# Patient Record
Sex: FEMALE | Race: WHITE | Employment: OTHER | URBAN - METROPOLITAN AREA
[De-identification: names, ages, dates, MRNs, and addresses within clinical notes are randomized per-mention and may not be internally consistent; named-entity substitution may affect disease eponyms.]

---

## 2019-12-15 ENCOUNTER — APPOINTMENT (OUTPATIENT)
Dept: GENERAL RADIOLOGY | Age: 79
End: 2019-12-15
Payer: MEDICARE

## 2019-12-15 ENCOUNTER — HOSPITAL ENCOUNTER (OUTPATIENT)
Age: 79
Setting detail: OBSERVATION
Discharge: HOME | End: 2019-12-16
Attending: EMERGENCY MEDICINE | Admitting: INTERNAL MEDICINE
Payer: MEDICARE

## 2019-12-15 DIAGNOSIS — R07.9 CHEST PAIN, UNSPECIFIED TYPE: Primary | ICD-10-CM

## 2019-12-15 LAB
ANION GAP SERPL CALCULATED.3IONS-SCNC: 11 MMOL/L (ref 9–17)
BNP INTERPRETATION: NORMAL
BUN BLDV-MCNC: 14 MG/DL (ref 8–23)
BUN/CREAT BLD: ABNORMAL (ref 9–20)
CALCIUM SERPL-MCNC: 9.9 MG/DL (ref 8.6–10.4)
CHLORIDE BLD-SCNC: 97 MMOL/L (ref 98–107)
CO2: 27 MMOL/L (ref 20–31)
CREAT SERPL-MCNC: 0.92 MG/DL (ref 0.5–0.9)
D-DIMER QUANTITATIVE: 0.58 MG/L FEU
GFR AFRICAN AMERICAN: >60 ML/MIN
GFR NON-AFRICAN AMERICAN: 59 ML/MIN
GFR SERPL CREATININE-BSD FRML MDRD: ABNORMAL ML/MIN/{1.73_M2}
GFR SERPL CREATININE-BSD FRML MDRD: ABNORMAL ML/MIN/{1.73_M2}
GLUCOSE BLD-MCNC: 98 MG/DL (ref 70–99)
HCT VFR BLD CALC: 43 % (ref 36–46)
HEMOGLOBIN: 14.4 G/DL (ref 12–16)
MCH RBC QN AUTO: 29.6 PG (ref 26–34)
MCHC RBC AUTO-ENTMCNC: 33.4 G/DL (ref 31–37)
MCV RBC AUTO: 88.6 FL (ref 80–100)
NRBC AUTOMATED: NORMAL PER 100 WBC
PDW BLD-RTO: 13.8 % (ref 12.5–15.4)
PLATELET # BLD: 198 K/UL (ref 140–450)
PMV BLD AUTO: 9.9 FL (ref 6–12)
POTASSIUM SERPL-SCNC: 3.8 MMOL/L (ref 3.7–5.3)
PRO-BNP: 299 PG/ML
RBC # BLD: 4.85 M/UL (ref 4–5.2)
SODIUM BLD-SCNC: 135 MMOL/L (ref 135–144)
TROPONIN INTERP: NORMAL
TROPONIN INTERP: NORMAL
TROPONIN T: NORMAL NG/ML
TROPONIN T: NORMAL NG/ML
TROPONIN, HIGH SENSITIVITY: <6 NG/L (ref 0–14)
TROPONIN, HIGH SENSITIVITY: <6 NG/L (ref 0–14)
WBC # BLD: 6.2 K/UL (ref 3.5–11)

## 2019-12-15 PROCEDURE — G0378 HOSPITAL OBSERVATION PER HR: HCPCS

## 2019-12-15 PROCEDURE — 83880 ASSAY OF NATRIURETIC PEPTIDE: CPT

## 2019-12-15 PROCEDURE — 85027 COMPLETE CBC AUTOMATED: CPT

## 2019-12-15 PROCEDURE — 36415 COLL VENOUS BLD VENIPUNCTURE: CPT

## 2019-12-15 PROCEDURE — 6370000000 HC RX 637 (ALT 250 FOR IP): Performed by: EMERGENCY MEDICINE

## 2019-12-15 PROCEDURE — 84484 ASSAY OF TROPONIN QUANT: CPT

## 2019-12-15 PROCEDURE — 6360000002 HC RX W HCPCS: Performed by: NURSE PRACTITIONER

## 2019-12-15 PROCEDURE — 85379 FIBRIN DEGRADATION QUANT: CPT

## 2019-12-15 PROCEDURE — 99285 EMERGENCY DEPT VISIT HI MDM: CPT

## 2019-12-15 PROCEDURE — 71046 X-RAY EXAM CHEST 2 VIEWS: CPT

## 2019-12-15 PROCEDURE — 93005 ELECTROCARDIOGRAM TRACING: CPT | Performed by: EMERGENCY MEDICINE

## 2019-12-15 PROCEDURE — 80048 BASIC METABOLIC PNL TOTAL CA: CPT

## 2019-12-15 PROCEDURE — 2580000003 HC RX 258: Performed by: NURSE PRACTITIONER

## 2019-12-15 RX ORDER — ASPIRIN 81 MG/1
324 TABLET, CHEWABLE ORAL ONCE
Status: COMPLETED | OUTPATIENT
Start: 2019-12-15 | End: 2019-12-15

## 2019-12-15 RX ORDER — LOVASTATIN 20 MG/1
30 TABLET ORAL DAILY
COMMUNITY
End: 2022-08-17

## 2019-12-15 RX ORDER — SODIUM CHLORIDE 0.9 % (FLUSH) 0.9 %
10 SYRINGE (ML) INJECTION PRN
Status: DISCONTINUED | OUTPATIENT
Start: 2019-12-15 | End: 2019-12-16 | Stop reason: HOSPADM

## 2019-12-15 RX ORDER — PANTOPRAZOLE SODIUM 40 MG/1
40 TABLET, DELAYED RELEASE ORAL DAILY
Status: ON HOLD | COMMUNITY
End: 2020-01-09

## 2019-12-15 RX ORDER — ACETAMINOPHEN 160 MG
TABLET,DISINTEGRATING ORAL
Status: ON HOLD | COMMUNITY
End: 2022-09-01 | Stop reason: HOSPADM

## 2019-12-15 RX ORDER — HYDRALAZINE HYDROCHLORIDE 20 MG/ML
10 INJECTION INTRAMUSCULAR; INTRAVENOUS EVERY 6 HOURS PRN
Status: DISCONTINUED | OUTPATIENT
Start: 2019-12-15 | End: 2019-12-16 | Stop reason: HOSPADM

## 2019-12-15 RX ORDER — NITROGLYCERIN 0.4 MG/1
0.4 TABLET SUBLINGUAL ONCE
Status: COMPLETED | OUTPATIENT
Start: 2019-12-15 | End: 2019-12-15

## 2019-12-15 RX ORDER — MAGNESIUM SULFATE 1 G/100ML
1 INJECTION INTRAVENOUS PRN
Status: DISCONTINUED | OUTPATIENT
Start: 2019-12-15 | End: 2019-12-16 | Stop reason: HOSPADM

## 2019-12-15 RX ORDER — ASPIRIN 81 MG/1
81 TABLET, CHEWABLE ORAL DAILY
COMMUNITY
End: 2022-08-17

## 2019-12-15 RX ORDER — ESTRADIOL 0.5 MG/1
0.25 TABLET ORAL DAILY
COMMUNITY
End: 2022-08-17

## 2019-12-15 RX ORDER — POTASSIUM CHLORIDE 7.45 MG/ML
10 INJECTION INTRAVENOUS PRN
Status: DISCONTINUED | OUTPATIENT
Start: 2019-12-15 | End: 2019-12-16 | Stop reason: HOSPADM

## 2019-12-15 RX ORDER — PANTOPRAZOLE SODIUM 40 MG/1
40 TABLET, DELAYED RELEASE ORAL DAILY
Status: DISCONTINUED | OUTPATIENT
Start: 2019-12-16 | End: 2019-12-16

## 2019-12-15 RX ORDER — ACEBUTOLOL HYDROCHLORIDE 400 MG/1
400 CAPSULE ORAL DAILY
COMMUNITY
End: 2022-08-17

## 2019-12-15 RX ORDER — NITROGLYCERIN 0.4 MG/1
0.4 TABLET SUBLINGUAL EVERY 5 MIN PRN
Status: DISCONTINUED | OUTPATIENT
Start: 2019-12-15 | End: 2019-12-16 | Stop reason: HOSPADM

## 2019-12-15 RX ORDER — ONDANSETRON 2 MG/ML
4 INJECTION INTRAMUSCULAR; INTRAVENOUS EVERY 6 HOURS PRN
Status: DISCONTINUED | OUTPATIENT
Start: 2019-12-15 | End: 2019-12-16 | Stop reason: HOSPADM

## 2019-12-15 RX ORDER — METOPROLOL TARTRATE 50 MG/1
100 TABLET, FILM COATED ORAL 2 TIMES DAILY
Status: DISCONTINUED | OUTPATIENT
Start: 2019-12-15 | End: 2019-12-16 | Stop reason: HOSPADM

## 2019-12-15 RX ORDER — ASPIRIN 81 MG/1
81 TABLET, CHEWABLE ORAL DAILY
Status: DISCONTINUED | OUTPATIENT
Start: 2019-12-16 | End: 2019-12-15

## 2019-12-15 RX ORDER — SIMVASTATIN 10 MG
10 TABLET ORAL NIGHTLY
Status: DISCONTINUED | OUTPATIENT
Start: 2019-12-15 | End: 2019-12-16 | Stop reason: HOSPADM

## 2019-12-15 RX ORDER — SODIUM CHLORIDE 0.9 % (FLUSH) 0.9 %
10 SYRINGE (ML) INJECTION EVERY 12 HOURS SCHEDULED
Status: DISCONTINUED | OUTPATIENT
Start: 2019-12-15 | End: 2019-12-16 | Stop reason: HOSPADM

## 2019-12-15 RX ORDER — ESTRADIOL 1 MG/1
0.25 TABLET ORAL DAILY
Status: DISCONTINUED | OUTPATIENT
Start: 2019-12-16 | End: 2019-12-16

## 2019-12-15 RX ORDER — POTASSIUM CHLORIDE 20 MEQ/1
40 TABLET, EXTENDED RELEASE ORAL PRN
Status: DISCONTINUED | OUTPATIENT
Start: 2019-12-15 | End: 2019-12-16 | Stop reason: HOSPADM

## 2019-12-15 RX ADMIN — HYDRALAZINE HYDROCHLORIDE 10 MG: 20 INJECTION INTRAMUSCULAR; INTRAVENOUS at 23:34

## 2019-12-15 RX ADMIN — SODIUM CHLORIDE, PRESERVATIVE FREE 10 ML: 5 INJECTION INTRAVENOUS at 23:37

## 2019-12-15 RX ADMIN — NITROGLYCERIN 0.4 MG: 0.4 TABLET, ORALLY DISINTEGRATING SUBLINGUAL at 21:48

## 2019-12-15 RX ADMIN — ASPIRIN 81 MG 324 MG: 81 TABLET ORAL at 21:17

## 2019-12-15 ASSESSMENT — PAIN SCALES - GENERAL
PAINLEVEL_OUTOF10: 2
PAINLEVEL_OUTOF10: 3
PAINLEVEL_OUTOF10: 2
PAINLEVEL_OUTOF10: 6

## 2019-12-15 ASSESSMENT — PAIN DESCRIPTION - ORIENTATION
ORIENTATION: MID
ORIENTATION: MID

## 2019-12-15 ASSESSMENT — HEART SCORE: ECG: 1

## 2019-12-15 ASSESSMENT — PAIN DESCRIPTION - PAIN TYPE
TYPE: ACUTE PAIN
TYPE: ACUTE PAIN

## 2019-12-15 ASSESSMENT — PAIN DESCRIPTION - DESCRIPTORS: DESCRIPTORS: SORE

## 2019-12-15 ASSESSMENT — PAIN DESCRIPTION - LOCATION
LOCATION: CHEST
LOCATION: CHEST

## 2019-12-16 ENCOUNTER — APPOINTMENT (OUTPATIENT)
Dept: NUCLEAR MEDICINE | Age: 79
End: 2019-12-16
Payer: MEDICARE

## 2019-12-16 VITALS
TEMPERATURE: 98.2 F | HEART RATE: 89 BPM | BODY MASS INDEX: 25.25 KG/M2 | SYSTOLIC BLOOD PRESSURE: 130 MMHG | DIASTOLIC BLOOD PRESSURE: 73 MMHG | OXYGEN SATURATION: 97 % | HEIGHT: 62 IN | WEIGHT: 137.2 LBS | RESPIRATION RATE: 16 BRPM

## 2019-12-16 PROBLEM — R41.3 MEMORY PROBLEM: Status: ACTIVE | Noted: 2019-12-16

## 2019-12-16 PROBLEM — I10 ESSENTIAL HYPERTENSION: Status: ACTIVE | Noted: 2019-12-16

## 2019-12-16 PROBLEM — Z86.79 HISTORY OF VENTRICULAR FIBRILLATION: Status: ACTIVE | Noted: 2019-12-16

## 2019-12-16 PROBLEM — F32.9 MAJOR DEPRESSION, SINGLE EPISODE: Status: ACTIVE | Noted: 2019-12-16

## 2019-12-16 PROBLEM — Z86.79 HISTORY OF VENTRICULAR FIBRILLATION: Status: RESOLVED | Noted: 2019-12-16 | Resolved: 2019-12-16

## 2019-12-16 PROBLEM — K44.9 HIATAL HERNIA: Status: ACTIVE | Noted: 2019-12-16

## 2019-12-16 PROBLEM — G47.00 INSOMNIA: Status: ACTIVE | Noted: 2019-12-16

## 2019-12-16 PROBLEM — M19.91 PRIMARY OSTEOARTHRITIS: Status: ACTIVE | Noted: 2019-12-16

## 2019-12-16 PROBLEM — M81.0 OSTEOPOROSIS: Status: ACTIVE | Noted: 2019-12-16

## 2019-12-16 PROBLEM — E78.5 HYPERLIPIDEMIA: Status: ACTIVE | Noted: 2019-12-16

## 2019-12-16 PROBLEM — L71.9 ROSACEA: Status: ACTIVE | Noted: 2019-12-16

## 2019-12-16 PROBLEM — M54.16 LUMBAR RADICULOPATHY: Status: ACTIVE | Noted: 2019-12-16

## 2019-12-16 PROBLEM — K57.32 DIVERTICULITIS OF COLON: Status: ACTIVE | Noted: 2019-12-16

## 2019-12-16 PROBLEM — I82.90 VENOUS THROMBOEMBOLISM (VTE): Status: ACTIVE | Noted: 2019-12-16

## 2019-12-16 PROBLEM — F41.9 ANXIETY: Status: ACTIVE | Noted: 2019-12-16

## 2019-12-16 LAB
ALBUMIN SERPL-MCNC: 4 G/DL (ref 3.5–5.2)
ALBUMIN/GLOBULIN RATIO: 1.4 (ref 1–2.5)
ALP BLD-CCNC: 70 U/L (ref 35–104)
ALT SERPL-CCNC: 15 U/L (ref 5–33)
ANION GAP SERPL CALCULATED.3IONS-SCNC: 12 MMOL/L (ref 9–17)
AST SERPL-CCNC: 20 U/L
BILIRUB SERPL-MCNC: 0.22 MG/DL (ref 0.3–1.2)
BUN BLDV-MCNC: 13 MG/DL (ref 8–23)
BUN/CREAT BLD: ABNORMAL (ref 9–20)
CALCIUM SERPL-MCNC: 9.4 MG/DL (ref 8.6–10.4)
CHLORIDE BLD-SCNC: 103 MMOL/L (ref 98–107)
CHOLESTEROL/HDL RATIO: 3.5
CHOLESTEROL: 234 MG/DL
CO2: 24 MMOL/L (ref 20–31)
CREAT SERPL-MCNC: 0.71 MG/DL (ref 0.5–0.9)
EKG ATRIAL RATE: 71 BPM
EKG P AXIS: 18 DEGREES
EKG P-R INTERVAL: 172 MS
EKG Q-T INTERVAL: 412 MS
EKG QRS DURATION: 98 MS
EKG QTC CALCULATION (BAZETT): 447 MS
EKG R AXIS: -33 DEGREES
EKG T AXIS: 59 DEGREES
EKG VENTRICULAR RATE: 71 BPM
GFR AFRICAN AMERICAN: >60 ML/MIN
GFR NON-AFRICAN AMERICAN: >60 ML/MIN
GFR SERPL CREATININE-BSD FRML MDRD: ABNORMAL ML/MIN/{1.73_M2}
GFR SERPL CREATININE-BSD FRML MDRD: ABNORMAL ML/MIN/{1.73_M2}
GLUCOSE BLD-MCNC: 99 MG/DL (ref 70–99)
HCT VFR BLD CALC: 42 % (ref 36–46)
HDLC SERPL-MCNC: 66 MG/DL
HEMOGLOBIN: 13.8 G/DL (ref 12–16)
LDL CHOLESTEROL: 141 MG/DL (ref 0–130)
LV EF: 67 %
LVEF MODALITY: NORMAL
MAGNESIUM: 1.9 MG/DL (ref 1.6–2.6)
MCH RBC QN AUTO: 29.2 PG (ref 26–34)
MCHC RBC AUTO-ENTMCNC: 32.8 G/DL (ref 31–37)
MCV RBC AUTO: 88.9 FL (ref 80–100)
NRBC AUTOMATED: NORMAL PER 100 WBC
PDW BLD-RTO: 13.5 % (ref 12.5–15.4)
PLATELET # BLD: 179 K/UL (ref 140–450)
PMV BLD AUTO: 9.9 FL (ref 6–12)
POTASSIUM SERPL-SCNC: 3.9 MMOL/L (ref 3.7–5.3)
RBC # BLD: 4.72 M/UL (ref 4–5.2)
SODIUM BLD-SCNC: 139 MMOL/L (ref 135–144)
TOTAL PROTEIN: 6.8 G/DL (ref 6.4–8.3)
TRIGL SERPL-MCNC: 137 MG/DL
TROPONIN INTERP: NORMAL
TROPONIN T: NORMAL NG/ML
TROPONIN, HIGH SENSITIVITY: 7 NG/L (ref 0–14)
VLDLC SERPL CALC-MCNC: ABNORMAL MG/DL (ref 1–30)
WBC # BLD: 5.4 K/UL (ref 3.5–11)

## 2019-12-16 PROCEDURE — 85027 COMPLETE CBC AUTOMATED: CPT

## 2019-12-16 PROCEDURE — 84484 ASSAY OF TROPONIN QUANT: CPT

## 2019-12-16 PROCEDURE — 3430000000 HC RX DIAGNOSTIC RADIOPHARMACEUTICAL: Performed by: CLINICAL NURSE SPECIALIST

## 2019-12-16 PROCEDURE — 80053 COMPREHEN METABOLIC PANEL: CPT

## 2019-12-16 PROCEDURE — 93017 CV STRESS TEST TRACING ONLY: CPT

## 2019-12-16 PROCEDURE — 6360000002 HC RX W HCPCS: Performed by: NURSE PRACTITIONER

## 2019-12-16 PROCEDURE — 2580000003 HC RX 258: Performed by: NURSE PRACTITIONER

## 2019-12-16 PROCEDURE — 2580000003 HC RX 258: Performed by: CLINICAL NURSE SPECIALIST

## 2019-12-16 PROCEDURE — 6370000000 HC RX 637 (ALT 250 FOR IP): Performed by: NURSE PRACTITIONER

## 2019-12-16 PROCEDURE — 96372 THER/PROPH/DIAG INJ SC/IM: CPT

## 2019-12-16 PROCEDURE — 80061 LIPID PANEL: CPT

## 2019-12-16 PROCEDURE — 36415 COLL VENOUS BLD VENIPUNCTURE: CPT

## 2019-12-16 PROCEDURE — G0378 HOSPITAL OBSERVATION PER HR: HCPCS

## 2019-12-16 PROCEDURE — 78452 HT MUSCLE IMAGE SPECT MULT: CPT

## 2019-12-16 PROCEDURE — A9500 TC99M SESTAMIBI: HCPCS | Performed by: CLINICAL NURSE SPECIALIST

## 2019-12-16 PROCEDURE — APPSS45 APP SPLIT SHARED TIME 31-45 MINUTES: Performed by: CLINICAL NURSE SPECIALIST

## 2019-12-16 PROCEDURE — 83735 ASSAY OF MAGNESIUM: CPT

## 2019-12-16 PROCEDURE — 99219 PR INITIAL OBSERVATION CARE/DAY 50 MINUTES: CPT | Performed by: INTERNAL MEDICINE

## 2019-12-16 PROCEDURE — 6360000002 HC RX W HCPCS: Performed by: CLINICAL NURSE SPECIALIST

## 2019-12-16 RX ORDER — PANTOPRAZOLE SODIUM 20 MG/1
20 TABLET, DELAYED RELEASE ORAL DAILY
Status: DISCONTINUED | OUTPATIENT
Start: 2019-12-16 | End: 2019-12-16

## 2019-12-16 RX ORDER — ESTRADIOL 1 MG/1
0.5 TABLET ORAL DAILY
Status: DISCONTINUED | OUTPATIENT
Start: 2019-12-16 | End: 2019-12-16 | Stop reason: HOSPADM

## 2019-12-16 RX ORDER — SODIUM CHLORIDE 0.9 % (FLUSH) 0.9 %
10 SYRINGE (ML) INJECTION PRN
Status: ACTIVE | OUTPATIENT
Start: 2019-12-16 | End: 2019-12-16

## 2019-12-16 RX ORDER — SODIUM CHLORIDE 0.9 % (FLUSH) 0.9 %
10 SYRINGE (ML) INJECTION PRN
Status: DISCONTINUED | OUTPATIENT
Start: 2019-12-16 | End: 2019-12-16 | Stop reason: HOSPADM

## 2019-12-16 RX ORDER — SODIUM CHLORIDE 9 MG/ML
500 INJECTION, SOLUTION INTRAVENOUS CONTINUOUS PRN
Status: ACTIVE | OUTPATIENT
Start: 2019-12-16 | End: 2019-12-16

## 2019-12-16 RX ORDER — NITROGLYCERIN 0.4 MG/1
0.4 TABLET SUBLINGUAL EVERY 5 MIN PRN
Status: ACTIVE | OUTPATIENT
Start: 2019-12-16 | End: 2019-12-16

## 2019-12-16 RX ORDER — ALBUTEROL SULFATE 90 UG/1
2 AEROSOL, METERED RESPIRATORY (INHALATION) PRN
Status: ACTIVE | OUTPATIENT
Start: 2019-12-16 | End: 2019-12-16

## 2019-12-16 RX ORDER — ATROPINE SULFATE 0.1 MG/ML
0.5 INJECTION INTRAVENOUS EVERY 5 MIN PRN
Status: ACTIVE | OUTPATIENT
Start: 2019-12-16 | End: 2019-12-16

## 2019-12-16 RX ORDER — OMEPRAZOLE 20 MG/1
20 CAPSULE, DELAYED RELEASE ORAL DAILY
Status: DISCONTINUED | OUTPATIENT
Start: 2019-12-16 | End: 2019-12-16 | Stop reason: HOSPADM

## 2019-12-16 RX ORDER — AMINOPHYLLINE DIHYDRATE 25 MG/ML
50 INJECTION, SOLUTION INTRAVENOUS PRN
Status: ACTIVE | OUTPATIENT
Start: 2019-12-16 | End: 2019-12-16

## 2019-12-16 RX ORDER — METOPROLOL TARTRATE 5 MG/5ML
5 INJECTION INTRAVENOUS EVERY 5 MIN PRN
Status: ACTIVE | OUTPATIENT
Start: 2019-12-16 | End: 2019-12-16

## 2019-12-16 RX ADMIN — METOPROLOL TARTRATE 100 MG: 50 TABLET, FILM COATED ORAL at 11:22

## 2019-12-16 RX ADMIN — ESTRADIOL 0.5 MG: 1 TABLET ORAL at 13:14

## 2019-12-16 RX ADMIN — SODIUM CHLORIDE, PRESERVATIVE FREE 10 ML: 5 INJECTION INTRAVENOUS at 11:22

## 2019-12-16 RX ADMIN — TETRAKIS(2-METHOXYISOBUTYLISOCYANIDE)COPPER(I) TETRAFLUOROBORATE 12.3 MILLICURIE: 1 INJECTION, POWDER, LYOPHILIZED, FOR SOLUTION INTRAVENOUS at 10:20

## 2019-12-16 RX ADMIN — Medication 10 ML: at 10:22

## 2019-12-16 RX ADMIN — OMEPRAZOLE 20 MG: 20 CAPSULE, DELAYED RELEASE ORAL at 13:14

## 2019-12-16 RX ADMIN — ENOXAPARIN SODIUM 40 MG: 40 INJECTION SUBCUTANEOUS at 11:24

## 2019-12-16 RX ADMIN — REGADENOSON 0.4 MG: 0.08 INJECTION, SOLUTION INTRAVENOUS at 10:25

## 2019-12-16 RX ADMIN — TETRAKIS(2-METHOXYISOBUTYLISOCYANIDE)COPPER(I) TETRAFLUOROBORATE 38.5 MILLICURIE: 1 INJECTION, POWDER, LYOPHILIZED, FOR SOLUTION INTRAVENOUS at 12:30

## 2019-12-16 RX ADMIN — Medication 10 ML: at 10:25

## 2019-12-16 RX ADMIN — ASPIRIN 325 MG: 325 TABLET, COATED ORAL at 11:24

## 2019-12-16 ASSESSMENT — ENCOUNTER SYMPTOMS
TROUBLE SWALLOWING: 0
COUGH: 0
CHEST TIGHTNESS: 0
SORE THROAT: 0
SHORTNESS OF BREATH: 0

## 2019-12-28 LAB
EKG ATRIAL RATE: 81 BPM
EKG P AXIS: 23 DEGREES
EKG P-R INTERVAL: 200 MS
EKG Q-T INTERVAL: 414 MS
EKG QRS DURATION: 102 MS
EKG QTC CALCULATION (BAZETT): 480 MS
EKG R AXIS: -34 DEGREES
EKG T AXIS: 80 DEGREES
EKG VENTRICULAR RATE: 81 BPM

## 2020-01-08 ENCOUNTER — APPOINTMENT (OUTPATIENT)
Dept: CT IMAGING | Age: 80
End: 2020-01-08
Payer: MEDICARE

## 2020-01-08 ENCOUNTER — HOSPITAL ENCOUNTER (OUTPATIENT)
Age: 80
Setting detail: OBSERVATION
Discharge: HOME | End: 2020-01-09
Attending: EMERGENCY MEDICINE | Admitting: INTERNAL MEDICINE
Payer: MEDICARE

## 2020-01-08 LAB
ABSOLUTE EOS #: 0.2 K/UL (ref 0–0.4)
ABSOLUTE IMMATURE GRANULOCYTE: NORMAL K/UL (ref 0–0.3)
ABSOLUTE LYMPH #: 1.6 K/UL (ref 1–4.8)
ABSOLUTE MONO #: 0.6 K/UL (ref 0.1–1.2)
ANION GAP SERPL CALCULATED.3IONS-SCNC: 15 MMOL/L (ref 9–17)
BASOPHILS # BLD: 1 % (ref 0–2)
BASOPHILS ABSOLUTE: 0.1 K/UL (ref 0–0.2)
BILIRUBIN URINE: NEGATIVE
BUN BLDV-MCNC: 11 MG/DL (ref 8–23)
BUN/CREAT BLD: ABNORMAL (ref 9–20)
CALCIUM SERPL-MCNC: 9.4 MG/DL (ref 8.6–10.4)
CHLORIDE BLD-SCNC: 94 MMOL/L (ref 98–107)
CO2: 24 MMOL/L (ref 20–31)
COLOR: YELLOW
COMMENT UA: NORMAL
CREAT SERPL-MCNC: 0.8 MG/DL (ref 0.5–0.9)
D-DIMER QUANTITATIVE: 0.62 MG/L FEU
DIFFERENTIAL TYPE: NORMAL
EOSINOPHILS RELATIVE PERCENT: 3 % (ref 1–4)
GFR AFRICAN AMERICAN: >60 ML/MIN
GFR NON-AFRICAN AMERICAN: >60 ML/MIN
GFR SERPL CREATININE-BSD FRML MDRD: ABNORMAL ML/MIN/{1.73_M2}
GFR SERPL CREATININE-BSD FRML MDRD: ABNORMAL ML/MIN/{1.73_M2}
GLUCOSE BLD-MCNC: 112 MG/DL (ref 70–99)
GLUCOSE URINE: NEGATIVE
HCT VFR BLD CALC: 42.8 % (ref 36–46)
HEMOGLOBIN: 14.3 G/DL (ref 12–16)
IMMATURE GRANULOCYTES: NORMAL %
KETONES, URINE: NEGATIVE
LEUKOCYTE ESTERASE, URINE: NEGATIVE
LYMPHOCYTES # BLD: 24 % (ref 24–44)
MCH RBC QN AUTO: 29.4 PG (ref 26–34)
MCHC RBC AUTO-ENTMCNC: 33.4 G/DL (ref 31–37)
MCV RBC AUTO: 88.1 FL (ref 80–100)
MONOCYTES # BLD: 9 % (ref 2–11)
NITRITE, URINE: NEGATIVE
NRBC AUTOMATED: NORMAL PER 100 WBC
PDW BLD-RTO: 13.8 % (ref 12.5–15.4)
PH UA: 7 (ref 5–8)
PLATELET # BLD: 202 K/UL (ref 140–450)
PLATELET ESTIMATE: NORMAL
PMV BLD AUTO: 9.4 FL (ref 6–12)
POTASSIUM SERPL-SCNC: 4.3 MMOL/L (ref 3.7–5.3)
PROTEIN UA: NEGATIVE
RBC # BLD: 4.86 M/UL (ref 4–5.2)
RBC # BLD: NORMAL 10*6/UL
SEG NEUTROPHILS: 63 % (ref 36–66)
SEGMENTED NEUTROPHILS ABSOLUTE COUNT: 4.4 K/UL (ref 1.8–7.7)
SODIUM BLD-SCNC: 133 MMOL/L (ref 135–144)
SPECIFIC GRAVITY UA: 1.01 (ref 1–1.03)
TROPONIN INTERP: NORMAL
TROPONIN INTERP: NORMAL
TROPONIN T: NORMAL NG/ML
TROPONIN T: NORMAL NG/ML
TROPONIN, HIGH SENSITIVITY: <6 NG/L (ref 0–14)
TROPONIN, HIGH SENSITIVITY: <6 NG/L (ref 0–14)
TURBIDITY: CLEAR
URINE HGB: NEGATIVE
UROBILINOGEN, URINE: NORMAL
WBC # BLD: 7 K/UL (ref 3.5–11)
WBC # BLD: NORMAL 10*3/UL

## 2020-01-08 PROCEDURE — G0378 HOSPITAL OBSERVATION PER HR: HCPCS

## 2020-01-08 PROCEDURE — 99285 EMERGENCY DEPT VISIT HI MDM: CPT

## 2020-01-08 PROCEDURE — 70450 CT HEAD/BRAIN W/O DYE: CPT

## 2020-01-08 PROCEDURE — 6360000004 HC RX CONTRAST MEDICATION: Performed by: EMERGENCY MEDICINE

## 2020-01-08 PROCEDURE — 93005 ELECTROCARDIOGRAM TRACING: CPT | Performed by: PHYSICIAN ASSISTANT

## 2020-01-08 PROCEDURE — 6370000000 HC RX 637 (ALT 250 FOR IP): Performed by: EMERGENCY MEDICINE

## 2020-01-08 PROCEDURE — 2580000003 HC RX 258: Performed by: PHYSICIAN ASSISTANT

## 2020-01-08 PROCEDURE — 85379 FIBRIN DEGRADATION QUANT: CPT

## 2020-01-08 PROCEDURE — 81003 URINALYSIS AUTO W/O SCOPE: CPT

## 2020-01-08 PROCEDURE — 84484 ASSAY OF TROPONIN QUANT: CPT

## 2020-01-08 PROCEDURE — 2580000003 HC RX 258: Performed by: EMERGENCY MEDICINE

## 2020-01-08 PROCEDURE — 85025 COMPLETE CBC W/AUTO DIFF WBC: CPT

## 2020-01-08 PROCEDURE — 80048 BASIC METABOLIC PNL TOTAL CA: CPT

## 2020-01-08 PROCEDURE — 6360000002 HC RX W HCPCS: Performed by: PHYSICIAN ASSISTANT

## 2020-01-08 PROCEDURE — 36415 COLL VENOUS BLD VENIPUNCTURE: CPT

## 2020-01-08 PROCEDURE — 96374 THER/PROPH/DIAG INJ IV PUSH: CPT

## 2020-01-08 PROCEDURE — 71260 CT THORAX DX C+: CPT

## 2020-01-08 RX ORDER — LORAZEPAM 2 MG/ML
0.5 INJECTION INTRAMUSCULAR ONCE
Status: COMPLETED | OUTPATIENT
Start: 2020-01-08 | End: 2020-01-08

## 2020-01-08 RX ORDER — SODIUM CHLORIDE 0.9 % (FLUSH) 0.9 %
10 SYRINGE (ML) INJECTION ONCE
Status: COMPLETED | OUTPATIENT
Start: 2020-01-08 | End: 2020-01-08

## 2020-01-08 RX ORDER — 0.9 % SODIUM CHLORIDE 0.9 %
500 INTRAVENOUS SOLUTION INTRAVENOUS ONCE
Status: COMPLETED | OUTPATIENT
Start: 2020-01-08 | End: 2020-01-08

## 2020-01-08 RX ORDER — MAGNESIUM HYDROXIDE/ALUMINUM HYDROXICE/SIMETHICONE 120; 1200; 1200 MG/30ML; MG/30ML; MG/30ML
30 SUSPENSION ORAL ONCE
Status: COMPLETED | OUTPATIENT
Start: 2020-01-08 | End: 2020-01-08

## 2020-01-08 RX ORDER — PANTOPRAZOLE SODIUM 40 MG/10ML
40 INJECTION, POWDER, LYOPHILIZED, FOR SOLUTION INTRAVENOUS ONCE
Status: COMPLETED | OUTPATIENT
Start: 2020-01-09 | End: 2020-01-09

## 2020-01-08 RX ORDER — 0.9 % SODIUM CHLORIDE 0.9 %
80 INTRAVENOUS SOLUTION INTRAVENOUS ONCE
Status: COMPLETED | OUTPATIENT
Start: 2020-01-08 | End: 2020-01-08

## 2020-01-08 RX ORDER — SODIUM CHLORIDE 9 MG/ML
INJECTION INTRAVENOUS
Status: COMPLETED
Start: 2020-01-08 | End: 2020-01-09

## 2020-01-08 RX ADMIN — LORAZEPAM 0.5 MG: 2 INJECTION INTRAMUSCULAR; INTRAVENOUS at 18:58

## 2020-01-08 RX ADMIN — IOVERSOL 75 ML: 741 INJECTION INTRA-ARTERIAL; INTRAVENOUS at 19:24

## 2020-01-08 RX ADMIN — Medication 10 ML: at 19:24

## 2020-01-08 RX ADMIN — SODIUM CHLORIDE 80 ML: 9 INJECTION, SOLUTION INTRAVENOUS at 19:25

## 2020-01-08 RX ADMIN — ALUMINUM HYDROXIDE, MAGNESIUM HYDROXIDE, AND SIMETHICONE 30 ML: 200; 200; 20 SUSPENSION ORAL at 22:35

## 2020-01-08 RX ADMIN — SODIUM CHLORIDE 1000 ML: 9 INJECTION, SOLUTION INTRAVENOUS at 18:48

## 2020-01-08 ASSESSMENT — PAIN DESCRIPTION - FREQUENCY: FREQUENCY: CONTINUOUS

## 2020-01-08 ASSESSMENT — PAIN DESCRIPTION - PAIN TYPE: TYPE: ACUTE PAIN

## 2020-01-08 ASSESSMENT — ENCOUNTER SYMPTOMS
EYE REDNESS: 0
TROUBLE SWALLOWING: 0
COUGH: 0
EYE DISCHARGE: 0
SORE THROAT: 0
ABDOMINAL PAIN: 0
VOMITING: 0
NAUSEA: 0
SHORTNESS OF BREATH: 0
BACK PAIN: 0

## 2020-01-08 ASSESSMENT — PAIN SCALES - GENERAL: PAINLEVEL_OUTOF10: 4

## 2020-01-08 ASSESSMENT — PAIN DESCRIPTION - LOCATION: LOCATION: CHEST

## 2020-01-08 ASSESSMENT — PAIN DESCRIPTION - DESCRIPTORS: DESCRIPTORS: SORE

## 2020-01-08 ASSESSMENT — PAIN DESCRIPTION - ORIENTATION: ORIENTATION: MID

## 2020-01-08 NOTE — ED PROVIDER NOTES
[address] [facility] Surg ICU  [address] [facility] [address]  Phone: [phone]      Pt Name: Melony Aguilar  MRN: [id number]  [place] 1940  Date of evaluation: 1/8/2020      CHIEF COMPLAINT       Chief Complaint   Patient presents with    Chest Pain     Sudden onset when climbing the stairs at 1800 today. HISTORY OF PRESENT ILLNESS   (Location, Quality, Severity, Duration, Timing, Context, ModifyingFactors, Associated Signs and Symptoms)     Melony Aguilar is a [de-identified] y.o. female who presents to the ER for evaluation of chest pain. Patient states that she developed pain in the chest after climbing the stairs from the basement around 6 PM this evening. Patient states that the pain radiates up into the right side of her neck. She is unable to describe the pain, but states it has been constant. Patient denies difficulty breathing. She has had no cough. She denies pain and swelling in the lower extremities. No recent surgeries or long travel. Patient states that when she noticed the pain, she took an additional half dose of her blood pressure medication. Patient has had similar chest pain in the past.  She was admitted to this facility in December for chest pain. She underwent a cardiac stress test which was found to be negative. She rates her acute chest pain at 4/10. Nursing Notes were reviewed. REVIEW OF SYSTEMS     (2-9 systems for level 4, 10 or more for level 5)    Review of Systems   Constitutional: Negative for chills and fever. HENT: Positive for congestion. Negative for sore throat and trouble swallowing. Eyes: Negative for discharge and redness. Respiratory: Negative for cough and shortness of breath. Cardiovascular: Positive for chest pain. Gastrointestinal: Negative for abdominal pain, nausea and vomiting. Genitourinary: Negative for decreased urine volume. Musculoskeletal: Negative for back pain, myalgias and neck pain.    Skin: Negative weight. She is not toxic-appearing. Comments: Anxious. HENT:      Head: Normocephalic and atraumatic. Mouth/Throat:      Mouth: Mucous membranes are moist.      Pharynx: Oropharynx is clear. Eyes:      General: No scleral icterus. Neck:      Musculoskeletal: Normal range of motion and neck supple. Cardiovascular:      Rate and Rhythm: Normal rate and regular rhythm. Heart sounds: Normal heart sounds. Pulmonary:      Effort: Pulmonary effort is normal. No respiratory distress. Breath sounds: Normal breath sounds. No wheezing. Chest:      Chest wall: No tenderness. Abdominal:      General: Bowel sounds are normal.      Palpations: Abdomen is soft. Tenderness: There is no tenderness. There is no guarding or rebound. Musculoskeletal:      Comments: No pain or swelling in the bilateral lower extremities. Skin:     General: Skin is warm and dry. Findings: No rash. Neurological:      General: No focal deficit present. Mental Status: She is alert. Psychiatric:         Mood and Affect: Mood normal.         Behavior: Behavior normal.       DIAGNOSTIC RESULTS   EKG  EKG obtained at 18:21 shows normal sinus rhythm at a rate of 62 bpm.  No ST elevations or depressions. No T wave inversions. Left axis deviation as interpreted by me. EKG was also interpreted by the attending physician, Dr. Samm Driver. RADIOLOGY:   Radiology images were visualized by myself. The Radiologist interpretations were reviewed and are as follows:     CT Chest Pulmonary Embolism W Contrast (Final result)   Result time 01/08/20 20:38:49   Final result by Joon Meyer MD (01/08/20 20:38:49)                Impression:    No evidence of pulmonary embolism or acute pulmonary abnormality.             Narrative:    EXAMINATION:  CTA OF THE CHEST 1/8/2020 7:19 pm    TECHNIQUE:  CTA of the chest was performed after the administration of intravenous  contrast.  Multiplanar reformatted images are provided for review.  MIP  images are provided for review. Dose modulation, iterative reconstruction,  and/or weight based adjustment of the mA/kV was utilized to reduce the  radiation dose to as low as reasonably achievable. COMPARISON:  Chest x-ray 12/15/2019    HISTORY:  ORDERING SYSTEM PROVIDED HISTORY: Shortness of breath/chest pain  TECHNOLOGIST PROVIDED HISTORY:  Shortness of breath/chest pain  Reason for Exam: chest pain  Acuity: Acute  Type of Exam: Initial  Relevant Medical/Surgical History: HTN    FINDINGS:  Pulmonary Arteries: Pulmonary arteries are adequately opacified for  evaluation.  No evidence of intraluminal filling defect to suggest pulmonary  embolism.  Main pulmonary artery is normal in caliber. Mediastinum: No evidence of mediastinal lymphadenopathy.  The heart is  borderline enlarged.  Calcific coronary artery disease.  The heart and  pericardium demonstrate no acute abnormality.  There is no acute abnormality  of the thoracic aorta. Lungs/pleura: The lungs are without acute process.  No focal consolidation or  pulmonary edema.  No evidence of pleural effusion or pneumothorax. Upper Abdomen: Limited images of the upper abdomen are unremarkable. Soft Tissues/Bones: No acute bone or soft tissue abnormality.  Mild concavity  of the superior endplate of K18 incompletely visualized.                    CT HEAD WO CONTRAST (Final result)   Result time 01/08/20 19:59:42   Final result by Shani Cagle MD (01/08/20 19:59:42)                Impression:    No acute intracranial abnormality. Narrative:    EXAMINATION:  CT OF THE HEAD WITHOUT CONTRAST  1/8/2020 7:19 pm    TECHNIQUE:  CT of the head was performed without the administration of intravenous  contrast. Dose modulation, iterative reconstruction, and/or weight based  adjustment of the mA/kV was utilized to reduce the radiation dose to as low  as reasonably achievable. COMPARISON:  None.     HISTORY:  ORDERING SYSTEM PROVIDED HISTORY: dizziness  TECHNOLOGIST PROVIDED HISTORY:  dizziness    Reason for Exam: dizziness  Acuity: Acute  Type of Exam: Initial  Relevant Medical/Surgical History: HTN    FINDINGS:  BRAIN/VENTRICLES: There is no acute intracranial hemorrhage, mass effect or  midline shift.  No abnormal extra-axial fluid collection.  The gray-white  differentiation is maintained without evidence of an acute infarct.  There is  no evidence of hydrocephalus. ORBITS: The visualized portion of the orbits demonstrate no acute abnormality. SINUSES: The visualized paranasal sinuses and mastoid air cells demonstrate  no acute abnormality. SOFT TISSUES/SKULL:  No acute abnormality of the visualized skull or soft  tissues. LABS:  Results for orders placed or performed during the hospital encounter of 01/08/20   RAPID INFLUENZA A/B ANTIGENS   Result Value Ref Range    Specimen Description . NASOPHARYNGEAL SWAB     Special Requests NOT REPORTED     Direct Exam       PRESUMPTIVE NEGATIVE for Influenza A + B antigens. PCR testing to confirm this result is available upon request.  Specimen will be saved in the laboratory for 7 days. Please call [phone] if PCR testing is indicated. Urinalysis Reflex to Culture   Result Value Ref Range    Color, UA YELLOW YELLOW    Turbidity UA CLEAR CLEAR    Glucose, Ur NEGATIVE NEGATIVE    Bilirubin Urine NEGATIVE NEGATIVE    Ketones, Urine NEGATIVE NEGATIVE    Specific Gravity, UA 1.007 1.005 - 1.030    Urine Hgb NEGATIVE NEGATIVE    pH, UA 7.0 5.0 - 8.0    Protein, UA NEGATIVE NEGATIVE    Urobilinogen, Urine Normal Normal    Nitrite, Urine NEGATIVE NEGATIVE    Leukocyte Esterase, Urine NEGATIVE NEGATIVE    Urinalysis Comments       Microscopic exam not performed based on chemical results unless requested in original order.     Urinalysis Comments          Urinalysis Comments       Utilizing a urinalysis as the only screening method to exclude a potential uropathogen can be unreliable in many patient populations. Rapid screening tests are less sensitive than culture and if UTI is a clinical possibility, culture should be considered despite a negative urinalysis.    Troponin   Result Value Ref Range    Troponin, High Sensitivity <6 0 - 14 ng/L    Troponin T NOT REPORTED <0.03 ng/mL    Troponin Interp NOT REPORTED    Troponin   Result Value Ref Range    Troponin, High Sensitivity <6 0 - 14 ng/L    Troponin T NOT REPORTED <0.03 ng/mL    Troponin Interp NOT REPORTED    CBC Auto Differential   Result Value Ref Range    WBC 7.0 3.5 - 11.0 k/uL    RBC 4.86 4.0 - 5.2 m/uL    Hemoglobin 14.3 12.0 - 16.0 g/dL    Hematocrit 42.8 36 - 46 %    MCV 88.1 80 - 100 fL    MCH 29.4 26 - 34 pg    MCHC 33.4 31 - 37 g/dL    RDW 13.8 12.5 - 15.4 %    Platelets 845 654 - 106 k/uL    MPV 9.4 6.0 - 12.0 fL    NRBC Automated NOT REPORTED per 100 WBC    Differential Type NOT REPORTED     Seg Neutrophils 63 36 - 66 %    Lymphocytes 24 24 - 44 %    Monocytes 9 2 - 11 %    Eosinophils % 3 1 - 4 %    Basophils 1 0 - 2 %    Immature Granulocytes NOT REPORTED 0 %    Segs Absolute 4.40 1.8 - 7.7 k/uL    Absolute Lymph # 1.60 1.0 - 4.8 k/uL    Absolute Mono # 0.60 0.1 - 1.2 k/uL    Absolute Eos # 0.20 0.0 - 0.4 k/uL    Basophils Absolute 0.10 0.0 - 0.2 k/uL    Absolute Immature Granulocyte NOT REPORTED 0.00 - 0.30 k/uL    WBC Morphology NOT REPORTED     RBC Morphology NOT REPORTED     Platelet Estimate NOT REPORTED    Basic Metabolic Panel   Result Value Ref Range    Glucose 112 (H) 70 - 99 mg/dL    BUN 11 8 - 23 mg/dL    CREATININE 0.80 0.50 - 0.90 mg/dL    Bun/Cre Ratio NOT REPORTED 9 - 20    Calcium 9.4 8.6 - 10.4 mg/dL    Sodium 133 (L) 135 - 144 mmol/L    Potassium 4.3 3.7 - 5.3 mmol/L    Chloride 94 (L) 98 - 107 mmol/L    CO2 24 20 - 31 mmol/L    Anion Gap 15 9 - 17 mmol/L    GFR Non-African American >60 >60 mL/min    GFR African American >60 >60 mL/min    GFR Comment          GFR Staging NOT REPORTED    D-Dimer, Quantitative   Result Value Ref Range    D-Dimer, Quant 0.62 mg/L FEU   Troponin   Result Value Ref Range    Troponin, High Sensitivity 8 0 - 14 ng/L    Troponin T NOT REPORTED <0.03 ng/mL    Troponin Interp NOT REPORTED    Troponin   Result Value Ref Range    Troponin, High Sensitivity 8 0 - 14 ng/L    Troponin T NOT REPORTED <0.03 ng/mL    Troponin Interp NOT REPORTED    CBC   Result Value Ref Range    WBC 5.6 3.5 - 11.0 k/uL    RBC 4.58 4.0 - 5.2 m/uL    Hemoglobin 13.5 12.0 - 16.0 g/dL    Hematocrit 40.9 36 - 46 %    MCV 89.3 80 - 100 fL    MCH 29.4 26 - 34 pg    MCHC 33.0 31 - 37 g/dL    RDW 13.9 12.5 - 15.4 %    Platelets 012 986 - 992 k/uL    MPV 9.7 6.0 - 12.0 fL    NRBC Automated NOT REPORTED per 100 WBC   Comprehensive Metabolic Panel w/ Reflex to MG   Result Value Ref Range    Glucose 107 (H) 70 - 99 mg/dL    BUN 8 8 - 23 mg/dL    CREATININE 0.70 0.50 - 0.90 mg/dL    Bun/Cre Ratio NOT REPORTED 9 - 20    Calcium 8.9 8.6 - 10.4 mg/dL    Sodium 138 135 - 144 mmol/L    Potassium 4.0 3.7 - 5.3 mmol/L    Chloride 101 98 - 107 mmol/L    CO2 25 20 - 31 mmol/L    Anion Gap 12 9 - 17 mmol/L    Alkaline Phosphatase 68 35 - 104 U/L    ALT 16 5 - 33 U/L    AST 19 <32 U/L    Total Bilirubin 0.21 (L) 0.3 - 1.2 mg/dL    Total Protein 6.5 6.4 - 8.3 g/dL    Alb 3.7 3.5 - 5.2 g/dL    Albumin/Globulin Ratio 1.3 1.0 - 2.5    GFR Non-African American >60 >60 mL/min    GFR African American >60 >60 mL/min    GFR Comment          GFR Staging NOT REPORTED    Lipid Panel   Result Value Ref Range    Cholesterol 205 (H) <200 mg/dL    HDL 59 >40 mg/dL    LDL Cholesterol 115 0 - 130 mg/dL    Chol/HDL Ratio 3.5 <5    Triglycerides 153 (H) <150 mg/dL    VLDL NOT REPORTED (H) 1 - 30 mg/dL   Magnesium   Result Value Ref Range    Magnesium 1.9 1.6 - 2.6 mg/dL   EKG 12 Lead   Result Value Ref Range    Ventricular Rate 62 BPM    Atrial Rate 62 BPM    P-R Interval 170 ms    QRS Duration 96 ms    Q-T Interval 454 ms    QTc Calculation (Bazett) 460 ms    P Axis 119 degrees    R Axis -35 degrees    T Axis 87 degrees   EKG 12 Lead   Result Value Ref Range    Ventricular Rate 82 BPM    Atrial Rate 82 BPM    P-R Interval 194 ms    QRS Duration 102 ms    Q-T Interval 426 ms    QTc Calculation (Bazett) 497 ms    P Axis 36 degrees    R Axis -36 degrees    T Axis 70 degrees     MDM:   The HEART Score for Chest Pain Patients in the ED    History   Highly Suspicious    2 points   Moderately Suspicious   1 point   Slightly or Non-Suspicious    0 points    ECG   Signficant ST-Depression   2 points   Nonspecific Repolarization   1 point   Normal      0 points    Age   ? 65 years     2 points   > 45 - < 65 years    1 point   ? 45 years     0 points    Risk Factors (DM, current or recent (<one month) smoker, HTN, HLP, family history of CAD, & obesity)   ? 3 Risk Factors or History of CAD  2 points   1 or 2 Risk Factors    1 point   No Risk Factors    0 points    Troponin   ? 3 x Normal Limit    2 points   > 1 - < 3 x Normal Limit   1 point   ? Normal Limit     0 points    Score 0 - 3: Low risk  Score 4 - 6: Intermediate risk  Score 7 - 10:  High risk    Patient Score: 3    PERC Score  Age > or = 50  YES  HR > or = 100  NO  Oxygen saturation on room air < 95%  NO  Prior history of venous thromboembolism  NO  Trauma or surgery within 4 weeks  NO  Hemoptysis  NO  Exogenous Estrogen  NO  Unilateral leg swelling  NO    EMERGENCY DEPARTMENT COURSE:   Vitals:    Vitals:    01/09/20 0033 01/09/20 0128 01/09/20 0448 01/09/20 0814   BP: (!) 168/78 (!) 101/56 104/67 (!) 106/59   Pulse: 60 88 76 71   Resp: 18  18 18   Temp: 97.5 °F (36.4 °C)  98.1 °F (36.7 °C) 98.4 °F (36.9 °C)   TempSrc: Oral  Oral Oral   SpO2: 98%  96% 97%   Weight:       Height: 5' 1.5\" (1.562 m)        -------------------------  BP: (!) 106/59, Temp: 98.4 °F (36.9 °C), Pulse: 71, Resp: 18    The patient was given the following medications:  Orders Placed This

## 2020-01-09 VITALS
DIASTOLIC BLOOD PRESSURE: 69 MMHG | WEIGHT: 130 LBS | RESPIRATION RATE: 18 BRPM | BODY MASS INDEX: 23.92 KG/M2 | OXYGEN SATURATION: 97 % | HEIGHT: 62 IN | HEART RATE: 78 BPM | TEMPERATURE: 98.4 F | SYSTOLIC BLOOD PRESSURE: 121 MMHG

## 2020-01-09 PROBLEM — J32.1 CHRONIC FRONTAL SINUSITIS: Status: ACTIVE | Noted: 2020-01-09

## 2020-01-09 PROBLEM — E87.1 HYPONATREMIA: Status: ACTIVE | Noted: 2020-01-09

## 2020-01-09 PROBLEM — I10 ACCELERATED HYPERTENSION: Status: ACTIVE | Noted: 2020-01-09

## 2020-01-09 LAB
ALBUMIN SERPL-MCNC: 3.7 G/DL (ref 3.5–5.2)
ALBUMIN/GLOBULIN RATIO: 1.3 (ref 1–2.5)
ALP BLD-CCNC: 68 U/L (ref 35–104)
ALT SERPL-CCNC: 16 U/L (ref 5–33)
ANION GAP SERPL CALCULATED.3IONS-SCNC: 12 MMOL/L (ref 9–17)
AST SERPL-CCNC: 19 U/L
BILIRUB SERPL-MCNC: 0.21 MG/DL (ref 0.3–1.2)
BUN BLDV-MCNC: 8 MG/DL (ref 8–23)
BUN/CREAT BLD: ABNORMAL (ref 9–20)
CALCIUM SERPL-MCNC: 8.9 MG/DL (ref 8.6–10.4)
CHLORIDE BLD-SCNC: 101 MMOL/L (ref 98–107)
CHOLESTEROL/HDL RATIO: 3.5
CHOLESTEROL: 205 MG/DL
CO2: 25 MMOL/L (ref 20–31)
CREAT SERPL-MCNC: 0.7 MG/DL (ref 0.5–0.9)
DIRECT EXAM: NORMAL
GFR AFRICAN AMERICAN: >60 ML/MIN
GFR NON-AFRICAN AMERICAN: >60 ML/MIN
GFR SERPL CREATININE-BSD FRML MDRD: ABNORMAL ML/MIN/{1.73_M2}
GFR SERPL CREATININE-BSD FRML MDRD: ABNORMAL ML/MIN/{1.73_M2}
GLUCOSE BLD-MCNC: 107 MG/DL (ref 70–99)
HCT VFR BLD CALC: 40.9 % (ref 36–46)
HDLC SERPL-MCNC: 59 MG/DL
HEMOGLOBIN: 13.5 G/DL (ref 12–16)
LDL CHOLESTEROL: 115 MG/DL (ref 0–130)
Lab: NORMAL
MAGNESIUM: 1.9 MG/DL (ref 1.6–2.6)
MCH RBC QN AUTO: 29.4 PG (ref 26–34)
MCHC RBC AUTO-ENTMCNC: 33 G/DL (ref 31–37)
MCV RBC AUTO: 89.3 FL (ref 80–100)
NRBC AUTOMATED: NORMAL PER 100 WBC
PDW BLD-RTO: 13.9 % (ref 12.5–15.4)
PLATELET # BLD: 189 K/UL (ref 140–450)
PMV BLD AUTO: 9.7 FL (ref 6–12)
POTASSIUM SERPL-SCNC: 4 MMOL/L (ref 3.7–5.3)
RBC # BLD: 4.58 M/UL (ref 4–5.2)
SODIUM BLD-SCNC: 138 MMOL/L (ref 135–144)
SPECIMEN DESCRIPTION: NORMAL
TOTAL PROTEIN: 6.5 G/DL (ref 6.4–8.3)
TRIGL SERPL-MCNC: 153 MG/DL
TROPONIN INTERP: NORMAL
TROPONIN INTERP: NORMAL
TROPONIN T: NORMAL NG/ML
TROPONIN T: NORMAL NG/ML
TROPONIN, HIGH SENSITIVITY: 8 NG/L (ref 0–14)
TROPONIN, HIGH SENSITIVITY: 8 NG/L (ref 0–14)
VLDLC SERPL CALC-MCNC: ABNORMAL MG/DL (ref 1–30)
WBC # BLD: 5.6 K/UL (ref 3.5–11)

## 2020-01-09 PROCEDURE — G0378 HOSPITAL OBSERVATION PER HR: HCPCS

## 2020-01-09 PROCEDURE — 99219 PR INITIAL OBSERVATION CARE/DAY 50 MINUTES: CPT | Performed by: INTERNAL MEDICINE

## 2020-01-09 PROCEDURE — 84484 ASSAY OF TROPONIN QUANT: CPT

## 2020-01-09 PROCEDURE — 2580000003 HC RX 258

## 2020-01-09 PROCEDURE — 85027 COMPLETE CBC AUTOMATED: CPT

## 2020-01-09 PROCEDURE — C9113 INJ PANTOPRAZOLE SODIUM, VIA: HCPCS | Performed by: EMERGENCY MEDICINE

## 2020-01-09 PROCEDURE — 80061 LIPID PANEL: CPT

## 2020-01-09 PROCEDURE — 6360000002 HC RX W HCPCS: Performed by: NURSE PRACTITIONER

## 2020-01-09 PROCEDURE — 87804 INFLUENZA ASSAY W/OPTIC: CPT

## 2020-01-09 PROCEDURE — 6360000002 HC RX W HCPCS: Performed by: EMERGENCY MEDICINE

## 2020-01-09 PROCEDURE — 6370000000 HC RX 637 (ALT 250 FOR IP): Performed by: NURSE PRACTITIONER

## 2020-01-09 PROCEDURE — 96375 TX/PRO/DX INJ NEW DRUG ADDON: CPT

## 2020-01-09 PROCEDURE — 80053 COMPREHEN METABOLIC PANEL: CPT

## 2020-01-09 PROCEDURE — 36415 COLL VENOUS BLD VENIPUNCTURE: CPT

## 2020-01-09 PROCEDURE — 83735 ASSAY OF MAGNESIUM: CPT

## 2020-01-09 PROCEDURE — 93005 ELECTROCARDIOGRAM TRACING: CPT | Performed by: PHYSICIAN ASSISTANT

## 2020-01-09 RX ORDER — SODIUM CHLORIDE 0.9 % (FLUSH) 0.9 %
10 SYRINGE (ML) INJECTION PRN
Status: DISCONTINUED | OUTPATIENT
Start: 2020-01-09 | End: 2020-01-09 | Stop reason: HOSPADM

## 2020-01-09 RX ORDER — SIMVASTATIN 10 MG
10 TABLET ORAL NIGHTLY
Status: DISCONTINUED | OUTPATIENT
Start: 2020-01-09 | End: 2020-01-09 | Stop reason: HOSPADM

## 2020-01-09 RX ORDER — NITROGLYCERIN 0.4 MG/1
0.4 TABLET SUBLINGUAL EVERY 5 MIN PRN
Status: DISCONTINUED | OUTPATIENT
Start: 2020-01-09 | End: 2020-01-09 | Stop reason: HOSPADM

## 2020-01-09 RX ORDER — ONDANSETRON 2 MG/ML
4 INJECTION INTRAMUSCULAR; INTRAVENOUS EVERY 6 HOURS PRN
Status: DISCONTINUED | OUTPATIENT
Start: 2020-01-09 | End: 2020-01-09 | Stop reason: HOSPADM

## 2020-01-09 RX ORDER — ESTRADIOL 1 MG/1
0.25 TABLET ORAL DAILY
Status: DISCONTINUED | OUTPATIENT
Start: 2020-01-09 | End: 2020-01-09 | Stop reason: HOSPADM

## 2020-01-09 RX ORDER — METOPROLOL TARTRATE 50 MG/1
100 TABLET, FILM COATED ORAL 2 TIMES DAILY
Status: DISCONTINUED | OUTPATIENT
Start: 2020-01-09 | End: 2020-01-09 | Stop reason: HOSPADM

## 2020-01-09 RX ORDER — DIPHENHYDRAMINE HYDROCHLORIDE 50 MG/ML
12.5 INJECTION INTRAMUSCULAR; INTRAVENOUS EVERY 6 HOURS PRN
Status: DISCONTINUED | OUTPATIENT
Start: 2020-01-09 | End: 2020-01-09 | Stop reason: HOSPADM

## 2020-01-09 RX ORDER — PANTOPRAZOLE SODIUM 40 MG/1
40 TABLET, DELAYED RELEASE ORAL DAILY
Status: DISCONTINUED | OUTPATIENT
Start: 2020-01-09 | End: 2020-01-09 | Stop reason: HOSPADM

## 2020-01-09 RX ORDER — HYDRALAZINE HYDROCHLORIDE 20 MG/ML
10 INJECTION INTRAMUSCULAR; INTRAVENOUS EVERY 6 HOURS PRN
Status: DISCONTINUED | OUTPATIENT
Start: 2020-01-09 | End: 2020-01-09 | Stop reason: HOSPADM

## 2020-01-09 RX ORDER — PANTOPRAZOLE SODIUM 40 MG/1
40 TABLET, DELAYED RELEASE ORAL DAILY
Qty: 30 TABLET | Refills: 3 | Status: ON HOLD | OUTPATIENT
Start: 2020-01-09 | End: 2022-09-01 | Stop reason: HOSPADM

## 2020-01-09 RX ORDER — POTASSIUM CHLORIDE 7.45 MG/ML
10 INJECTION INTRAVENOUS PRN
Status: DISCONTINUED | OUTPATIENT
Start: 2020-01-09 | End: 2020-01-09 | Stop reason: HOSPADM

## 2020-01-09 RX ORDER — ACETAMINOPHEN 325 MG/1
650 TABLET ORAL EVERY 4 HOURS PRN
Status: DISCONTINUED | OUTPATIENT
Start: 2020-01-09 | End: 2020-01-09 | Stop reason: HOSPADM

## 2020-01-09 RX ORDER — MAGNESIUM SULFATE 1 G/100ML
1 INJECTION INTRAVENOUS PRN
Status: DISCONTINUED | OUTPATIENT
Start: 2020-01-09 | End: 2020-01-09 | Stop reason: HOSPADM

## 2020-01-09 RX ORDER — ASPIRIN 81 MG/1
81 TABLET, CHEWABLE ORAL DAILY
Status: DISCONTINUED | OUTPATIENT
Start: 2020-01-09 | End: 2020-01-09

## 2020-01-09 RX ORDER — POTASSIUM CHLORIDE 20 MEQ/1
40 TABLET, EXTENDED RELEASE ORAL PRN
Status: DISCONTINUED | OUTPATIENT
Start: 2020-01-09 | End: 2020-01-09 | Stop reason: HOSPADM

## 2020-01-09 RX ORDER — SODIUM CHLORIDE 0.9 % (FLUSH) 0.9 %
10 SYRINGE (ML) INJECTION EVERY 12 HOURS SCHEDULED
Status: DISCONTINUED | OUTPATIENT
Start: 2020-01-09 | End: 2020-01-09 | Stop reason: HOSPADM

## 2020-01-09 RX ADMIN — SODIUM CHLORIDE 10 ML: 9 INJECTION, SOLUTION INTRAMUSCULAR; INTRAVENOUS; SUBCUTANEOUS at 00:00

## 2020-01-09 RX ADMIN — HYDRALAZINE HYDROCHLORIDE 10 MG: 20 INJECTION INTRAMUSCULAR; INTRAVENOUS at 00:54

## 2020-01-09 RX ADMIN — PANTOPRAZOLE SODIUM 40 MG: 40 INJECTION, POWDER, FOR SOLUTION INTRAVENOUS at 00:00

## 2020-01-09 RX ADMIN — NITROGLYCERIN 0.4 MG: 0.4 TABLET, ORALLY DISINTEGRATING SUBLINGUAL at 01:25

## 2020-01-09 RX ADMIN — ACETAMINOPHEN 650 MG: 325 TABLET ORAL at 11:18

## 2020-01-09 ASSESSMENT — PAIN DESCRIPTION - ORIENTATION
ORIENTATION: RIGHT;LEFT
ORIENTATION: MID

## 2020-01-09 ASSESSMENT — PAIN SCALES - GENERAL
PAINLEVEL_OUTOF10: 0
PAINLEVEL_OUTOF10: 1
PAINLEVEL_OUTOF10: 0
PAINLEVEL_OUTOF10: 4

## 2020-01-09 ASSESSMENT — PAIN DESCRIPTION - PAIN TYPE
TYPE: ACUTE PAIN
TYPE: ACUTE PAIN

## 2020-01-09 ASSESSMENT — PAIN DESCRIPTION - DESCRIPTORS: DESCRIPTORS: ACHING

## 2020-01-09 ASSESSMENT — PAIN DESCRIPTION - LOCATION
LOCATION: CHEST;THROAT
LOCATION: LEG

## 2020-01-09 ASSESSMENT — PAIN - FUNCTIONAL ASSESSMENT
PAIN_FUNCTIONAL_ASSESSMENT: ACTIVITIES ARE NOT PREVENTED
PAIN_FUNCTIONAL_ASSESSMENT: ACTIVITIES ARE NOT PREVENTED

## 2020-01-09 ASSESSMENT — PAIN DESCRIPTION - FREQUENCY
FREQUENCY: CONTINUOUS
FREQUENCY: CONTINUOUS

## 2020-01-09 ASSESSMENT — PAIN DESCRIPTION - PROGRESSION: CLINICAL_PROGRESSION: NOT CHANGED

## 2020-01-09 ASSESSMENT — PAIN DESCRIPTION - ONSET: ONSET: ON-GOING

## 2020-01-09 NOTE — ED PROVIDER NOTES
ADDENDUM:        The patient was seen for Chest Pain (Sudden onset when climbing the stairs at 1800 today.)  . The patient's initial evaluation and plan have been discussed with the prior provider who initially evaluated the patient. Nursing Notes, Past Medical Hx, Past Surgical Hx, Social Hx, Allergies, and Family Hx were all reviewed. Diagnostic Results     EKG         LABS:   Results for orders placed or performed during the hospital encounter of 01/08/20   Urinalysis Reflex to Culture   Result Value Ref Range    Color, UA YELLOW YELLOW    Turbidity UA CLEAR CLEAR    Glucose, Ur NEGATIVE NEGATIVE    Bilirubin Urine NEGATIVE NEGATIVE    Ketones, Urine NEGATIVE NEGATIVE    Specific Gravity, UA 1.007 1.005 - 1.030    Urine Hgb NEGATIVE NEGATIVE    pH, UA 7.0 5.0 - 8.0    Protein, UA NEGATIVE NEGATIVE    Urobilinogen, Urine Normal Normal    Nitrite, Urine NEGATIVE NEGATIVE    Leukocyte Esterase, Urine NEGATIVE NEGATIVE    Urinalysis Comments       Microscopic exam not performed based on chemical results unless requested in original order. Urinalysis Comments          Urinalysis Comments       Utilizing a urinalysis as the only screening method to exclude a potential uropathogen can be unreliable in many patient populations. Rapid screening tests are less sensitive than culture and if UTI is a clinical possibility, culture should be considered despite a negative urinalysis.    Troponin   Result Value Ref Range    Troponin, High Sensitivity <6 0 - 14 ng/L    Troponin T NOT REPORTED <0.03 ng/mL    Troponin Interp NOT REPORTED    Troponin   Result Value Ref Range    Troponin, High Sensitivity <6 0 - 14 ng/L    Troponin T NOT REPORTED <0.03 ng/mL    Troponin Interp NOT REPORTED    CBC Auto Differential   Result Value Ref Range    WBC 7.0 3.5 - 11.0 k/uL    RBC 4.86 4.0 - 5.2 m/uL    Hemoglobin 14.3 12.0 - 16.0 g/dL    Hematocrit 42.8 36 - 46 %    MCV 88.1 80 - 100 fL    MCH 29.4 26 - 34 pg    MCHC 33.4 31 - injection 40 mg         RECENT VITALS:  BP (!) 174/85   Pulse 61   Temp 98.1 °F (36.7 °C) (Oral)   Resp 16   Ht 5' 1.5\" (1.562 m)   Wt 59 kg (130 lb)   SpO2 99%   BMI 24.17 kg/m²     9:30 PM reevaluated patient resting comfortably no chest pain. Repeat troponin and EKG negative. Patient describes exertional chest pain with shortness of breath and diaphoresis with radiation from her chest up into her jaw. admitted a month ago and had a stress test done that was negative. Concerning symptoms she has a history of hypertension hyperlipidemia and a strong family history of cardiac disease. Patient agreeable to admission. Initially wanted to go to [place] [address] but there are no beds available. Discussed with Jorge Hay who agrees to admission to [facility]. Awaiting bed assignment and transport. 11 PM no beds available at [facility]. Discussed with Jorge Hay through [facility] who is agreeable to keep the patient here for cardiology to see and discuss further options in the morning. Is agreeable she feels that this is indigestion which may be true however we must rule out cardiac etiology before we can blame it on indigestion. OARRS Report if indicated             I have reviewed the disposition diagnosis with the patient and or their family/guardian. I have answered their questions and given discharge instructions. They voiced understanding of these instructions and did not have any further questions or complaints. Disposition     CLINICAL IMPRESSION:  1.  Chest pain, unspecified type        PATIENT REFERRED TO:  Traci Louis MD  [address] [place] [address]  [phone]            DISCHARGE MEDICATIONS:  New Prescriptions    No medications on file       DISPOSITION:   DISPOSITION Admitted 01/08/2020 10:13:14 PM      (Please note that portions of this note were completed with a voice recognition program.  Efforts were made to edit the dictations but

## 2020-01-09 NOTE — PROGRESS NOTES
Pt resting in bed with her  at bedside. Pt states she is feeling better and wants to go home. States she has not had any chest pain since being treated in the ER prior to her arrival to the unit. Pt is alert and oriented. Denies H/A, dizziness and no SOB. Pt refusing all medication this am; blood pressure running lower than usual for her this am 106/59; HR at 71. Pt instructed to call out for assist; pt agreed. Continue to monitor.

## 2020-01-09 NOTE — PLAN OF CARE
Problem: Pain:  Goal: Control of acute pain  Description  Control of acute pain  1/9/2020 1546 by Marquez Pepe RN  Outcome: Met This Shift     Problem: Falls - Risk of:  Goal: Will remain free from falls  Description  Will remain free from falls  1/9/2020 1546 by Marquez Pepe RN  Outcome: Met This Shift

## 2020-01-09 NOTE — ED NOTES
C/O \"indigestion\", Dr Whitfield Million updated and orders input     Rusty White RN  01/09/20 2659

## 2020-01-09 NOTE — H&P
[address] [facility]    HISTORY AND PHYSICAL EXAMINATION            Date:   1/9/2020  Patient name:  Violette Capps  Date of admission:  1/8/2020  6:19 PM  MRN:   [id number]  Account:  [de-identified]  YOB: 1940  PCP:    Michelet Eli MD  Room:   331/331-01  Code Status:    Full Code    Chief Complaint:     Chief Complaint   Patient presents with    Chest Pain     Sudden onset when climbing the stairs at 1800 today. History Obtained From:     patient, electronic medical record    History of Present Illness:     Violette Capps is a [de-identified] y.o. female who presents with Chest Pain (Sudden onset when climbing the stairs at 1800 today.)  She was admitted through the ED Jan 8 for the management of Chest pain  Patient was admitted December 15 for chest pain and had a negative nuclear stress test.    She noted chest pain while sitting on the couch. She had just climbed the basement stairs. The pain radiated across the chest to the rt neck. She denied cough or SOB. There was no recent travel or sick contact. B/p was 198/102 on admit to ED. She was given Lopressor 100 mg, Protonix 40 mg and Ativan 0.5 mg IV in the ED. Her pain resolved and she is anxious to go home.       The patient is having trouble providing historical data     Pertinent diagnostics on admission:  WBC 7, Hgb 14.3, platelets 021 glucose 112, BUN/creat 11/0.80, Na 133, d-dimer 0.62, troponin < 6 x 2 & 8    UA: WNL    CT head: No acute intracranial abnormality    CTA chest: No pulmonary embolism or acute abnormality    Past Medical History:     Past Medical History:   Diagnosis Date    Hyperlipidemia     Hypertension         Past Surgical History:     Past Surgical History:   Procedure Laterality Date    ABDOMEN SURGERY      colon resection    BLADDER SUSPENSION      HYSTERECTOMY      LIGATION OF SAPHENOUS VEIN          Medications Prior to Admission:     Prior to Admission GFR Staging NOT REPORTED    D-Dimer, Quantitative    Collection Time: 01/08/20  6:25 PM   Result Value Ref Range    D-Dimer, Quant 0.62 mg/L FEU   Urinalysis Reflex to Culture    Collection Time: 01/08/20  6:55 PM   Result Value Ref Range    Color, UA YELLOW YELLOW    Turbidity UA CLEAR CLEAR    Glucose, Ur NEGATIVE NEGATIVE    Bilirubin Urine NEGATIVE NEGATIVE    Ketones, Urine NEGATIVE NEGATIVE    Specific Gravity, UA 1.007 1.005 - 1.030    Urine Hgb NEGATIVE NEGATIVE    pH, UA 7.0 5.0 - 8.0    Protein, UA NEGATIVE NEGATIVE    Urobilinogen, Urine Normal Normal    Nitrite, Urine NEGATIVE NEGATIVE    Leukocyte Esterase, Urine NEGATIVE NEGATIVE    Urinalysis Comments       Microscopic exam not performed based on chemical results unless requested in original order. Urinalysis Comments          Urinalysis Comments       Utilizing a urinalysis as the only screening method to exclude a potential uropathogen can be unreliable in many patient populations. Rapid screening tests are less sensitive than culture and if UTI is a clinical possibility, culture should be considered despite a negative urinalysis.    Troponin    Collection Time: 01/08/20  8:30 PM   Result Value Ref Range    Troponin, High Sensitivity <6 0 - 14 ng/L    Troponin T NOT REPORTED <0.03 ng/mL    Troponin Interp NOT REPORTED    Troponin    Collection Time: 01/09/20 12:36 AM   Result Value Ref Range    Troponin, High Sensitivity 8 0 - 14 ng/L    Troponin T NOT REPORTED <0.03 ng/mL    Troponin Interp NOT REPORTED    Troponin    Collection Time: 01/09/20  4:47 AM   Result Value Ref Range    Troponin, High Sensitivity 8 0 - 14 ng/L    Troponin T NOT REPORTED <0.03 ng/mL    Troponin Interp NOT REPORTED    CBC    Collection Time: 01/09/20  4:47 AM   Result Value Ref Range    WBC 5.6 3.5 - 11.0 k/uL    RBC 4.58 4.0 - 5.2 m/uL    Hemoglobin 13.5 12.0 - 16.0 g/dL    Hematocrit 40.9 36 - 46 %    MCV 89.3 80 - 100 fL    MCH 29.4 26 - 34 pg    MCHC 33.0 31 Essential hypertension 1/9/2020 Yes    Hyperlipidemia 1/9/2020 Yes    Anxiety 1/9/2020 Yes    Venous thromboembolism (VTE) 1/9/2020 Yes          Plan:     Patient status observation in the Med/Surge     Blood Pressure - Monitor and control  Consider secondary etiologies of HTN  Reflux precautions  Risk factor management   Correct electrolyte abnormalities   DVT prophylaxis       Consultations:   None    Patient is admitted as observation status because of co-morbidities listed above, severity of signs and symptoms as outlined, requirement for current medical therapies and most importantly because of direct risk to patient if care not provided in a hospital setting. RIC Tomas - CNS  1/9/2020  10:45 AM     I have seen and examined [facility] and the key elements of all parts of the encounter have been performed by me. I agree with the assessment, plan and orders as documented by the Advanced Practice Provider. Any modifications to the exam findings and the plan of treatment is as below and the final version is my approved version of the assessment. Additional Comments:    The patient is up to the bedside chair  She is dressed  She hopes to go home to her cat  Her chest pain resolved promptly after presenting to the emergency room  The pain was similar to that experienced on her last admission  Stress test last month was negative  CTA negative  Patient now believes she was simply experiencing some indigestion  Her blood pressures have improved    Patient awake   She is having some difficulty with historical data  Heart is regular in rate and rhythm  Lungs are clear, no rales or rhonchi /wheeze  Abdomen is soft, nontender, bowel sounds present  Extremities no edema, no tenderness  No gross motor or sensory deficits  No cyanosis    Sodium 133Low   Troponin, High Sensitivity <6   Triglycerides 153High   Cholesterol 205High     Patient appears stable  She is requesting discharge  She will monitor

## 2020-01-09 NOTE — CARE COORDINATION
Case Management Initial Discharge Plan  [place],             Met with:family member patient and  to discuss discharge plans. Information verified: address, contacts, phone number, , insurance Yes  PCP: Brandon Waller MD  Date of last visit: Friday    Insurance Provider: Cruz Wyatt    Discharge Planning    Living Arrangements:  Spouse/Significant Other   Support Systems:  Spouse/Significant Other, Children, Family Members    Home has 3 stories  2 stairs to climb to get into front door, 14 stairs to climb to reach second floor  Location of bedroom/bathroom in home 2nd floor, also has basement that is fully functional with office space and laundry    Patient able to perform ADL's:Independent    Current Services (outpatient & in home) none  DME equipment: none  DME provider: n/a      Potential Assistance Needed:  N/A    Patient agreeable to home care: No  [facility] of choice provided:  n/a    Prior SNF/Rehab Placement and Facility: no  Agreeable to SNF/Rehab: No  [facility] of choice provided: n/a   Evaluation: no    Expected Discharge date:  01/10/20  Patient expects to be discharged to:  home   Follow Up Appointment: Best Day/ Time:     Transportation provider: self vs spouse  Transportation arrangements needed for discharge: No    Readmission Risk              Risk of Unplanned Readmission:        10             Does patient have a readmission risk score greater than 14?: No  If yes, follow-up appointment must be made within 7 days of discharge. Goals of Care: Remain free from chest pain. Patient thinks pain may have been indigestion. She has pantoprazole at home for indigestion. She is independent with activity and does not do heavy lifting. She is active in house and constantly going up and down stairs. Discharge Plan: Plan to DC home.            Electronically signed by Anil Gomez RN, BSN on 20 at 9:29 AM

## 2020-01-10 LAB
EKG ATRIAL RATE: 62 BPM
EKG ATRIAL RATE: 82 BPM
EKG P AXIS: 119 DEGREES
EKG P AXIS: 36 DEGREES
EKG P-R INTERVAL: 170 MS
EKG P-R INTERVAL: 194 MS
EKG Q-T INTERVAL: 426 MS
EKG Q-T INTERVAL: 454 MS
EKG QRS DURATION: 102 MS
EKG QRS DURATION: 96 MS
EKG QTC CALCULATION (BAZETT): 460 MS
EKG QTC CALCULATION (BAZETT): 497 MS
EKG R AXIS: -35 DEGREES
EKG R AXIS: -36 DEGREES
EKG T AXIS: 70 DEGREES
EKG T AXIS: 87 DEGREES
EKG VENTRICULAR RATE: 62 BPM
EKG VENTRICULAR RATE: 82 BPM

## 2020-01-10 NOTE — DISCHARGE SUMMARY
evidence of intraluminal filling defect to suggest pulmonary embolism. Main pulmonary artery is normal in caliber. Mediastinum: No evidence of mediastinal lymphadenopathy. The heart is borderline enlarged. Calcific coronary artery disease. The heart and pericardium demonstrate no acute abnormality. There is no acute abnormality of the thoracic aorta. Lungs/pleura: The lungs are without acute process. No focal consolidation or pulmonary edema. No evidence of pleural effusion or pneumothorax. Upper Abdomen: Limited images of the upper abdomen are unremarkable. Soft Tissues/Bones: No acute bone or soft tissue abnormality. Mild concavity of the superior endplate of Z76 incompletely visualized. No evidence of pulmonary embolism or acute pulmonary abnormality. Nm Myocardial Spect Rest Exercise Or Rx    Result Date: 12/16/2019  EXAMINATION: MYOCARDIAL PERFUSION IMAGING 12/16/2019 9:43 am TECHNIQUE: Rest dose:  38.5 mCi Tc-99m sestamibi intravenously Stress dose:  12.3 mCi Tc-99m sestamibi intravenously Under cardiology supervision, 0.4 mg Lexiscan was infused intravenously prior to injection of the stress dose. SPECT imaging was acquired following injection of the sestamibi. ECG gating was obtained following the stress acquisition. COMPARISON: None Available. HISTORY: ORDERING SYSTEM PROVIDED HISTORY: Chest pain TECHNOLOGIST PROVIDED HISTORY: Reason for Exam: Chest pain Procedure Type->Rx chest pain Reason for Exam: Chest pain Acuity: Acute Type of Exam: Initial 60-year-old female with chest pain FINDINGS: The patient achieved a maximum heart rate of 101 beats per minute, 72 % of the maximum age predicted heart rate of 140 beats per minute. Perfusion: There is no scintigraphic evidence for a reversible or fixed perfusion defect to suggest reversible ischemia or infarct. Function: The gated SPECT data demonstrates normal left ventricular size and normal wall motion.  Left ventricular ejection fraction:  67%

## 2020-10-31 ENCOUNTER — APPOINTMENT (OUTPATIENT)
Dept: ULTRASOUND IMAGING | Age: 80
End: 2020-10-31
Payer: MEDICARE

## 2020-10-31 ENCOUNTER — HOSPITAL ENCOUNTER (EMERGENCY)
Age: 80
Discharge: HOME | End: 2020-10-31
Attending: EMERGENCY MEDICINE
Payer: MEDICARE

## 2020-10-31 VITALS
OXYGEN SATURATION: 92 % | DIASTOLIC BLOOD PRESSURE: 85 MMHG | RESPIRATION RATE: 20 BRPM | HEART RATE: 75 BPM | SYSTOLIC BLOOD PRESSURE: 169 MMHG | BODY MASS INDEX: 24.17 KG/M2 | HEIGHT: 62 IN | TEMPERATURE: 98.1 F

## 2020-10-31 LAB
-: ABNORMAL
AMORPHOUS: ABNORMAL
BACTERIA: ABNORMAL
BILIRUBIN URINE: NEGATIVE
CASTS UA: ABNORMAL /LPF
COLOR: ABNORMAL
COMMENT UA: ABNORMAL
CRYSTALS, UA: ABNORMAL /HPF
EPITHELIAL CELLS UA: ABNORMAL /HPF (ref 0–5)
GLUCOSE URINE: ABNORMAL
KETONES, URINE: NEGATIVE
LEUKOCYTE ESTERASE, URINE: ABNORMAL
MUCUS: ABNORMAL
NITRITE, URINE: POSITIVE
OTHER OBSERVATIONS UA: ABNORMAL
PH UA: 7 (ref 5–8)
PROTEIN UA: ABNORMAL
RBC UA: ABNORMAL /HPF (ref 0–2)
RENAL EPITHELIAL, UA: ABNORMAL /HPF
SPECIFIC GRAVITY UA: 1.01 (ref 1–1.03)
TRICHOMONAS: ABNORMAL
TURBIDITY: ABNORMAL
URINE HGB: NEGATIVE
UROBILINOGEN, URINE: ABNORMAL
WBC UA: ABNORMAL /HPF (ref 0–5)
YEAST: ABNORMAL

## 2020-10-31 PROCEDURE — 87086 URINE CULTURE/COLONY COUNT: CPT

## 2020-10-31 PROCEDURE — 93971 EXTREMITY STUDY: CPT

## 2020-10-31 PROCEDURE — 87186 SC STD MICRODIL/AGAR DIL: CPT

## 2020-10-31 PROCEDURE — 99283 EMERGENCY DEPT VISIT LOW MDM: CPT

## 2020-10-31 PROCEDURE — 87088 URINE BACTERIA CULTURE: CPT

## 2020-10-31 PROCEDURE — 81001 URINALYSIS AUTO W/SCOPE: CPT

## 2020-10-31 RX ORDER — CEPHALEXIN 500 MG/1
500 CAPSULE ORAL 4 TIMES DAILY
Qty: 28 CAPSULE | Refills: 0 | Status: SHIPPED | OUTPATIENT
Start: 2020-10-31 | End: 2020-11-07

## 2020-10-31 RX ORDER — CEPHALEXIN 500 MG/1
500 CAPSULE ORAL 4 TIMES DAILY
Qty: 21 CAPSULE | Refills: 0 | Status: SHIPPED | OUTPATIENT
Start: 2020-10-31 | End: 2020-10-31 | Stop reason: SDUPTHER

## 2020-10-31 ASSESSMENT — PAIN SCALES - GENERAL: PAINLEVEL_OUTOF10: 8

## 2020-10-31 NOTE — ED PROVIDER NOTES
having burning and frequency and urgency of urination. notes that she gets UTIs 2-3 times per year and this feels similar. Denies fever, chills, knee pain, hip pain, chest pain, shortness of breath, flank pain, abdominal pain, or any other complaints at this time. Of note, patient does have a primary care appointment in 2 days and has an appointment with the vascular specialist in 9 days. Patient also adds that she was seen at an urgent care at the beginning of the month for this as well and they did have difficulty palpating her pulse at that time. PAST MEDICAL / SURGICAL / SOCIAL / FAMILY HISTORY     PMH:  has a past medical history of Accelerated hypertension, Atypical chest pain, Hyperlipidemia, Hypertension, and Labile blood pressure. Surgical History:  has a past surgical history that includes Hysterectomy; Abdomen surgery; LIGATION OF SAPHENOUS VEIN; and bladder suspension. Social History:  reports that she has quit smoking. She has never used smokeless tobacco. She reports previous alcohol use. She reports that she does not use drugs. Family History: She indicated that her mother is . She indicated that her father is . She indicated that two of her three brothers are alive. family history includes Alzheimer's Disease (age of onset: 67) in her father; Heart Attack (age of onset: 62) in her brother; Heart Disease in her brother and brother; Heart Disease (age of onset: 78) in her mother. Psychiatric History: None    Allergies: Morphine; Doxycycline; Azithromycin; and Ciprofloxacin    Home Medications:   Prior to Admission medications    Medication Sig Start Date End Date Taking?  Authorizing Provider   cephALEXin (KEFLEX) 500 MG capsule Take 1 capsule by mouth 4 times daily for 7 days 10/31/20 11/7/20 Yes Esteban Aviles PA-C   pantoprazole (PROTONIX) 40 MG tablet Take 1 tablet by mouth daily 20   Miriam Wagner DO   aspirin 81 MG chewable tablet Take 81 mg by mouth daily Historical Provider, MD   acebutolol (SECTRAL) 400 MG capsule Take 400 mg by mouth daily    Historical Provider, MD   lovastatin (MEVACOR) 20 MG tablet Take 20 mg by mouth daily    Historical Provider, MD   estradiol (ESTRACE) 0.5 MG tablet Take 0.25 mg by mouth daily    Historical Provider, MD   Omega-3 Fatty Acids (FISH OIL PO) Take by mouth    Historical Provider, MD   Cholecalciferol (VITAMIN D3) 50 MCG (2000 UT) CAPS Take by mouth    Historical Provider, MD   Misc Natural Products (GLUCOSAMINE-CHONDROITIN PLUS PO) Take by mouth    Historical Provider, MD       REVIEW OF SYSTEMS  (2-9 systems for level 4, 10 ormore for level 5)      Review of Systems    Constitutional: See HPI. Denies fever or chills. Eyes: Denies vision changes. HENT: Denies sore throat or neck pain. Respiratory: Denies cough or shortness of breath. Cardiovascular: Denies chest pain. GI: Denies vomiting or diarrhea. : Denies painful urination. Musculoskeletal: Denies recent trauma. Skin: Denies new rashes or wounds. Neurologic:  Denies new numbness or weakness. Psychiatric: Denies sleep disturbances. Endocrine:  Denies unexpected weight loss  Heme: Denies bleeding disorders. All other systems negative except as marked. PHYSICAL EXAM  (up to 7 for level 4, 8 or more for level 5)      INITIAL VITALS:  height is 5' 1.5\" (1.562 m). Her oral temperature is 98.1 °F (36.7 °C). Her blood pressure is 169/85 (abnormal) and her pulse is 75. Her respiration is 20 and oxygen saturation is 92%. Vital signs reviewed. Physical Exam    General:  Alert, cooperative, well-groomed, well-nourished, appears stated age, and is in no acute distress. Head:  Normocephalic, atraumatic, and without obvious abnormality. Eyes:  Sclerae/conjunctivae clear without injection, pallor, or icterus. Corneas clear without opacities. EOM's intact. ENT: Ears and nose are all without obvious masses lesion or deformity.  No oropharynx examination performed due to aerosolization risk during COVID-19 pandemic. Neck: Supple and symmetrical. Trachea midline. No adenopathy. No jugular venous distention. Lungs:   No respiratory distress. Clear to auscultation bilaterally. No wheezes, rhonchi, or rales. Heart:  Regular rate. Regular rhythm. No murmurs, rubs, or gallops. Abdomen:   Normoactive bowel sounds. Soft, nontender, nondistended without guarding or rebound. No palpable masses. No tenderness at McBurney's point. No suprapubic tenderness. No CVA tenderness. Lower extremities: The bilateral lower extremities are warm without erythema, edema, purulence or lesions/rash. The right calf is tender to palpation primarily along the lateral and posterior aspects. No palpable cord. No tenderness to palpation of the posterior knee. No tenderness to palpation of the left calf or knee. The DP pulses on the right were not palpable and the left were 1+. PT pulses were 1+ bilaterally. Capillary refill ~ 3-4+ seconds bilaterally. Telangiectasias consistent with spider veins noted to the bilateral lower extremities. Slight varicose veins noted bilaterally as well. Strength of the ankle, knee, hip joints are 5/5. Patellar and Achilles reflexes 2+. Sensation intact to light touch. Skin: Soft, good turgor, and well-hydrated. No obvious rashes. Neurologic: GCS is 15 and no focal deficits are appreciated. Normal gait. Grossly normal motor and sensation. Speech clear. Psychiatric: Normal mood and affect. Normal behavior. Coherent thought process. DIFFERENTIAL DIAGNOSIS / MDM     Patient presents to emergency department with chronic bilateral great toe pain and worsening right lower extremity aching with tenderness to palpation to the right calf. Vital signs are unremarkable. Patient is afebrile, nontoxic and in no acute distress. Physical exam of the bilateral feet demonstrate exam of the great toenails consistent with fungal infections. There is no surrounding erythema, warmth, purulence, edema to the digits or foot, and therefore, I do not suspect cellulitis that would require antibiotics. The bilateral lower extremities are warm and pink though there is slightly decreased capillary refill. No lesions, ulcerations, or signs of necrosis. DP pulses on the right were unable to be palpated and staff was unable to find the right DP pulse with a Doppler. DP pulse was 1+ on the left on palpation. PT pulses were palpated and were 1+ bilaterally. I do not suspect an acute arterial occlusion at this time based on the physical exam and history that appears chronic in nature. I suspect that her RLE calf pain is likely secondary to decreased blood flow secondary to peripheral artery disease vs neuropathy of unknown causation, however, will obtain ultrasound to rule out a DVT as the patient has been more sedentary lately and is on a low-dose estrogen. Additionally, I will obtain a urinalysis to check for a UTI based on her symptoms and treat her for such if positive. There is no abdominal pain, peritoneal signs, CVA tenderness or signs of systemic infection and I do not suspect pyelonephritis at this time. Patient has been taking ibuprofen and took some just a couple hours prior to arrival and declined medication at this time.     PLAN (LABS / IMAGING / EKG):  Orders Placed This Encounter   Procedures    Culture, Urine    US DUP LOWER EXTREMITY RIGHT MARIUSZ    Urinalysis Reflex to Culture    Microscopic Urinalysis       MEDICATIONS ORDERED:  Orders Placed This Encounter   Medications    DISCONTD: cephALEXin (KEFLEX) 500 MG capsule     Sig: Take 1 capsule by mouth 4 times daily     Dispense:  21 capsule     Refill:  0    cephALEXin (KEFLEX) 500 MG capsule     Sig: Take 1 capsule by mouth 4 times daily for 7 days     Dispense:  28 capsule     Refill:  0       Controlled Substances Monitoring:     DIAGNOSTIC RESULTS     EKG: All EKG's are interpreted by the Emergency Department Physician who either signs or Co-signs this chart in the [address] a cardiologist.    RADIOLOGY:  Non-plain film images such as CT, Ultrasound and MRI are read by the radiologist. Plain radiographic images are visualized by me and the following radiologist interpretations are reviewed. US DUP LOWER EXTREMITY RIGHT MARIUSZ   Final Result   No findings of deep venous thrombosis in the right lower extremity. LABS:  Results for orders placed or performed during the hospital encounter of 10/31/20   Urinalysis Reflex to Culture    Specimen: Urine, clean catch   Result Value Ref Range    Color, UA NAHDI (A) YELLOW    Turbidity UA SLIGHTLY CLOUDY (A) CLEAR    Glucose, Ur 1+ (A) NEGATIVE    Bilirubin Urine NEGATIVE NEGATIVE    Ketones, Urine NEGATIVE NEGATIVE    Specific Gravity, UA 1.010 1.005 - 1.030    Urine Hgb NEGATIVE NEGATIVE    pH, UA 7.0 5.0 - 8.0    Protein, UA 2+ (A) NEGATIVE    Urobilinogen, Urine ELEVATED (A) Normal    Nitrite, Urine POSITIVE (A) NEGATIVE    Leukocyte Esterase, Urine MODERATE (A) NEGATIVE    Urinalysis Comments       INTERPRET WITH CAUTION DUE TO INTENSE COLOR OF URINE. Microscopic Urinalysis   Result Value Ref Range    -          WBC, UA TOO NUMEROUS TO COUNT 0 - 5 /HPF    RBC, UA 0 TO 2 0 - 2 /HPF    Casts UA NOT REPORTED /LPF    Crystals, UA NOT REPORTED None /HPF    Epithelial Cells UA 0 TO 2 0 - 5 /HPF    Renal Epithelial, UA NOT REPORTED 0 /HPF    Bacteria, UA MODERATE (A) None    Mucus, UA NOT REPORTED None    Trichomonas, UA NOT REPORTED None    Amorphous, UA 2+ (A) None    Other Observations UA Culture ordered based on defined criteria. (A) NOT REQ. Yeast, UA NOT REPORTED None       EMERGENCY DEPARTMENT COURSE     ED Course as of Oct 31 1531   Sat Oct 31, 2020   1255 Patient was updated regarding her negative Doppler ultrasound. We also discussed that her urinalysis was significant for a urinary tract infection and we will treat her with Keflex. Patient and I discussed extensively regarding the importance of follow-up as I do believe she can be followed up outpatient and her symptoms appear chronic. She does have a family medicine office visit in 2 days and a visit with her vascular surgeon in 9 days. In regards her to her toenail fungus she was advised to continue doing the topical antifungal and Epson salt soaks and to continue following up with her podiatrist.  We discussed warning signs and symptoms including a cold foot and signs of necrosis or infection. Patient verbalized understanding. [GM]      ED Course User Index  [GM] Esteban Aviles PA-C        Vitals:    Vitals:    10/31/20 1100   BP: (!) 169/85   Pulse: 75   Resp: 20   Temp: 98.1 °F (36.7 °C)   TempSrc: Oral   SpO2: 92%   Height: 5' 1.5\" (1.562 m)     -------------------------  BP: (!) 169/85, Temp: 98.1 °F (36.7 °C), Pulse: 75, Resp: 20      RE-EVALUATION:  See ED Course notes above. The patient and/or family and I and/or the attending have discussed the diagnosis and risks, and we agree with discharging home to follow-up with their primary doctor. The patient appears stable for discharge and has been instructed to return immediately for new concerning symptoms, if the symptoms worsen in any way, or in 8-12 hours if not improved for re-evaluation. We have discussed the symptoms which are most concerning (e.g., fever, changing or worsening pain, persistent vomiting, bloody stools, chest pain, shortness of breath, numbness or weakness to the arms or legs, coolness or color change to the arms or legs) that necessitate immediate return. The patient understands that at this time there is no evidence for a more malignant underlying process, but the patient also understands that early in the process of an illness or injury, an emergency department workup can be falsely reassuring.  Routine discharge counseling was given, and the patient understands that worsening, changing or

## 2020-10-31 NOTE — ED NOTES
Pt presents to the ED today alert and oriented. Pt walked to the exam room independently. Pt complains of bilateral great toe pain and swelling with redness. States that aleve sometimes helps the pain. States that she was seen about two weeks ago for the same thing and told to see a vascular doctor but has not done that yet. Pulses on the left foot were faint and hard to find, unpalpable on the right foot. Pt states that she has had that issue for a long time now. Pt states that she also knows that she has a UTI and bactrim DS usually is what she takes for that and it just wipes it out. At this time pt has no other complaints.        Kamaljit Dunaway RN  10/31/20 1119

## 2020-10-31 NOTE — ED PROVIDER NOTES
[address] [facility] ED  [address] [address] [facility] [address] [facility] [address]  Phone: [phone]  Fax: [phone]      Attending Physician Attestation    I performed a history and physical examination of the patient and discussed management with the mid level provider. I reviewed the mid level provider's note and agree with the documented findings and plan of care. Any areas of disagreement are noted on the chart. I was personally present for the key portions of any procedures. I have documented in the chart those procedures where I was not present during the key portions. I have reviewed the emergency nurses triage note. I agree with the chief complaint, past medical history, past surgical history, allergies, medications, social and family history as documented unless otherwise noted below. Documentation of the HPI, Physical Exam and Medical Decision Making performed by mid level providers is based on my personal performance of the HPI, PE and MDM. For Physician Assistant/ Nurse Practitioner cases/documentation I have personally evaluated this patient and have completed at least one if not all key elements of the E/M (history, physical exam, and MDM). Additional findings are as noted. CHIEF COMPLAINT       Chief Complaint   Patient presents with    Leg Pain         HISTORY OF PRESENT ILLNESS    Ivan Michel is a [de-identified] y.o. female who presents with toenail fungus over last several months. Pain to feet. PAST MEDICAL HISTORY    has a past medical history of Accelerated hypertension, Atypical chest pain, Hyperlipidemia, Hypertension, and Labile blood pressure. SURGICAL HISTORY      has a past surgical history that includes Hysterectomy; Abdomen surgery; LIGATION OF SAPHENOUS VEIN; and bladder suspension.     CURRENT MEDICATIONS       Previous Medications    ACEBUTOLOL (SECTRAL) 400 MG CAPSULE    Take 400 mg by mouth daily    ASPIRIN 81 MG CHEWABLE TABLET    Take 81 mg by mouth daily CHOLECALCIFEROL (VITAMIN D3) 50 MCG (2000) CAPS    Take by mouth    ESTRADIOL (ESTRACE) 0.5 MG TABLET    Take 0.25 mg by mouth daily    LOVASTATIN (MEVACOR) 20 MG TABLET    Take 20 mg by mouth daily    MISC NATURAL PRODUCTS (GLUCOSAMINE-CHONDROITIN PLUS PO)    Take by mouth    OMEGA-3 FATTY ACIDS (FISH OIL PO)    Take by mouth    PANTOPRAZOLE (PROTONIX) 40 MG TABLET    Take 1 tablet by mouth daily       ALLERGIES     is allergic to morphine; doxycycline; azithromycin; and ciprofloxacin. FAMILY HISTORY     She indicated that her mother is . She indicated that her father is . She indicated that two of her three brothers are alive. family history includes Alzheimer's Disease (age of onset: 67) in her father; Heart Attack (age of onset: 62) in her brother; Heart Disease in her brother and brother; Heart Disease (age of onset: 78) in her mother. SOCIAL HISTORY      reports that she has quit smoking. She has never used smokeless tobacco. She reports previous alcohol use. She reports that she does not use drugs. PHYSICAL EXAM     INITIAL VITALS:  height is 5' 1.5\" (1.562 m). Her oral temperature is 98.1 °F (36.7 °C). Her blood pressure is 169/85 (abnormal) and her pulse is 75. Her respiration is 20 and oxygen saturation is 92%. The lower extremities I did not appreciate an overt cellulitis the patient with good capillary refill both lower extremities are warm to the touch no overt calf swelling or tenderness appreciated      DIAGNOSTIC RESULTS         RADIOLOGY:   Non-plain film images such as CT, Ultrasound and MRI are read by the radiologist. Arnav Cleverly radiographic images are visualized and the radiologist interpretations are reviewed as follows:     US DUP LOWER EXTREMITY RIGHT MARIUSZ (Final result)   Result time 10/31/20 12:26:06   Final result by Jahaira Coronel MD (10/31/20 12:26:06)                 Impression:     No findings of deep venous thrombosis in the right lower extremity. Narrative:     EXAMINATION:   DUPLEX VENOUS ULTRASOUND OF THE RIGHT LOWER EXTREMITY     10/31/2020 12:03 pm     TECHNIQUE:   Duplex ultrasound and Doppler images were obtained of the right lower   extremity. COMPARISON:   None     HISTORY:   ORDERING SYSTEM PROVIDED HISTORY: RLE pain - Right calf pain x3-4 months,   decreased cap refill and unpalpable pulses R vs L   TECHNOLOGIST PROVIDED HISTORY:   RLE pain - Right calf pain x3-4 months, decreased cap refill and unpalpable   pulses R vs L   Reason for Exam: Right leg pain for 3-4 months   Acuity: Unknown   Type of Exam: Initial   Additional signs and symptoms: None   Relevant Medical/Surgical History: None     FINDINGS:   Patent right lower extremity veins with normal compressibility and   augmentation. LABS:  Results for orders placed or performed during the hospital encounter of 10/31/20   Urinalysis Reflex to Culture    Specimen: Urine, clean catch   Result Value Ref Range    Color, UA NAHID (A) YELLOW    Turbidity UA SLIGHTLY CLOUDY (A) CLEAR    Glucose, Ur 1+ (A) NEGATIVE    Bilirubin Urine NEGATIVE NEGATIVE    Ketones, Urine NEGATIVE NEGATIVE    Specific Gravity, UA 1.010 1.005 - 1.030    Urine Hgb NEGATIVE NEGATIVE    pH, UA 7.0 5.0 - 8.0    Protein, UA 2+ (A) NEGATIVE    Urobilinogen, Urine ELEVATED (A) Normal    Nitrite, Urine POSITIVE (A) NEGATIVE    Leukocyte Esterase, Urine MODERATE (A) NEGATIVE    Urinalysis Comments       INTERPRET WITH CAUTION DUE TO INTENSE COLOR OF URINE.    Microscopic Urinalysis   Result Value Ref Range    -          WBC, UA TOO NUMEROUS TO COUNT 0 - 5 /HPF    RBC, UA 0 TO 2 0 - 2 /HPF    Casts UA NOT REPORTED /LPF    Crystals, UA NOT REPORTED None /HPF    Epithelial Cells UA 0 TO 2 0 - 5 /HPF    Renal Epithelial, UA NOT REPORTED 0 /HPF    Bacteria, UA MODERATE (A) None    Mucus, UA NOT REPORTED None    Trichomonas, UA NOT REPORTED None    Amorphous, UA 2+ (A) None    Other Observations UA Culture ordered based on defined criteria. (A) NOT REQ. Yeast, UA NOT REPORTED None           EMERGENCY DEPARTMENT COURSE:   Vitals:    Vitals:    10/31/20 1100   BP: (!) 169/85   Pulse: 75   Resp: 20   Temp: 98.1 °F (36.7 °C)   TempSrc: Oral   SpO2: 92%   Height: 5' 1.5\" (1.562 m)     -------------------------  BP: (!) 169/85, Temp: 98.1 °F (36.7 °C), Pulse: 75, Resp: 20      PERTINENT ATTENDING PHYSICIAN COMMENTS:    Ultrasound shows no DVT UA is consistent with a urinary tract infection the patient states she is previously taking Keflex with good success so we will write a prescription for Keflex she is to return to the ER for abdominal pain fever vomiting coolness or color change of the leg or other concerns otherwise to follow-up with her family doctor within the next few days  The patient presents with a Urinary Tract Infection. Evaluation of the abdomen and back is benign. No guarding, peritoneal signs, sepsis, toxicity, significant tenderness, life threatening or serious etiology was noted. The patient is tolerating PO intake. The patient appears stable for discharge and has been instructed to return immediately if the symptoms worsen in any way, or in 1-2 days if not improved for re-evaluation. We also discussed returning to the Emergency Department immediately if new or worsening symptoms occur. We have discussed the symptoms which are most concerning (e.g., abdominal or back pain, fever, a feeling of passing out, light headed, dizziness, chest pain, shortness of breath, persistent nausea and/or vomiting, numbness or weakness to the arms or legs, coolness or color change of the arms or legs) that necessitate immediate return. The patient understands that at this time there is no evidence for a more malignant underlying process, but the patient also understands that early in the process of an illness or injury, an emergency department workup can be falsely reassuring.   Routine discharge counseling was given, and the patient understands that worsening, changing or persistent symptoms should prompt an immediate call or follow up with their primary physician or return to the emergency department. The importance of appropriate follow up was also discussed. I have reviewed the disposition diagnosis with the patient and or their family/guardian. I have answered their questions and given discharge instructions. They voiced understanding of these instructions and did not have any further questions or complaints. (Please note that portions of this note were completed with a voice recognition program.  Efforts were made to edit the dictations but occasionally words are mis-transcribed.)    Hubbard MD, F.A.C.E.P.   Attending Emergency Medicine Physician       Gui Hemphill MD  10/31/20 [phone]

## 2020-10-31 NOTE — ED NOTES
Called Yaquelin fagan [facility] for doppler at this time.       Pablo Garg RN  10/31/20 4204

## 2020-11-02 LAB
CULTURE: ABNORMAL
Lab: ABNORMAL
SPECIMEN DESCRIPTION: ABNORMAL

## 2020-11-03 PROBLEM — I10 ESSENTIAL HYPERTENSION: Status: RESOLVED | Noted: 2019-12-16 | Resolved: 2020-11-03

## 2021-01-28 ENCOUNTER — HOSPITAL ENCOUNTER (INPATIENT)
Age: 81
LOS: 6 days | Discharge: HOME HEALTH | DRG: 871 | End: 2021-02-03
Attending: EMERGENCY MEDICINE | Admitting: INTERNAL MEDICINE
Payer: MEDICARE

## 2021-01-28 ENCOUNTER — APPOINTMENT (OUTPATIENT)
Dept: GENERAL RADIOLOGY | Age: 81
DRG: 871 | End: 2021-01-28
Payer: MEDICARE

## 2021-01-28 ENCOUNTER — APPOINTMENT (OUTPATIENT)
Dept: ULTRASOUND IMAGING | Age: 81
DRG: 871 | End: 2021-01-28
Payer: MEDICARE

## 2021-01-28 DIAGNOSIS — R65.20 SEPSIS DUE TO ESCHERICHIA COLI WITH ACUTE RENAL FAILURE WITHOUT SEPTIC SHOCK, UNSPECIFIED ACUTE RENAL FAILURE TYPE (HCC): ICD-10-CM

## 2021-01-28 DIAGNOSIS — F03.90 DEMENTIA WITHOUT BEHAVIORAL DISTURBANCE, UNSPECIFIED DEMENTIA TYPE: ICD-10-CM

## 2021-01-28 DIAGNOSIS — N30.01 ACUTE CYSTITIS WITH HEMATURIA: ICD-10-CM

## 2021-01-28 DIAGNOSIS — I10 ESSENTIAL HYPERTENSION: ICD-10-CM

## 2021-01-28 DIAGNOSIS — N17.9 SEPSIS DUE TO ESCHERICHIA COLI WITH ACUTE RENAL FAILURE WITHOUT SEPTIC SHOCK, UNSPECIFIED ACUTE RENAL FAILURE TYPE (HCC): ICD-10-CM

## 2021-01-28 DIAGNOSIS — A41.51 SEPSIS DUE TO ESCHERICHIA COLI WITH ACUTE RENAL FAILURE WITHOUT SEPTIC SHOCK, UNSPECIFIED ACUTE RENAL FAILURE TYPE (HCC): ICD-10-CM

## 2021-01-28 DIAGNOSIS — N39.0 ACUTE UTI: Primary | ICD-10-CM

## 2021-01-28 DIAGNOSIS — Z87.440 HISTORY OF RECURRENT UTIS: Chronic | ICD-10-CM

## 2021-01-28 DIAGNOSIS — A41.9 SEPTICEMIA (HCC): ICD-10-CM

## 2021-01-28 PROBLEM — R79.89 ELEVATED BRAIN NATRIURETIC PEPTIDE (BNP) LEVEL: Status: ACTIVE | Noted: 2021-01-28

## 2021-01-28 PROBLEM — K21.9 GASTROESOPHAGEAL REFLUX DISEASE: Status: ACTIVE | Noted: 2021-01-28

## 2021-01-28 PROBLEM — N12 PYELONEPHRITIS: Status: ACTIVE | Noted: 2021-01-28

## 2021-01-28 PROBLEM — E87.6 HYPOKALEMIA: Status: ACTIVE | Noted: 2021-01-28

## 2021-01-28 PROBLEM — E86.0 DEHYDRATION: Status: ACTIVE | Noted: 2021-01-28

## 2021-01-28 PROBLEM — E83.42 HYPOMAGNESEMIA: Status: ACTIVE | Noted: 2021-01-28

## 2021-01-28 PROBLEM — I51.7 CARDIOMEGALY: Status: ACTIVE | Noted: 2021-01-28

## 2021-01-28 PROBLEM — Z86.79 HISTORY OF VENTRICULAR FIBRILLATION: Status: ACTIVE | Noted: 2021-01-28

## 2021-01-28 PROBLEM — R77.8 ELEVATED TROPONIN: Status: ACTIVE | Noted: 2021-01-28

## 2021-01-28 LAB
-: ABNORMAL
-: NORMAL
ABSOLUTE EOS #: 0.05 K/UL (ref 0–0.4)
ABSOLUTE IMMATURE GRANULOCYTE: ABNORMAL K/UL (ref 0–0.3)
ABSOLUTE LYMPH #: 0.09 K/UL (ref 1–4.8)
ABSOLUTE MONO #: 0 K/UL (ref 0.1–0.8)
AMORPHOUS: ABNORMAL
ANION GAP SERPL CALCULATED.3IONS-SCNC: 11 MMOL/L (ref 9–17)
ANION GAP SERPL CALCULATED.3IONS-SCNC: 9 MMOL/L (ref 9–17)
BACTERIA: ABNORMAL
BASOPHILS # BLD: 0 % (ref 0–2)
BASOPHILS ABSOLUTE: 0 K/UL (ref 0–0.2)
BILIRUBIN URINE: ABNORMAL
BNP INTERPRETATION: ABNORMAL
BUN BLDV-MCNC: 22 MG/DL (ref 8–23)
BUN BLDV-MCNC: 25 MG/DL (ref 8–23)
BUN/CREAT BLD: ABNORMAL (ref 9–20)
BUN/CREAT BLD: ABNORMAL (ref 9–20)
CALCIUM SERPL-MCNC: 8 MG/DL (ref 8.6–10.4)
CALCIUM SERPL-MCNC: 8.1 MG/DL (ref 8.6–10.4)
CASTS UA: ABNORMAL /LPF
CHLORIDE BLD-SCNC: 103 MMOL/L (ref 98–107)
CHLORIDE BLD-SCNC: 106 MMOL/L (ref 98–107)
CO2: 22 MMOL/L (ref 20–31)
CO2: 22 MMOL/L (ref 20–31)
COLOR: ABNORMAL
COMMENT UA: ABNORMAL
CREAT SERPL-MCNC: 2.12 MG/DL (ref 0.5–0.9)
CREAT SERPL-MCNC: 2.15 MG/DL (ref 0.5–0.9)
CREATININE URINE: 84.7 MG/DL (ref 28–217)
CRYSTALS, UA: ABNORMAL /HPF
DIFFERENTIAL TYPE: ABNORMAL
EOSINOPHILS RELATIVE PERCENT: 1 % (ref 1–4)
EPITHELIAL CELLS UA: ABNORMAL /HPF (ref 0–5)
GFR AFRICAN AMERICAN: 27 ML/MIN
GFR AFRICAN AMERICAN: 27 ML/MIN
GFR NON-AFRICAN AMERICAN: 22 ML/MIN
GFR NON-AFRICAN AMERICAN: 22 ML/MIN
GFR SERPL CREATININE-BSD FRML MDRD: ABNORMAL ML/MIN/{1.73_M2}
GLUCOSE BLD-MCNC: 100 MG/DL (ref 70–99)
GLUCOSE BLD-MCNC: 94 MG/DL (ref 70–99)
GLUCOSE URINE: ABNORMAL
HCT VFR BLD CALC: 33 % (ref 36–46)
HEMOGLOBIN: 10.7 G/DL (ref 12–16)
IMMATURE GRANULOCYTES: ABNORMAL %
KETONES, URINE: NEGATIVE
LACTIC ACID, SEPSIS WHOLE BLOOD: ABNORMAL MMOL/L (ref 0.5–1.9)
LACTIC ACID, SEPSIS WHOLE BLOOD: NORMAL MMOL/L (ref 0.5–1.9)
LACTIC ACID, SEPSIS: 1.6 MMOL/L (ref 0.5–1.9)
LACTIC ACID, SEPSIS: 3.3 MMOL/L (ref 0.5–1.9)
LEUKOCYTE ESTERASE, URINE: ABNORMAL
LYMPHOCYTES # BLD: 2 % (ref 24–44)
MAGNESIUM: 1.4 MG/DL (ref 1.6–2.6)
MCH RBC QN AUTO: 29.8 PG (ref 26–34)
MCHC RBC AUTO-ENTMCNC: 32.4 G/DL (ref 31–37)
MCV RBC AUTO: 92 FL (ref 80–100)
MONOCYTES # BLD: 0 % (ref 1–7)
MORPHOLOGY: NORMAL
MUCUS: ABNORMAL
MYOGLOBIN: 91 NG/ML (ref 25–58)
NITRITE, URINE: POSITIVE
NRBC AUTOMATED: ABNORMAL PER 100 WBC
OTHER OBSERVATIONS UA: ABNORMAL
PDW BLD-RTO: 13.8 % (ref 12.5–15.4)
PH UA: 5.5 (ref 5–8)
PLATELET # BLD: 142 K/UL (ref 140–450)
PLATELET ESTIMATE: ABNORMAL
PMV BLD AUTO: 9.7 FL (ref 6–12)
POTASSIUM SERPL-SCNC: 3.4 MMOL/L (ref 3.7–5.3)
POTASSIUM SERPL-SCNC: 4.4 MMOL/L (ref 3.7–5.3)
PRO-BNP: 2308 PG/ML
PROCALCITONIN: 38.02 NG/ML
PROTEIN UA: ABNORMAL
RBC # BLD: 3.58 M/UL (ref 4–5.2)
RBC # BLD: ABNORMAL 10*6/UL
RBC UA: ABNORMAL /HPF (ref 0–2)
REASON FOR REJECTION: NORMAL
RENAL EPITHELIAL, UA: ABNORMAL /HPF
SARS-COV-2, RAPID: NOT DETECTED
SARS-COV-2: NORMAL
SARS-COV-2: NORMAL
SEG NEUTROPHILS: 97 % (ref 36–66)
SEGMENTED NEUTROPHILS ABSOLUTE COUNT: 4.36 K/UL (ref 1.8–7.7)
SODIUM BLD-SCNC: 136 MMOL/L (ref 135–144)
SODIUM BLD-SCNC: 137 MMOL/L (ref 135–144)
SODIUM,UR: 47 MMOL/L
SOURCE: NORMAL
SPECIFIC GRAVITY UA: 1.01 (ref 1–1.03)
TRICHOMONAS: ABNORMAL
TROPONIN INTERP: ABNORMAL
TROPONIN T: ABNORMAL NG/ML
TROPONIN, HIGH SENSITIVITY: 32 NG/L (ref 0–14)
TROPONIN, HIGH SENSITIVITY: 45 NG/L (ref 0–14)
TROPONIN, HIGH SENSITIVITY: 45 NG/L (ref 0–14)
TURBIDITY: ABNORMAL
URINE HGB: ABNORMAL
UROBILINOGEN, URINE: ABNORMAL
WBC # BLD: 4.5 K/UL (ref 3.5–11)
WBC # BLD: ABNORMAL 10*3/UL
WBC UA: ABNORMAL /HPF (ref 0–5)
YEAST: ABNORMAL
ZZ NTE CLEAN UP: ORDERED TEST: NORMAL
ZZ NTE WITH NAME CLEAN UP: SPECIMEN SOURCE: NORMAL

## 2021-01-28 PROCEDURE — 96374 THER/PROPH/DIAG INJ IV PUSH: CPT

## 2021-01-28 PROCEDURE — 2580000003 HC RX 258: Performed by: NURSE PRACTITIONER

## 2021-01-28 PROCEDURE — 36415 COLL VENOUS BLD VENIPUNCTURE: CPT

## 2021-01-28 PROCEDURE — 85025 COMPLETE CBC W/AUTO DIFF WBC: CPT

## 2021-01-28 PROCEDURE — 82570 ASSAY OF URINE CREATININE: CPT

## 2021-01-28 PROCEDURE — 87088 URINE BACTERIA CULTURE: CPT

## 2021-01-28 PROCEDURE — 87086 URINE CULTURE/COLONY COUNT: CPT

## 2021-01-28 PROCEDURE — 51798 US URINE CAPACITY MEASURE: CPT

## 2021-01-28 PROCEDURE — 83735 ASSAY OF MAGNESIUM: CPT

## 2021-01-28 PROCEDURE — 84300 ASSAY OF URINE SODIUM: CPT

## 2021-01-28 PROCEDURE — 99285 EMERGENCY DEPT VISIT HI MDM: CPT

## 2021-01-28 PROCEDURE — 87205 SMEAR GRAM STAIN: CPT

## 2021-01-28 PROCEDURE — 87077 CULTURE AEROBIC IDENTIFY: CPT

## 2021-01-28 PROCEDURE — 71045 X-RAY EXAM CHEST 1 VIEW: CPT

## 2021-01-28 PROCEDURE — 83874 ASSAY OF MYOGLOBIN: CPT

## 2021-01-28 PROCEDURE — 83605 ASSAY OF LACTIC ACID: CPT

## 2021-01-28 PROCEDURE — 84156 ASSAY OF PROTEIN URINE: CPT

## 2021-01-28 PROCEDURE — 6370000000 HC RX 637 (ALT 250 FOR IP): Performed by: EMERGENCY MEDICINE

## 2021-01-28 PROCEDURE — 83880 ASSAY OF NATRIURETIC PEPTIDE: CPT

## 2021-01-28 PROCEDURE — 2580000003 HC RX 258: Performed by: EMERGENCY MEDICINE

## 2021-01-28 PROCEDURE — 94760 N-INVAS EAR/PLS OXIMETRY 1: CPT

## 2021-01-28 PROCEDURE — 84145 PROCALCITONIN (PCT): CPT

## 2021-01-28 PROCEDURE — 87040 BLOOD CULTURE FOR BACTERIA: CPT

## 2021-01-28 PROCEDURE — 1210000000 HC MED SURG R&B

## 2021-01-28 PROCEDURE — 6360000002 HC RX W HCPCS: Performed by: NURSE PRACTITIONER

## 2021-01-28 PROCEDURE — 6360000002 HC RX W HCPCS: Performed by: EMERGENCY MEDICINE

## 2021-01-28 PROCEDURE — 99223 1ST HOSP IP/OBS HIGH 75: CPT | Performed by: NURSE PRACTITIONER

## 2021-01-28 PROCEDURE — 96361 HYDRATE IV INFUSION ADD-ON: CPT

## 2021-01-28 PROCEDURE — 76770 US EXAM ABDO BACK WALL COMP: CPT

## 2021-01-28 PROCEDURE — 80048 BASIC METABOLIC PNL TOTAL CA: CPT

## 2021-01-28 PROCEDURE — U0002 COVID-19 LAB TEST NON-CDC: HCPCS

## 2021-01-28 PROCEDURE — 87186 SC STD MICRODIL/AGAR DIL: CPT

## 2021-01-28 PROCEDURE — 81001 URINALYSIS AUTO W/SCOPE: CPT

## 2021-01-28 PROCEDURE — 6370000000 HC RX 637 (ALT 250 FOR IP): Performed by: NURSE PRACTITIONER

## 2021-01-28 PROCEDURE — 93005 ELECTROCARDIOGRAM TRACING: CPT | Performed by: EMERGENCY MEDICINE

## 2021-01-28 PROCEDURE — 84484 ASSAY OF TROPONIN QUANT: CPT

## 2021-01-28 RX ORDER — ONDANSETRON 2 MG/ML
4 INJECTION INTRAMUSCULAR; INTRAVENOUS ONCE
Status: COMPLETED | OUTPATIENT
Start: 2021-01-28 | End: 2021-01-28

## 2021-01-28 RX ORDER — ACETAMINOPHEN 650 MG/1
650 SUPPOSITORY RECTAL EVERY 6 HOURS PRN
Status: DISCONTINUED | OUTPATIENT
Start: 2021-01-28 | End: 2021-02-03

## 2021-01-28 RX ORDER — TRAZODONE HYDROCHLORIDE 50 MG/1
50 TABLET ORAL NIGHTLY PRN
COMMUNITY
End: 2022-08-17

## 2021-01-28 RX ORDER — HEPARIN SODIUM 5000 [USP'U]/ML
5000 INJECTION, SOLUTION INTRAVENOUS; SUBCUTANEOUS EVERY 8 HOURS SCHEDULED
Status: DISCONTINUED | OUTPATIENT
Start: 2021-01-28 | End: 2021-02-03 | Stop reason: HOSPADM

## 2021-01-28 RX ORDER — POTASSIUM CHLORIDE 7.45 MG/ML
10 INJECTION INTRAVENOUS PRN
Status: DISCONTINUED | OUTPATIENT
Start: 2021-01-28 | End: 2021-02-03 | Stop reason: HOSPADM

## 2021-01-28 RX ORDER — SODIUM CHLORIDE 0.9 % (FLUSH) 0.9 %
10 SYRINGE (ML) INJECTION EVERY 12 HOURS SCHEDULED
Status: DISCONTINUED | OUTPATIENT
Start: 2021-01-28 | End: 2021-02-03 | Stop reason: HOSPADM

## 2021-01-28 RX ORDER — 0.9 % SODIUM CHLORIDE 0.9 %
500 INTRAVENOUS SOLUTION INTRAVENOUS ONCE
Status: COMPLETED | OUTPATIENT
Start: 2021-01-28 | End: 2021-01-28

## 2021-01-28 RX ORDER — 0.9 % SODIUM CHLORIDE 0.9 %
1000 INTRAVENOUS SOLUTION INTRAVENOUS ONCE
Status: COMPLETED | OUTPATIENT
Start: 2021-01-28 | End: 2021-01-28

## 2021-01-28 RX ORDER — POLYETHYLENE GLYCOL 3350 17 G/17G
17 POWDER, FOR SOLUTION ORAL DAILY PRN
Status: DISCONTINUED | OUTPATIENT
Start: 2021-01-28 | End: 2021-02-03 | Stop reason: HOSPADM

## 2021-01-28 RX ORDER — ONDANSETRON 4 MG/1
4 TABLET, ORALLY DISINTEGRATING ORAL EVERY 8 HOURS PRN
Status: DISCONTINUED | OUTPATIENT
Start: 2021-01-28 | End: 2021-02-03 | Stop reason: HOSPADM

## 2021-01-28 RX ORDER — ACETAMINOPHEN 325 MG/1
650 TABLET ORAL EVERY 6 HOURS PRN
Status: DISCONTINUED | OUTPATIENT
Start: 2021-01-28 | End: 2021-02-03

## 2021-01-28 RX ORDER — SODIUM CHLORIDE 0.9 % (FLUSH) 0.9 %
10 SYRINGE (ML) INJECTION PRN
Status: DISCONTINUED | OUTPATIENT
Start: 2021-01-28 | End: 2021-02-03 | Stop reason: HOSPADM

## 2021-01-28 RX ORDER — MAGNESIUM SULFATE 1 G/100ML
1000 INJECTION INTRAVENOUS PRN
Status: DISCONTINUED | OUTPATIENT
Start: 2021-01-28 | End: 2021-02-03 | Stop reason: HOSPADM

## 2021-01-28 RX ORDER — POTASSIUM CHLORIDE 20 MEQ/1
40 TABLET, EXTENDED RELEASE ORAL PRN
Status: DISCONTINUED | OUTPATIENT
Start: 2021-01-28 | End: 2021-02-03 | Stop reason: HOSPADM

## 2021-01-28 RX ORDER — ASPIRIN 81 MG/1
81 TABLET, CHEWABLE ORAL DAILY
Status: DISCONTINUED | OUTPATIENT
Start: 2021-01-28 | End: 2021-02-03 | Stop reason: HOSPADM

## 2021-01-28 RX ORDER — ATORVASTATIN CALCIUM 10 MG/1
10 TABLET, FILM COATED ORAL DAILY
Status: DISCONTINUED | OUTPATIENT
Start: 2021-01-28 | End: 2021-02-03 | Stop reason: HOSPADM

## 2021-01-28 RX ORDER — ACETAMINOPHEN 500 MG
1000 TABLET ORAL ONCE
Status: COMPLETED | OUTPATIENT
Start: 2021-01-28 | End: 2021-01-28

## 2021-01-28 RX ORDER — ONDANSETRON 2 MG/ML
4 INJECTION INTRAMUSCULAR; INTRAVENOUS EVERY 6 HOURS PRN
Status: DISCONTINUED | OUTPATIENT
Start: 2021-01-28 | End: 2021-02-03 | Stop reason: HOSPADM

## 2021-01-28 RX ORDER — SODIUM CHLORIDE 9 MG/ML
INJECTION, SOLUTION INTRAVENOUS CONTINUOUS
Status: DISCONTINUED | OUTPATIENT
Start: 2021-01-28 | End: 2021-02-01

## 2021-01-28 RX ORDER — PANTOPRAZOLE SODIUM 40 MG/1
40 TABLET, DELAYED RELEASE ORAL DAILY
Status: DISCONTINUED | OUTPATIENT
Start: 2021-01-28 | End: 2021-02-03 | Stop reason: HOSPADM

## 2021-01-28 RX ADMIN — ATORVASTATIN CALCIUM 10 MG: 10 TABLET, FILM COATED ORAL at 14:24

## 2021-01-28 RX ADMIN — PANTOPRAZOLE SODIUM 40 MG: 40 TABLET, DELAYED RELEASE ORAL at 14:25

## 2021-01-28 RX ADMIN — SODIUM CHLORIDE: 9 INJECTION, SOLUTION INTRAVENOUS at 16:32

## 2021-01-28 RX ADMIN — CEFTRIAXONE SODIUM 1000 MG: 1 INJECTION, POWDER, FOR SOLUTION INTRAMUSCULAR; INTRAVENOUS at 10:29

## 2021-01-28 RX ADMIN — HEPARIN SODIUM 5000 UNITS: 5000 INJECTION INTRAVENOUS; SUBCUTANEOUS at 14:50

## 2021-01-28 RX ADMIN — POTASSIUM CHLORIDE 40 MEQ: 1500 TABLET, EXTENDED RELEASE ORAL at 15:51

## 2021-01-28 RX ADMIN — MAGNESIUM SULFATE HEPTAHYDRATE 1000 MG: 1 INJECTION, SOLUTION INTRAVENOUS at 16:35

## 2021-01-28 RX ADMIN — SODIUM CHLORIDE 1000 ML: 9 INJECTION, SOLUTION INTRAVENOUS at 09:26

## 2021-01-28 RX ADMIN — MAGNESIUM SULFATE HEPTAHYDRATE 1000 MG: 1 INJECTION, SOLUTION INTRAVENOUS at 18:36

## 2021-01-28 RX ADMIN — ONDANSETRON 4 MG: 2 INJECTION INTRAMUSCULAR; INTRAVENOUS at 08:27

## 2021-01-28 RX ADMIN — ACETAMINOPHEN 1000 MG: 500 TABLET ORAL at 08:29

## 2021-01-28 RX ADMIN — HEPARIN SODIUM 5000 UNITS: 5000 INJECTION INTRAVENOUS; SUBCUTANEOUS at 20:18

## 2021-01-28 RX ADMIN — SODIUM CHLORIDE: 9 INJECTION, SOLUTION INTRAVENOUS at 13:00

## 2021-01-28 RX ADMIN — SODIUM CHLORIDE 1000 ML: 9 INJECTION, SOLUTION INTRAVENOUS at 08:26

## 2021-01-28 RX ADMIN — ACETAMINOPHEN 650 MG: 325 TABLET ORAL at 20:18

## 2021-01-28 RX ADMIN — SODIUM CHLORIDE 500 ML: 9 INJECTION, SOLUTION INTRAVENOUS at 13:56

## 2021-01-28 ASSESSMENT — PAIN DESCRIPTION - LOCATION
LOCATION: ARM
LOCATION: ARM
LOCATION: BACK

## 2021-01-28 ASSESSMENT — PAIN DESCRIPTION - PAIN TYPE
TYPE: ACUTE PAIN

## 2021-01-28 ASSESSMENT — PAIN SCALES - GENERAL: PAINLEVEL_OUTOF10: 3

## 2021-01-28 ASSESSMENT — PAIN DESCRIPTION - FREQUENCY: FREQUENCY: INTERMITTENT

## 2021-01-28 ASSESSMENT — ENCOUNTER SYMPTOMS
BACK PAIN: 1
COUGH: 1
STRIDOR: 0
SHORTNESS OF BREATH: 0
BLOOD IN STOOL: 0
ABDOMINAL PAIN: 0
NAUSEA: 0
VOMITING: 0
WHEEZING: 0
CONSTIPATION: 0
DIARRHEA: 0

## 2021-01-28 ASSESSMENT — PAIN DESCRIPTION - PROGRESSION
CLINICAL_PROGRESSION: NOT CHANGED
CLINICAL_PROGRESSION: NOT CHANGED

## 2021-01-28 ASSESSMENT — PAIN - FUNCTIONAL ASSESSMENT: PAIN_FUNCTIONAL_ASSESSMENT: ACTIVITIES ARE NOT PREVENTED

## 2021-01-28 ASSESSMENT — PAIN DESCRIPTION - DESCRIPTORS
DESCRIPTORS: ACHING;DULL
DESCRIPTORS: ACHING;DULL

## 2021-01-28 ASSESSMENT — PAIN DESCRIPTION - ONSET: ONSET: GRADUAL

## 2021-01-28 ASSESSMENT — PAIN DESCRIPTION - ORIENTATION: ORIENTATION: RIGHT

## 2021-01-28 NOTE — FLOWSHEET NOTE
01/28/21 1802   Urine Assessment   $ Bladder scan $ Yes   Post Void Cath Residual (mL) 5   Unmeasured Output   Urine Occurrence 1  (missed hat completely)     Patient voided a moderate amount, but missed hat.  PVR 5 mL per orders; continue to monitor

## 2021-01-28 NOTE — H&P
[facility]  Office: [address], DO, Jorgensenmayra Pastor, DO, Sue Casillas, DO, [facility], DO, Mehran Hooker MD, Roxy Matute MD, Niko Hansen MD, Stephen Balderas MD, Maximus Crum MD, Katina Hooker MD, Raven Bazan MD, Patricio Bumpers, MD, Artemio Loomis MD, Kezia Voss DO, Shelby Lorenzo MD, India Pelletier MD, Mason Liu DO, Mei Mcginnis MD,  Florin Jaquez, DO, Eddie Senior MD, Carolyn Cleaning MD, Nadine Churchill, [facility], [facility], CNP, Kat Gary, CNP, Kamaljit Crouch, CNS, Yusuf Osborne, CNP, Erendira Martines, CNP, Taina Vargas, CNP, Tiffany Adler, CNP, Gautam Bowden, CNP, Irina Mckee PA-C, Marcello Kayser, [facility], Cam Ramos, CNP, Denver Macadam, [facility], Tonya Delgado, [facility], Jose Angel Castañeda, CNP, Alan Murray, [facility]   [address] [facility]    HISTORY AND PHYSICAL EXAMINATION            Date:   1/28/2021  Patient name:  Celeste Councilman  Date of admission:  1/28/2021  7:45 AM  MRN:   [id number]  Account:  [de-identified]  YOB: 1940  PCP:    Vicky Mcqueen MD  Room:   [address] [place] [address]  Code Status:    Full Code    Chief Complaint:     Chief Complaint   Patient presents with    Fatigue     feeling run down x 3 days.  Cough     clear sputum with cough and nasal congestion    Arm Pain     left arm pain        History Obtained From:     patient, electronic medical record    History of Present Illness:     Celeste Councilman is a 79 y.o. Non-/non  female who presents with Fatigue (feeling run down x 3 days.), Cough (clear sputum with cough and nasal congestion), and Arm Pain (left arm pain )   and is admitted to the hospital for the management of Acute cystitis with hematuria. According to the ER note the patient presented with complaints of shakiness, fatigue x3 days, nausea and a cough with clear sputum production.   She denied chest pain or shortness of breath but apparently did complain of left Yes Historical Provider, MD   lovastatin (MEVACOR) 20 MG tablet Take 20 mg by mouth daily   Yes Historical Provider, MD   estradiol (ESTRACE) 0.5 MG tablet Take 0.25 mg by mouth daily   Yes Historical Provider, MD   Omega-3 Fatty Acids (FISH OIL PO) Take by mouth   Yes Historical Provider, MD   Cholecalciferol (VITAMIN D3) 50 MCG (2000 UT) CAPS Take by mouth   Yes Historical Provider, MD   pantoprazole (PROTONIX) 40 MG tablet Take 1 tablet by mouth daily 1/9/20   Sandy Lara DO   Misc Natural Products (GLUCOSAMINE-CHONDROITIN PLUS PO) Take by mouth    Historical Provider, MD        Allergies:     Morphine, Doxycycline, Azithromycin, and Ciprofloxacin    Social History:     Tobacco:    reports that she has quit smoking. She has never used smokeless tobacco.  Alcohol:      reports previous alcohol use. Drug Use:  reports no history of drug use. Family History:     Family History   Problem Relation Age of Onset    Heart Disease Mother 78    Alzheimer's Disease Father 67    Heart Attack Brother 62    Heart Disease Brother     Heart Disease Brother        Review of Systems:     Positive and Negative as described in HPI. Review of Systems   Constitutional: Positive for chills. Negative for diaphoresis and fever. HENT: Negative for congestion and hearing loss. Respiratory: Positive for cough (just yesterday with clear sputum). Negative for shortness of breath, wheezing and stridor. Cardiovascular: Negative. Negative for chest pain, palpitations and leg swelling. Gastrointestinal: Negative for abdominal pain, blood in stool, constipation, diarrhea, nausea and vomiting. Genitourinary: Positive for dysuria, frequency and urgency. Musculoskeletal: Positive for back pain. Negative for myalgias. Skin: Negative for rash. Neurological: Negative for dizziness, seizures and headaches. Psychiatric/Behavioral: The patient is not nervous/anxious.         Physical Exam:   BP (!) 87/45   Pulse 79 Temp 98 °F (36.7 °C) (Oral)   Resp 20   Ht 5' 1\" (1.549 m)   Wt 118 lb (53.5 kg)   SpO2 91%   BMI 22.30 kg/m²   Temp (24hrs), Av.7 °F (37.6 °C), Min:98 °F (36.7 °C), Max:103.1 °F (39.5 °C)    No results for input(s): POCGLU in the last 72 hours. Intake/Output Summary (Last 24 hours) at 2021 1431  Last data filed at 2021 0925  Gross per 24 hour   Intake 1000 ml   Output --   Net 1000 ml       Physical Exam  Vitals signs and nursing note reviewed. HENT:      Mouth/Throat:      Mouth: Mucous membranes are dry. Eyes:      Extraocular Movements: Extraocular movements intact. Cardiovascular:      Rate and Rhythm: Normal rate and regular rhythm. Heart sounds: Murmur present. Abdominal:      Tenderness: There is right CVA tenderness (mild) and left CVA tenderness (mild). Musculoskeletal: Normal range of motion. Skin:     General: Skin is warm and dry. Capillary Refill: Capillary refill takes less than 2 seconds. Neurological:      Mental Status: She is alert. GCS: GCS eye subscore is 4. GCS verbal subscore is 5. GCS motor subscore is 6. Sensory: Sensation is intact. Motor: Motor function is intact. Coordination: Coordination is intact. Comments: Patient is appropriate during conversation. She was seen by Dr. Nestor Quintana on 2021 and does not remember that at all. When I asked her about her home medications specifically, her sectral, she did not remember taking it and then said maybe she did take it a while ago.   According to the pharmacy the medication was prescribed and filled on 2021   Psychiatric:         Mood and Affect: Mood normal.         Investigations:      Laboratory Testing:  Recent Results (from the past 24 hour(s))   EKG 12 Lead    Collection Time: 21  8:02 AM   Result Value Ref Range    Ventricular Rate 109 BPM    Atrial Rate 109 BPM    P-R Interval 170 ms    QRS Duration 108 ms    Q-T Interval 356 ms    QTc Calculation (Sandi) 479 ms    P Axis -15 degrees    R Axis -43 degrees    T Axis 109 degrees   CBC Auto Differential    Collection Time: 01/28/21  8:36 AM   Result Value Ref Range    WBC 4.5 3.5 - 11.0 k/uL    RBC 3.58 (L) 4.0 - 5.2 m/uL    Hemoglobin 10.7 (L) 12.0 - 16.0 g/dL    Hematocrit 33.0 (L) 36 - 46 %    MCV 92.0 80 - 100 fL    MCH 29.8 26 - 34 pg    MCHC 32.4 31 - 37 g/dL    RDW 13.8 12.5 - 15.4 %    Platelets 403 068 - 132 k/uL    MPV 9.7 6.0 - 12.0 fL    NRBC Automated NOT REPORTED per 100 WBC    Differential Type NOT REPORTED     Immature Granulocytes NOT REPORTED 0 %    Absolute Immature Granulocyte NOT REPORTED 0.00 - 0.30 k/uL    WBC Morphology NOT REPORTED     RBC Morphology NOT REPORTED     Platelet Estimate NOT REPORTED     Seg Neutrophils 97 (H) 36 - 66 %    Lymphocytes 2 (L) 24 - 44 %    Monocytes 0 (L) 1 - 7 %    Eosinophils % 1 1 - 4 %    Basophils 0 0 - 2 %    Segs Absolute 4.36 1.8 - 7.7 k/uL    Absolute Lymph # 0.09 (L) 1.0 - 4.8 k/uL    Absolute Mono # 0.00 (L) 0.1 - 0.8 k/uL    Absolute Eos # 0.05 0.0 - 0.4 k/uL    Basophils Absolute 0.00 0.0 - 0.2 k/uL    Morphology Normal    Basic Metabolic Panel    Collection Time: 01/28/21  8:36 AM   Result Value Ref Range    Glucose 94 70 - 99 mg/dL    BUN 22 8 - 23 mg/dL    CREATININE 2.15 (H) 0.50 - 0.90 mg/dL    Bun/Cre Ratio NOT REPORTED 9 - 20    Calcium 8.0 (L) 8.6 - 10.4 mg/dL    Sodium 136 135 - 144 mmol/L    Potassium 3.4 (L) 3.7 - 5.3 mmol/L    Chloride 103 98 - 107 mmol/L    CO2 22 20 - 31 mmol/L    Anion Gap 11 9 - 17 mmol/L    GFR Non-African American 22 (L) >60 mL/min    GFR  27 (L) >60 mL/min    GFR Comment          GFR Staging NOT REPORTED    Troponin    Collection Time: 01/28/21  8:36 AM   Result Value Ref Range    Troponin, High Sensitivity 32 (H) 0 - 14 ng/L    Troponin T NOT REPORTED <0.03 ng/mL    Troponin Interp NOT REPORTED    Lactate, Sepsis    Collection Time: 01/28/21  8:36 AM   Result Value Ref Range Lactate, Sepsis    Collection Time: 01/28/21 10:39 AM   Result Value Ref Range    Lactic Acid, Sepsis 1.6 0.5 - 1.9 mmol/L    Lactic Acid, Sepsis, Whole Blood NOT REPORTED 0.5 - 1.9 mmol/L   Troponin    Collection Time: 01/28/21 10:39 AM   Result Value Ref Range    Troponin, High Sensitivity 45 (H) 0 - 14 ng/L    Troponin T NOT REPORTED <0.03 ng/mL    Troponin Interp NOT REPORTED    Magnesium    Collection Time: 01/28/21 10:39 AM   Result Value Ref Range    Magnesium 1.4 (L) 1.6 - 2.6 mg/dL       Imaging/Diagnostics:    Xr Chest Portable    Result Date: 1/28/2021  No acute cardiopulmonary pathology. US RETROPERITONEAL COMPLETE Updated: 01/28/21 1316  Narrative:   EXAMINATION:   ULTRASOUND OF THE RETROPERITONEUM     1/28/2021 12:24 pm     COMPARISON:   None. FINDINGS:   Right kidney measures 10.6 cm. Left kidney measures 10.8 cm. No hydronephrosis. No renal lesion. Normal renal echogenicity. Urinary bladder is decompressed and not evaluated. Impression:   Normal sonographic appearance of the kidneys.          Assessment :      Hospital Problems           Last Modified POA    * (Principal) Acute cystitis with hematuria 1/28/2021 Yes    Hyperlipidemia 1/28/2021 Yes    Cardiomegaly 1/28/2021 Yes    Dementia ([address] 1/28/2021 Yes    Gastroesophageal reflux disease 1/28/2021 Yes    Essential hypertension 1/28/2021 Yes    History of ventricular fibrillation 1/28/2021 Yes    History of recurrent UTIs (Chronic) 1/28/2021 Yes    Elevated brain natriuretic peptide (BNP) level 1/28/2021 Yes    Sepsis ([address] 1/28/2021 Yes    Hypomagnesemia 1/28/2021 Yes    Hypokalemia 1/28/2021 Yes    Dehydration 1/28/2021 Yes    MARCOS (acute kidney injury) ([address] 1/28/2021 Yes    Elevated troponin 1/28/2021 Yes          Plan:     Patient status inpatient in the Progressive Unit/Step down    Sepsis related to acute cystitis with hematuria; IV hydration, Rocephin 1 g IV daily pending urine culture and blood culture results. Additional 500 mL fluid bolus for continued hypotension. Monitor I&O. Hold Sectral.  Check postvoid residual x1    MARCOS related to dehydration related to acute cystitis; continue IV hydration. Repeat BMP in a.m. Renal ultrasound completed and unremarkable    Elevated troponin/BNP history of ventricular fibrillation; troponins trending up. Cardiology consulted. Repeat troponin now. Echo in the morning related to elevated BNP/murmur. Resume home baby aspirin    Hypomagnesium/hypokalemia; replace and monitor electrolytes    Resume home Protonix related to GERD    Resume home lovastatin related to hyperlipidemia    Urine creatinine/sodium      Consultations:   IP CONSULT TO CARDIOLOGY     Patient is admitted as inpatient status because of co-morbidities listed above, severity of signs and symptoms as outlined, requirement for current medical therapies and most importantly because of direct risk to patient if care not provided in a hospital setting. Expected length of stay > 48 hours.     RIC Choudhury - CNP  1/28/2021  2:31 PM    Copy sent to Dr. Michelet Valdez MD

## 2021-01-28 NOTE — ED NOTES
O2 applied NC at 2L for saturation level of [address] [facility], [address] [place] [address]  01/28/21 5392

## 2021-01-28 NOTE — ED NOTES
Dr Beatriz Wills at bedside to evaluate and update with results and plan of care     Kendra Mauro RN  01/28/21 0142

## 2021-01-28 NOTE — ED NOTES
Bib Martinez, NP internal medicine returned page and spoke with Dr Sukhi Salas for admission     Cierra Elliott RN  01/28/21 6800

## 2021-01-28 NOTE — Clinical Note
Patient Class: Inpatient [101]   REQUIRED: Diagnosis: Pyelonephritis [474075]   Estimated Length of Stay: Estimated stay of more than 2 midnights   Admitting Provider: Shayla Valdes [[id number]]   Telemetry Bed Required?: Yes

## 2021-01-28 NOTE — PROGRESS NOTES
Rn informed the RT that 02 had to be resumed as pt had dropped below 90% . 2lpm was initiated .  The sat now reading 92%

## 2021-01-28 NOTE — ED PROVIDER NOTES
[address] [facility]  [address] [facility] [address]  Phone: [phone]  Fax: Dot Whatley 2006      Pt Name: Angelito Mckeon  MRN: [id number]  [place] 1940  Date of evaluation: 1/28/2021    CHIEF COMPLAINT       Chief Complaint   Patient presents with    Fatigue     feeling run down x 3 days.  Cough     clear sputum with cough and nasal congestion    Arm Pain     left arm pain        HISTORY OF PRESENT ILLNESS    Angelito Mckeon is a 79 y.o. female who presents to the emergency department complaining of shakiness that started last night with a clear cough. No chest pain or shortness of breath she admits to some nasal congestion. Complaining of fatigue for the past 3 days. Presents by ambulance febrile with a temperature of one oh three. Tachycardic and hypotensive. She denies any chest pain. No abdominal pain. She admits to some nausea no vomiting. No other symptoms. She has not received a Covid vaccine. REVIEW OF SYSTEMS       Constitutional: Positive fevers and chills  HEENT: No sore throat, positive rhinorrhea  Eyes: No blurry vision or double vision no drainage   Cardiovascular: No chest pain or tachycardia   Respiratory: No wheezing or shortness of breath positive cough  Gastrointestinal: No nausea, vomiting, diarrhea, constipation, or abdominal pain   : No hematuria or dysuria   Musculoskeletal: No swelling or pain   Skin: No rash   Neurological: No focal neurologic complaints, paresthesias, weakness, or headache     PAST MEDICAL HISTORY    has a past medical history of Accelerated hypertension, Atypical chest pain, Hyperlipidemia, Hypertension, and Labile blood pressure. SURGICAL HISTORY      has a past surgical history that includes Hysterectomy; Abdomen surgery; LIGATION OF SAPHENOUS VEIN; and bladder suspension.     CURRENT MEDICATIONS       Previous Medications    ACEBUTOLOL (SECTRAL) 400 MG CAPSULE    Take 400 mg by mouth daily ASPIRIN 81 MG CHEWABLE TABLET    Take 81 mg by mouth daily    CHOLECALCIFEROL (VITAMIN D3) 50 MCG (2000 UT) CAPS    Take by mouth    ESTRADIOL (ESTRACE) 0.5 MG TABLET    Take 0.25 mg by mouth daily    LOVASTATIN (MEVACOR) 20 MG TABLET    Take 20 mg by mouth daily    MISC NATURAL PRODUCTS (GLUCOSAMINE-CHONDROITIN PLUS PO)    Take by mouth    OMEGA-3 FATTY ACIDS (FISH OIL PO)    Take by mouth    PANTOPRAZOLE (PROTONIX) 40 MG TABLET    Take 1 tablet by mouth daily       ALLERGIES     is allergic to morphine; doxycycline; azithromycin; and ciprofloxacin. FAMILY HISTORY     She indicated that her mother is . She indicated that her father is . She indicated that two of her three brothers are alive. family history includes Alzheimer's Disease (age of onset: 67) in her father; Heart Attack (age of onset: 62) in her brother; Heart Disease in her brother and brother; Heart Disease (age of onset: 78) in her mother. SOCIAL HISTORY      reports that she has quit smoking. She has never used smokeless tobacco. She reports previous alcohol use. She reports that she does not use drugs. PHYSICAL EXAM       ED Triage Vitals [21 0747]   BP Temp Temp Source Pulse Resp SpO2 Height Weight   (!) 95/50 103.1 °F (39.5 °C) Oral 105 18 92 % 5' 1\" (1.549 m) 118 lb (53.5 kg)       Constitutional: Alert, oriented x3, nontoxic, answering questions appropriately, acting properly for age, in no acute distress febrile  HEENT: Extraocular muscles intact, mucus membranes dry   neck: Trachea midline no meningismus  Cardiovascular: Regular rhythm and tachycardic no S3, S4, or murmurs   Respiratory: Clear to auscultation bilaterally no wheezes, rhonchi, rales, no respiratory distress no tachypnea no retractions no hypoxia  Gastrointestinal: Soft, nontender, nondistended, positive bowel sounds. No rebound, rigidity, or guarding.    Musculoskeletal: No extremity pain or swelling no calf tenderness or asymmetry  Neurologic: Moving all 4 extremities without difficulty there are no gross focal neurologic deficits   Skin: Warm and dry     DIFFERENTIAL DIAGNOSIS/ MDM:     IV fluids and labs. Tylenol for fever Zofran for nausea. Rule out pneumonia versus other source of infection. Covid rule out. DIAGNOSTIC RESULTS     EKG: All EKG's are interpreted by the Emergency Department Physician who either signs or Co-signs this chart in the absence of a cardiologist.    Eight oh two sinus tachycardia rate 109    no acute ST or T wave changes.     Not indicated unless otherwise documented above    LABS:  Results for orders placed or performed during the hospital encounter of 01/28/21   CBC Auto Differential   Result Value Ref Range    WBC 4.5 3.5 - 11.0 k/uL    RBC 3.58 (L) 4.0 - 5.2 m/uL    Hemoglobin 10.7 (L) 12.0 - 16.0 g/dL    Hematocrit 33.0 (L) 36 - 46 %    MCV 92.0 80 - 100 fL    MCH 29.8 26 - 34 pg    MCHC 32.4 31 - 37 g/dL    RDW 13.8 12.5 - 15.4 %    Platelets 443 043 - 889 k/uL    MPV 9.7 6.0 - 12.0 fL    NRBC Automated NOT REPORTED per 100 WBC    Differential Type NOT REPORTED     Immature Granulocytes NOT REPORTED 0 %    Absolute Immature Granulocyte NOT REPORTED 0.00 - 0.30 k/uL    WBC Morphology NOT REPORTED     RBC Morphology NOT REPORTED     Platelet Estimate NOT REPORTED     Seg Neutrophils 97 (H) 36 - 66 %    Lymphocytes 2 (L) 24 - 44 %    Monocytes 0 (L) 1 - 7 %    Eosinophils % 1 1 - 4 %    Basophils 0 0 - 2 %    Segs Absolute 4.36 1.8 - 7.7 k/uL    Absolute Lymph # 0.09 (L) 1.0 - 4.8 k/uL    Absolute Mono # 0.00 (L) 0.1 - 0.8 k/uL    Absolute Eos # 0.05 0.0 - 0.4 k/uL    Basophils Absolute 0.00 0.0 - 0.2 k/uL    Morphology Normal    Basic Metabolic Panel   Result Value Ref Range    Glucose 94 70 - 99 mg/dL    BUN 22 8 - 23 mg/dL    CREATININE 2.15 (H) 0.50 - 0.90 mg/dL    Bun/Cre Ratio NOT REPORTED 9 - 20    Calcium 8.0 (L) 8.6 - 10.4 mg/dL    Sodium 136 135 - 144 mmol/L Potassium 3.4 (L) 3.7 - 5.3 mmol/L    Chloride 103 98 - 107 mmol/L    CO2 22 20 - 31 mmol/L    Anion Gap 11 9 - 17 mmol/L    GFR Non-African American 22 (L) >60 mL/min    GFR  27 (L) >60 mL/min    GFR Comment          GFR Staging NOT REPORTED    Troponin   Result Value Ref Range    Troponin, High Sensitivity 32 (H) 0 - 14 ng/L    Troponin T NOT REPORTED <0.03 ng/mL    Troponin Interp NOT REPORTED    Lactate, Sepsis   Result Value Ref Range    Lactic Acid, Sepsis 3.3 (H) 0.5 - 1.9 mmol/L    Lactic Acid, Sepsis, Whole Blood NOT REPORTED 0.5 - 1.9 mmol/L   Brain Natriuretic Peptide   Result Value Ref Range    Pro-BNP 2,308 (H) <300 pg/mL    BNP Interpretation Pro-BNP Reference Range:    COVID-19    Specimen: Other   Result Value Ref Range    SARS-CoV-2          SARS-CoV-2, Rapid Not Detected Not Detected    Source . NASOPHARYNGEAL SWAB     SARS-CoV-2         Urinalysis Reflex to Culture    Specimen: Urine, clean catch   Result Value Ref Range    Color, UA NAHID (A) YELLOW    Turbidity UA CLOUDY (A) CLEAR    Glucose, Ur TRACE (A) NEGATIVE    Bilirubin Urine NEGATIVE  Verified by ictotest. (A) NEGATIVE    Ketones, Urine NEGATIVE NEGATIVE    Specific Gravity, UA 1.010 1.005 - 1.030    Urine Hgb MODERATE (A) NEGATIVE    pH, UA 5.5 5.0 - 8.0    Protein, UA 2+ (A) NEGATIVE    Urobilinogen, Urine ELEVATED (A) Normal    Nitrite, Urine POSITIVE (A) NEGATIVE    Leukocyte Esterase, Urine LARGE (A) NEGATIVE    Urinalysis Comments       INTERPRET WITH CAUTION DUE TO INTENSE COLOR OF URINE.    Microscopic Urinalysis   Result Value Ref Range    -          WBC, UA 50  0 - 5 /HPF    RBC, UA 5 TO 10 0 - 2 /HPF    Casts UA NOT REPORTED /LPF    Crystals, UA NOT REPORTED None /HPF    Epithelial Cells UA 2 TO 5 0 - 5 /HPF    Renal Epithelial, UA NOT REPORTED 0 /HPF    Bacteria, UA MANY (A) None    Mucus, UA 1+ (A) None    Trichomonas, UA NOT REPORTED None    Amorphous, UA NOT REPORTED None    Other Observations UA Culture ordered based on defined criteria. (A) NOT REQ. Yeast, UA NOT REPORTED None       Not indicated unless otherwise documented above    RADIOLOGY:   I reviewed the radiologist interpretations:    XR CHEST PORTABLE   Final Result   No acute cardiopulmonary pathology. Not indicated unless otherwise documented above    EMERGENCY DEPARTMENT COURSE:     The patient was given the following medications:  Orders Placed This Encounter   Medications    0.9 % sodium chloride bolus    ondansetron (ZOFRAN) injection 4 mg    acetaminophen (TYLENOL) tablet 1,000 mg    0.9 % sodium chloride bolus    cefTRIAXone (ROCEPHIN) 1000 mg IVPB in 50 mL D5W minibag     Order Specific Question:   Antimicrobial Indications     Answer:   Urinary Tract Infection        Vitals:   -------------------------  BP (!) 82/49   Pulse 85   Temp 103.1 °F (39.5 °C) (Oral)   Resp 20   Ht 5' 1\" (1.549 m)   Wt 53.5 kg (118 lb)   SpO2 93%   BMI 22.30 kg/m²     9:20 AM resting comfortably no acute distress. Heart rate improved with some fluids blood pressure still remains below 450 systolic. Will give another fluid bolus. Borderline anemia. Normal white blood cell count. Creatinine significantly elevated compared to her normal suspect secondary to dehydration. Covid is negative awaiting chest x-ray. 10:15 AM positive UTI positive nitrates and large leukocyte esterase with  white blood cells. Heart rate again improved remains hypotensive continue IV fluids. She is in no acute distress. Will give IV Rocephin. Will admit. 10:30 AM discussed with Jude Barron who agrees to admission. I have reviewed the disposition diagnosis with the patient and or their family/guardian. I have answered their questions and given discharge instructions. They voiced understanding of these instructions and did not have any furtherquestions or complaints.     CRITICAL CARE:    None    CONSULTS:    None    PROCEDURES:    None      OARRS Report if indicated             FINAL IMPRESSION      1. Acute UTI    2. Septicemia ([facility] [address]          DISPOSITION/PLAN   DISPOSITION          CONDITION ON DISPOSITION: STABLE       PATIENT REFERRED TO:  No follow-up provider specified.     DISCHARGE MEDICATIONS:  New Prescriptions    No medications on file       (Please note that portions of thisnote were completed with a voice recognition program.  Efforts were made to edit the dictations but occasionally words are mis-transcribed.)    Julian DO  Attending Emergency Physician     Van White DO  01/28/21 1031

## 2021-01-28 NOTE — PROGRESS NOTES
RT in to perform 02 spot check , Fio2 noted at 3lpm  With sat 94% the patient feels it is bothering her and dos not  understand why she needs to wear .  02 removed and will monitor

## 2021-01-29 ENCOUNTER — APPOINTMENT (OUTPATIENT)
Dept: GENERAL RADIOLOGY | Age: 81
DRG: 871 | End: 2021-01-29
Payer: MEDICARE

## 2021-01-29 PROBLEM — R78.81 GRAM-NEGATIVE BACTEREMIA: Status: ACTIVE | Noted: 2021-01-29

## 2021-01-29 PROBLEM — N17.0 SEPSIS DUE TO ESCHERICHIA COLI WITH ACUTE RENAL FAILURE AND TUBULAR NECROSIS WITHOUT SEPTIC SHOCK (HCC): Status: ACTIVE | Noted: 2021-01-29

## 2021-01-29 PROBLEM — A41.51 SEPSIS DUE TO ESCHERICHIA COLI WITH ACUTE RENAL FAILURE AND TUBULAR NECROSIS WITHOUT SEPTIC SHOCK (HCC): Status: ACTIVE | Noted: 2021-01-29

## 2021-01-29 PROBLEM — R09.02 HYPOXIA: Status: ACTIVE | Noted: 2021-01-29

## 2021-01-29 PROBLEM — R65.20 SEPSIS DUE TO ESCHERICHIA COLI WITH ACUTE RENAL FAILURE AND TUBULAR NECROSIS WITHOUT SEPTIC SHOCK (HCC): Status: ACTIVE | Noted: 2021-01-29

## 2021-01-29 LAB
-: NORMAL
ABSOLUTE RETIC #: 0.05 M/UL (ref 0.03–0.08)
ADENOVIRUS PCR: NOT DETECTED
ANION GAP SERPL CALCULATED.3IONS-SCNC: 7 MMOL/L (ref 9–17)
BORDETELLA PARAPERTUSSIS: NOT DETECTED
BORDETELLA PERTUSSIS PCR: NOT DETECTED
BUN BLDV-MCNC: 24 MG/DL (ref 8–23)
BUN/CREAT BLD: ABNORMAL (ref 9–20)
CALCIUM SERPL-MCNC: 8 MG/DL (ref 8.6–10.4)
CHLAMYDIA PNEUMONIAE BY PCR: NOT DETECTED
CHLORIDE BLD-SCNC: 110 MMOL/L (ref 98–107)
CO2: 21 MMOL/L (ref 20–31)
COMPLEMENT C3: 108 MG/DL (ref 90–180)
COMPLEMENT C4: 21 MG/DL (ref 10–40)
CORONAVIRUS 229E PCR: NOT DETECTED
CORONAVIRUS HKU1 PCR: NOT DETECTED
CORONAVIRUS NL63 PCR: NOT DETECTED
CORONAVIRUS OC43 PCR: NOT DETECTED
CREAT SERPL-MCNC: 1.79 MG/DL (ref 0.5–0.9)
CREATININE URINE: 84.7 MG/DL (ref 28–217)
CULTURE: ABNORMAL
EKG ATRIAL RATE: 109 BPM
EKG P AXIS: -15 DEGREES
EKG P-R INTERVAL: 170 MS
EKG Q-T INTERVAL: 356 MS
EKG QRS DURATION: 108 MS
EKG QTC CALCULATION (BAZETT): 479 MS
EKG R AXIS: -43 DEGREES
EKG T AXIS: 109 DEGREES
EKG VENTRICULAR RATE: 109 BPM
FERRITIN: 302 UG/L (ref 13–150)
FOLATE: 5.6 NG/ML
GFR AFRICAN AMERICAN: 33 ML/MIN
GFR NON-AFRICAN AMERICAN: 27 ML/MIN
GFR SERPL CREATININE-BSD FRML MDRD: ABNORMAL ML/MIN/{1.73_M2}
GFR SERPL CREATININE-BSD FRML MDRD: ABNORMAL ML/MIN/{1.73_M2}
GLUCOSE BLD-MCNC: 91 MG/DL (ref 70–99)
HCT VFR BLD CALC: 32.1 % (ref 36–46)
HEMOGLOBIN: 10.3 G/DL (ref 12–16)
HUMAN METAPNEUMOVIRUS PCR: NOT DETECTED
IMMATURE RETIC FRACT: 10 % (ref 2.7–18.3)
INFLUENZA A BY PCR: NOT DETECTED
INFLUENZA A H1 (2009) PCR: NORMAL
INFLUENZA A H1 PCR: NORMAL
INFLUENZA A H3 PCR: NORMAL
INFLUENZA B BY PCR: NOT DETECTED
INR BLD: 1.1
IRON SATURATION: 9 % (ref 20–55)
IRON: 14 UG/DL (ref 37–145)
LV EF: 55 %
LVEF MODALITY: NORMAL
Lab: ABNORMAL
MAGNESIUM: 2.3 MG/DL (ref 1.6–2.6)
MCH RBC QN AUTO: 29.3 PG (ref 26–34)
MCHC RBC AUTO-ENTMCNC: 32 G/DL (ref 31–37)
MCV RBC AUTO: 91.8 FL (ref 80–100)
MYCOPLASMA PNEUMONIAE PCR: NOT DETECTED
NRBC AUTOMATED: ABNORMAL PER 100 WBC
PARAINFLUENZA 1 PCR: NOT DETECTED
PARAINFLUENZA 2 PCR: NOT DETECTED
PARAINFLUENZA 3 PCR: NOT DETECTED
PARAINFLUENZA 4 PCR: NOT DETECTED
PDW BLD-RTO: 14 % (ref 12.5–15.4)
PLATELET # BLD: 102 K/UL (ref 140–450)
PMV BLD AUTO: 9.8 FL (ref 6–12)
POTASSIUM SERPL-SCNC: 4.2 MMOL/L (ref 3.7–5.3)
PROTHROMBIN TIME: 10.9 SEC (ref 9.4–12.6)
RBC # BLD: 3.5 M/UL (ref 4–5.2)
REASON FOR REJECTION: NORMAL
RESP SYNCYTIAL VIRUS PCR: NOT DETECTED
RETIC %: 1.3 % (ref 0.5–1.9)
RETIC HEMOGLOBIN: 32.1 PG (ref 28.2–35.7)
RHINO/ENTEROVIRUS PCR: NOT DETECTED
SARS-COV-2, PCR: NOT DETECTED
SODIUM BLD-SCNC: 138 MMOL/L (ref 135–144)
SPECIMEN DESCRIPTION: ABNORMAL
SPECIMEN DESCRIPTION: NORMAL
TOTAL IRON BINDING CAPACITY: 159 UG/DL (ref 250–450)
TOTAL PROTEIN, URINE: 66 MG/DL
TROPONIN INTERP: ABNORMAL
TROPONIN T: ABNORMAL NG/ML
TROPONIN, HIGH SENSITIVITY: 21 NG/L (ref 0–14)
TROPONIN, HIGH SENSITIVITY: 25 NG/L (ref 0–14)
TROPONIN, HIGH SENSITIVITY: 26 NG/L (ref 0–14)
UNSATURATED IRON BINDING CAPACITY: 145 UG/DL (ref 112–347)
URINE TOTAL PROTEIN CREATININE RATIO: 0.78 (ref 0–0.2)
VITAMIN B-12: 548 PG/ML (ref 232–1245)
WBC # BLD: 14.5 K/UL (ref 3.5–11)
ZZ NTE CLEAN UP: ORDERED TEST: NORMAL
ZZ NTE WITH NAME CLEAN UP: SPECIMEN SOURCE: NORMAL

## 2021-01-29 PROCEDURE — 82728 ASSAY OF FERRITIN: CPT

## 2021-01-29 PROCEDURE — 85045 AUTOMATED RETICULOCYTE COUNT: CPT

## 2021-01-29 PROCEDURE — 83735 ASSAY OF MAGNESIUM: CPT

## 2021-01-29 PROCEDURE — 99222 1ST HOSP IP/OBS MODERATE 55: CPT | Performed by: INTERNAL MEDICINE

## 2021-01-29 PROCEDURE — 84155 ASSAY OF PROTEIN SERUM: CPT

## 2021-01-29 PROCEDURE — 6370000000 HC RX 637 (ALT 250 FOR IP): Performed by: NURSE PRACTITIONER

## 2021-01-29 PROCEDURE — 85027 COMPLETE CBC AUTOMATED: CPT

## 2021-01-29 PROCEDURE — 86038 ANTINUCLEAR ANTIBODIES: CPT

## 2021-01-29 PROCEDURE — 86160 COMPLEMENT ANTIGEN: CPT

## 2021-01-29 PROCEDURE — 6360000002 HC RX W HCPCS: Performed by: NURSE PRACTITIONER

## 2021-01-29 PROCEDURE — 84165 PROTEIN E-PHORESIS SERUM: CPT

## 2021-01-29 PROCEDURE — 71045 X-RAY EXAM CHEST 1 VIEW: CPT

## 2021-01-29 PROCEDURE — 83883 ASSAY NEPHELOMETRY NOT SPEC: CPT

## 2021-01-29 PROCEDURE — 80048 BASIC METABOLIC PNL TOTAL CA: CPT

## 2021-01-29 PROCEDURE — 83540 ASSAY OF IRON: CPT

## 2021-01-29 PROCEDURE — 84484 ASSAY OF TROPONIN QUANT: CPT

## 2021-01-29 PROCEDURE — 2580000003 HC RX 258: Performed by: NURSE PRACTITIONER

## 2021-01-29 PROCEDURE — 85610 PROTHROMBIN TIME: CPT

## 2021-01-29 PROCEDURE — 1210000000 HC MED SURG R&B

## 2021-01-29 PROCEDURE — 99232 SBSQ HOSP IP/OBS MODERATE 35: CPT | Performed by: INTERNAL MEDICINE

## 2021-01-29 PROCEDURE — 36415 COLL VENOUS BLD VENIPUNCTURE: CPT

## 2021-01-29 PROCEDURE — 83550 IRON BINDING TEST: CPT

## 2021-01-29 PROCEDURE — 0202U NFCT DS 22 TRGT SARS-COV-2: CPT

## 2021-01-29 PROCEDURE — 93306 TTE W/DOPPLER COMPLETE: CPT

## 2021-01-29 PROCEDURE — 82607 VITAMIN B-12: CPT

## 2021-01-29 PROCEDURE — 82746 ASSAY OF FOLIC ACID SERUM: CPT

## 2021-01-29 PROCEDURE — 86334 IMMUNOFIX E-PHORESIS SERUM: CPT

## 2021-01-29 PROCEDURE — 2700000000 HC OXYGEN THERAPY PER DAY

## 2021-01-29 RX ORDER — UREA 10 %
1.5 LOTION (ML) TOPICAL NIGHTLY PRN
Status: DISCONTINUED | OUTPATIENT
Start: 2021-01-29 | End: 2021-01-30

## 2021-01-29 RX ORDER — HYDROCODONE BITARTRATE AND ACETAMINOPHEN 5; 325 MG/1; MG/1
1 TABLET ORAL EVERY 4 HOURS PRN
Status: DISCONTINUED | OUTPATIENT
Start: 2021-01-29 | End: 2021-02-03 | Stop reason: HOSPADM

## 2021-01-29 RX ORDER — GUAIFENESIN DEXTROMETHORPHAN HYDROBROMIDE ORAL SOLUTION 10; 100 MG/5ML; MG/5ML
10 SOLUTION ORAL EVERY 4 HOURS PRN
Status: DISCONTINUED | OUTPATIENT
Start: 2021-01-29 | End: 2021-02-03 | Stop reason: HOSPADM

## 2021-01-29 RX ADMIN — ACETAMINOPHEN 650 MG: 325 TABLET ORAL at 04:17

## 2021-01-29 RX ADMIN — CEFTRIAXONE SODIUM 2000 MG: 2 INJECTION, POWDER, FOR SOLUTION INTRAMUSCULAR; INTRAVENOUS at 12:11

## 2021-01-29 RX ADMIN — HYDROCODONE BITARTRATE AND ACETAMINOPHEN 1 TABLET: 5; 325 TABLET ORAL at 16:47

## 2021-01-29 RX ADMIN — HEPARIN SODIUM 5000 UNITS: 5000 INJECTION INTRAVENOUS; SUBCUTANEOUS at 21:16

## 2021-01-29 RX ADMIN — HYDROCODONE BITARTRATE AND ACETAMINOPHEN 1 TABLET: 5; 325 TABLET ORAL at 21:16

## 2021-01-29 RX ADMIN — ONDANSETRON 4 MG: 2 INJECTION INTRAMUSCULAR; INTRAVENOUS at 17:12

## 2021-01-29 RX ADMIN — Medication 1.5 MG: at 21:15

## 2021-01-29 RX ADMIN — ASPIRIN 81 MG: 81 TABLET, CHEWABLE ORAL at 09:22

## 2021-01-29 RX ADMIN — HEPARIN SODIUM 5000 UNITS: 5000 INJECTION INTRAVENOUS; SUBCUTANEOUS at 05:58

## 2021-01-29 RX ADMIN — ATORVASTATIN CALCIUM 10 MG: 10 TABLET, FILM COATED ORAL at 09:22

## 2021-01-29 RX ADMIN — PANTOPRAZOLE SODIUM 40 MG: 40 TABLET, DELAYED RELEASE ORAL at 09:22

## 2021-01-29 RX ADMIN — ACETAMINOPHEN 650 MG: 325 TABLET ORAL at 12:11

## 2021-01-29 RX ADMIN — HEPARIN SODIUM 5000 UNITS: 5000 INJECTION INTRAVENOUS; SUBCUTANEOUS at 14:01

## 2021-01-29 RX ADMIN — DEXTROMETHORPHAN HYDROBROMIDE AND GUAIFENESIN 10 ML: 20; 200 LIQUID ORAL at 17:06

## 2021-01-29 ASSESSMENT — PAIN DESCRIPTION - PROGRESSION
CLINICAL_PROGRESSION: NOT CHANGED
CLINICAL_PROGRESSION: GRADUALLY IMPROVING
CLINICAL_PROGRESSION: NOT CHANGED
CLINICAL_PROGRESSION: GRADUALLY IMPROVING
CLINICAL_PROGRESSION: NOT CHANGED

## 2021-01-29 ASSESSMENT — PAIN DESCRIPTION - LOCATION
LOCATION: GENERALIZED
LOCATION: ABDOMEN
LOCATION: ABDOMEN

## 2021-01-29 ASSESSMENT — PAIN SCALES - GENERAL
PAINLEVEL_OUTOF10: 8
PAINLEVEL_OUTOF10: 4
PAINLEVEL_OUTOF10: 3
PAINLEVEL_OUTOF10: 6
PAINLEVEL_OUTOF10: 7
PAINLEVEL_OUTOF10: 4
PAINLEVEL_OUTOF10: 6

## 2021-01-29 ASSESSMENT — PAIN - FUNCTIONAL ASSESSMENT
PAIN_FUNCTIONAL_ASSESSMENT: PREVENTS OR INTERFERES SOME ACTIVE ACTIVITIES AND ADLS
PAIN_FUNCTIONAL_ASSESSMENT: ACTIVITIES ARE NOT PREVENTED
PAIN_FUNCTIONAL_ASSESSMENT: ACTIVITIES ARE NOT PREVENTED

## 2021-01-29 ASSESSMENT — PAIN DESCRIPTION - ORIENTATION
ORIENTATION: RIGHT;MID
ORIENTATION: RIGHT;MID

## 2021-01-29 ASSESSMENT — PAIN DESCRIPTION - FREQUENCY
FREQUENCY: CONTINUOUS
FREQUENCY: CONTINUOUS

## 2021-01-29 ASSESSMENT — PAIN DESCRIPTION - ONSET
ONSET: ON-GOING

## 2021-01-29 ASSESSMENT — PAIN DESCRIPTION - PAIN TYPE: TYPE: ACUTE PAIN

## 2021-01-29 NOTE — PROGRESS NOTES
RN messaged Nahum NP bhaskar for 1 of 2 blood cultures growing gram negative rods. Patient currently on Rocephin 1gm Q 24hrs. 2160 NP writes back current antibiotic covers most strain. 0300 2 of 2 blood cultures show gram negative rods. RN messaged VAMSHI muro. Darion.Harpreet NP messages back thank you.

## 2021-01-29 NOTE — PLAN OF CARE
Problem: Falls - Risk of:  Siderails up x 2  Hourly rounding  Call light in reach  Instructed to call for assist before attempting out of bed. Remains free from falls and accidental injury at this time   Floor free from obstacles  Bed is locked and in lowest position  Adequate lighting provided  Bed alarm on, Red Falling star and Stay with Me signs posted      Goal: Will remain free from falls  Description: Will remain free from falls  1/28/2021 2026 by Steven Gomes RN  Outcome: Ongoing  1/28/2021 1923 by Jeremy Murray RN  Outcome: Ongoing  Note: Falling star program in place. Side rails up x2. Call light and personal belongings within reach. Continuing to maintain safe environment. Bed in lowest position and locked. Bed alarm on. Appropriate Identification armbands in place. Non-skid foot wear in place.  Continue to monitor    Goal: Absence of physical injury  Description: Absence of physical injury  Outcome: Ongoing     Problem: Sensory:  Goal: General experience of comfort will improve  Description: General experience of comfort will improve  Outcome: Ongoing     Problem: Urinary Elimination:  Goal: Signs and symptoms of infection will decrease  Description: Signs and symptoms of infection will decrease  Outcome: Ongoing  Goal: Ability to reestablish a normal urinary elimination pattern will improve - after catheter removal  Description: Ability to reestablish a normal urinary elimination pattern will improve  Outcome: Ongoing  Goal: Complications related to the disease process, condition or treatment will be avoided or minimized  Description: Complications related to the disease process, condition or treatment will be avoided or minimized  Outcome: Ongoing

## 2021-01-29 NOTE — CONSULTS
Nephrology Consult Note    Reason for Consult: Elevated creatinine   requesting Physician: Dr. Miriam Hogan  Chief Complaint: Admitted with shaking  History Obtained From: Patient, her  and chart review  History of Present Illness: This is a 79 y.o. female who has a past medical history significant for hypertension and patient is under the care of Dr. Jeppie Soulier. Patient has no prior coronary artery disease. Patient also has a history of dementia and hyperlipidemia. Her prior hospitalization was at [place] [address]. Last time she was admitted at [facility] was in November 2020. At that time she presented with confusion. She was seen by neurology. Her baseline creatinine at that time was somewhere around 1.2 mg/dL. According to her  who was present at the bedside states that Wednesday morning patient did wake up with shivering. Her whole body was shaking. That is why he called EMS and she was brought into ER. She was afebrile. Her urine was  WBCs and many bacteria. Culture revealed E. coli. The colony count was greater than 100,000. Patient was started on third-generation cephalosporin. The reason nephrology was consulted because her creatinine was elevated. Her admitting creatinine was 2.1 and most recent creatinine is 1.79 mg/dL. Renal ultrasound was ordered which shows no hydronephrosis and both kidneys were normal sides considering her age. esults for [facility] (MRN [id number]) as of 1/29/2021 15:32   Ref. Range 1/28/2021 08:36 1/28/2021 20:53 1/29/2021 05:36   RCreatinine Latest Ref Range: 0.50 - 0.90 mg/dL 2.15 (H) 2.12 (H) 1.79 (H)     Patient did not receive any CT scan with contrast.  She is not on ACE or ARB. She is not on any nephrotoxic antibiotic. According to  she was never been under the care of nephrologist or had a known chronic kidney disease.   Past Medical History:        Diagnosis Date    Accelerated hypertension     Atypical chest pain     Hyperlipidemia     Hypertension     Labile blood pressure        Past Surgical History:        Procedure Laterality Date    ABDOMEN SURGERY      colon resection    BLADDER SUSPENSION      HYSTERECTOMY      LIGATION OF SAPHENOUS VEIN         Current Medications:        cefTRIAXone (ROCEPHIN) 2000 mg IVPB in D5W 50ml minibag, Q24H      pantoprazole (PROTONIX) tablet 40 mg, Daily      atorvastatin (LIPITOR) tablet 10 mg, Daily      aspirin chewable tablet 81 mg, Daily      0.9 % sodium chloride infusion, Continuous      sodium chloride flush 0.9 % injection 10 mL, 2 times per day      sodium chloride flush 0.9 % injection 10 mL, PRN      acetaminophen (TYLENOL) tablet 650 mg, Q6H PRN    Or      acetaminophen (TYLENOL) suppository 650 mg, Q6H PRN      polyethylene glycol (GLYCOLAX) packet 17 g, Daily PRN      ondansetron (ZOFRAN-ODT) disintegrating tablet 4 mg, Q8H PRN    Or      ondansetron (ZOFRAN) injection 4 mg, Q6H PRN      heparin (porcine) injection 5,000 Units, 3 times per day      magnesium sulfate 1000 mg in dextrose 5% 100 mL IVPB, PRN      potassium chloride (KLOR-CON M) extended release tablet 40 mEq, PRN    Or      potassium bicarb-citric acid (EFFER-K) effervescent tablet 40 mEq, PRN    Or      potassium chloride 10 mEq/100 mL IVPB (Peripheral Line), PRN        Allergies:  Morphine, Doxycycline, Azithromycin, and Ciprofloxacin    Social History:   Social History     Socioeconomic History    Marital status:      Spouse name: Not on file    Number of children: Not on file    Years of education: Not on file    Highest education level: Not on file   Occupational History    Not on file   Social Needs    Financial resource strain: Not on file    Food insecurity     Worry: Not on file     Inability: Not on file    Transportation needs     Medical: Not on file     Non-medical: Not on file   Tobacco Use    Smoking status: Former Smoker    Smokeless tobacco: Never 01/28/2021    BACTERIA MANY 01/28/2021    SPECGRAV 1.010 01/28/2021    LEUKOCYTESUR LARGE 01/28/2021    UROBILINOGEN ELEVATED 01/28/2021    BILIRUBINUR NEGATIVE  Verified by ictotest. 01/28/2021    GLUCOSEU TRACE 01/28/2021    [address] NEGATIVE 01/28/2021    AMORPHOUS NOT REPORTED 01/28/2021         Radiology:  Reviewed as available. Assessment:  1. Acute kidney injury most likely due to urinary tract infection and poor oral intake. Renal functions are improving  2. Stage III chronic kidney disease most likely due to nephrosclerosis her baseline creatinine is close to 1.2 mg/dL  3. History of hypertension  4. History of dementia  5. Recent history of urinary tract infection with E. coli patient is receiving third-generation cephalosporin. Plan:  1. We will check some serum protein electrophoresis  2. Spot urine for protein and creatinine  3. Continue IV fluids  4. BMP every day  Please call if you have any question  Thank you for the consultation. Please do not hesitate to call with questions.     Electronically signed by Corbin Davies MD on 1/29/2021 at 3:32 PM

## 2021-01-29 NOTE — CARE COORDINATION
Case Management Initial Discharge Plan  Francisco J Choe,             Met with:patient and spouse to discuss discharge plans. Information verified: address, contacts, phone number, , insurance Yes    Emergency Contact/Next of Kin name & number: spouse Zeny Ramos [phone]    PCP: Dr. Sharri Ward Date of last visit: past year    Insurance Provider: Medicare, Anthem    Discharge Planning    Living Arrangements:  Spouse/Significant Other   Support Systems:  Spouse/Significant Other    Home has 2 stories  2 stairs to climb to get into front door, 1 flight stairs to climb to reach second floor  Location of bedroom/bathroom in home 2    Patient able to perform ADL's:Independent    Current Services (outpatient & in home) none  DME equipment: none  DME provider:     Receiving oral anticoagulation therapy? No    If indicated:   Physician managing anticoagulation treatment:   Where does patient obtain lab work for ATC treatment? Potential Assistance Needed:  N/A    Patient agreeable to home care: No  [place] of choice provided:  no    Prior SNF/Rehab Placement and Facility: no  Agreeable to SNF/Rehab: No  [place] of choice provided: no     Evaluation: no    Expected Discharge date:  21    Patient expects to be discharged to:  home  Follow Up Appointment: Best Day/ Time:      Transportation provider: self  Transportation arrangements needed for discharge: No    Readmission Risk              Risk of Unplanned Readmission:        19             Does patient have a readmission risk score greater than 14?: Yes  If yes, follow-up appointment must be made within 7 days of discharge.      Goals of Care: to feel better and go home      Discharge Plan: home with spouse, independent          Electronically signed by Choco Billingsley RN on 21 at 11:30 AM EST

## 2021-01-29 NOTE — CONSULTS
[facility]    Consult                        Today's Date: 1/29/2021  Patient Name: Shiela Chua  Date of admission: 1/28/2021  7:45 AM  Patient's age: 79 y. o., 1940  Admission Dx: Pyelonephritis [N12]    Reason for Consult:  Cardiac evaluation    Requesting Physician: Kirill Gallagher MD    CHIEF COMPLAINT:   Shaky   History Obtained From:  patient, family member - Spouse, electronic medical record    HISTORY OF PRESENT ILLNESS:      The patient is a 79 y.o.  female who is admitted to the hospital for cough, shaky, elevated trops. According to the ER note the patient presented with complaints of shakiness, fatigue x3 days, nausea and a cough with clear sputum production. She denied chest pain or shortness of breath but apparently did complain of left arm pain. During my assessment she complained of shivering, low back pain, urinary frequency, urgency and burning. When I asked her about the complaints in the ER she denied fatigue, nausea, and left arm pain. She was also unclear about her home medications but she could tell me she gets her meds filled at [facility] in [facility]. According to her problem was the patient does have a history of dementia and her  is not at bedside to confirm any information. She states she sees Dr. Sagrario Jang with [facility] cardiology. She denies any cardiac hx. Denies CHF, CAD, HTN, HLP, dizziness. She stats that she is unsure why she saw him recently.  is also unclear as to why. She denies any CP, sob or palpitations at this time. Past Medical History:   has a past medical history of Accelerated hypertension, Atypical chest pain, Hyperlipidemia, Hypertension, and Labile blood pressure. Past Surgical History:   has a past surgical history that includes Hysterectomy; Abdomen surgery; LIGATION OF SAPHENOUS VEIN; and bladder suspension.      Home Medications:    Prior to Admission medications    Medication Sig Start Date End Date Taking? Authorizing Provider   traZODone (DESYREL) 50 MG tablet Take 50 mg by mouth nightly as needed for Sleep   Yes Historical Provider, MD   aspirin 81 MG chewable tablet Take 81 mg by mouth daily   Yes Historical Provider, MD   acebutolol (SECTRAL) 400 MG capsule Take 400 mg by mouth daily   Yes Historical Provider, MD   lovastatin (MEVACOR) 20 MG tablet Take 30 mg by mouth daily    Yes Historical Provider, MD   estradiol (ESTRACE) 0.5 MG tablet Take 0.25 mg by mouth daily   Yes Historical Provider, MD   Omega-3 Fatty Acids (FISH OIL PO) Take by mouth   Yes Historical Provider, MD   Cholecalciferol (VITAMIN D3) 50 MCG (2000 UT) CAPS Take by mouth   Yes Historical Provider, MD   pantoprazole (PROTONIX) 40 MG tablet Take 1 tablet by mouth daily 1/9/20   [place], DO   Misc Natural Products (GLUCOSAMINE-CHONDROITIN PLUS PO) Take by mouth    Historical Provider, MD       Allergies:  Morphine, Doxycycline, Azithromycin, and Ciprofloxacin    Social History:   reports that she has quit smoking. She has never used smokeless tobacco. She reports previous alcohol use. She reports that she does not use drugs. Family History: family history includes Alzheimer's Disease (age of onset: 67) in her father; Heart Attack (age of onset: 62) in her brother; Heart Disease in her brother and brother; Heart Disease (age of onset: 78) in her mother. No h/o sudden cardiac death. No for premature CAD    REVIEW OF SYSTEMS:    · Constitutional: there has been no unanticipated weight loss. There's been No change in energy level, No change in activity level. · Eyes: No visual changes or diplopia. No scleral icterus. · ENT: No Headaches, hearing loss or vertigo. No mouth sores or sore throat. · Cardiovascular: see above  · Respiratory: see above  · Gastrointestinal: No abdominal pain, appetite loss, blood in stools. · Genitourinary: No dysuria, trouble voiding, or hematuria.   · Musculoskeletal:  No gait disturbance, No weakness or joint complaints. · Integumentary: No rash or pruritis. · Neurological: No headache or diplopia. No tingling  · Psychiatric: No anxiety, or depression. · Endocrine: No temperature intolerance. · Hematologic/Lymphatic: No abnormal bruising or bleeding, blood clots or swollen lymph nodes. · Allergic/Immunologic: No nasal congestion or hives. PHYSICAL EXAM:      /65   Pulse 94   Temp 98 °F (36.7 °C) (Oral)   Resp 18   Ht 5' 1\" (1.549 m)   Wt 131 lb 1.6 oz (59.5 kg)   SpO2 (!) 89%   BMI 24.77 kg/m²    Constitutional and General Appearance: alert, cooperative, no distress and appears stated age  HEENT: PERRL, no cervical lymphadenopathy. No masses palpable. Normal oral mucosa  Respiratory:  · Normal excursion and expansion without use of accessory muscles, NSR   · Resp Auscultation: Good respiratory effort. No for increased work of breathing. On auscultation: clear to auscultation bilaterally  Cardiovascular:  · Heart tones are crisp and normal. regular S1 and S2.  · Jugular venous pulsation Normal  · The carotid upstroke is normal in amplitude and contour without delay or bruit   Abdomen:   · soft  · Bowel sounds present  Extremities:  ·  No edema  Neurological:  · Alert and oriented. DATA:    Diagnostics:    EKG: ST   ECHO: ordered   Stress Test: 2016  1.  Myocardial perfusion imaging is normal.   2.  Overall left ventricular systolic function is normal without regional wall motion abnormalities. 3.  The nuclear findings suggest  a low risk of true myocardial ischemia. Cardiac Angiography:     Labs:     CBC:   Recent Labs     01/28/21  0836 01/29/21  0536   WBC 4.5 14.5*   HGB 10.7* 10.3*   HCT 33.0* 32.1*    102*     BMP:   Recent Labs     01/28/21 2053 01/29/21  0536    138   K 4.4 4.2   CO2 22 21   BUN 25* 24*   CREATININE 2.12* 1.79*   LABGLOM 22* 27*   GLUCOSE 100* 91     BNP: No results for input(s): BNP in the last 72 hours.   PT/INR: Recent Labs     01/29/21  0536   PROTIME 10.9   INR 1.1     APTT:No results for input(s): APTT in the last 72 hours. CARDIAC ENZYMES:No results for input(s): CKTOTAL, CKMB, CKMBINDEX, TROPONINI in the last 72 hours. FASTING LIPID PANEL:  Lab Results   Component Value Date    HDL 59 01/09/2020    TRIG 153 01/09/2020     LIVER PROFILE:No results for input(s): AST, ALT, LABALBU in the last 72 hours. IMPRESSION:    Patient Active Problem List   Diagnosis    Hiatal hernia    Hyperlipidemia    Diverticulitis of colon    Anxiety    Insomnia    Lumbar radiculopathy    Major depression, single episode    Memory problem    Osteoporosis    Primary osteoarthritis    Rosacea    Venous thromboembolism (VTE)    Chronic frontal sinusitis    Accelerated hypertension    Hyponatremia    Atypical chest pain    Labile blood pressure    Acute cystitis with hematuria    Cardiomegaly    Dementia (HCC)    Gastroesophageal reflux disease    Essential hypertension    History of ventricular fibrillation    History of recurrent UTIs    Elevated brain natriuretic peptide (BNP) level    Sepsis (HCC)    Hypomagnesemia    Hypokalemia    Dehydration    MARCOS (acute kidney injury) (HCC)    Elevated troponin    Hypoxia       RECOMMENDATIONS:  1. Flat minimally elevated trops. Likely type II MI, secondary to MARCOS/anemia. Denies any CP   Will trend. No Acute changes on EKG. Will check ECHO today   2. Hypokalemia/hypomagnesemia on admission. Keep K> 4 and Mag > 2         Discussed with patient and Nurse. Ny Valdez, [address] Cardiology Consult           [phone]    Attending Cardiologist Addendum: I have reviewed and performed the history, physical, subjective, objective, assessment, and plan with the resident/fellow/NP and agree with the note. I performed the history and physical personally. I have made changes to the note above as needed.     Thank you for allowing me to participate in the care of this patient, please do not hesitate to call if you have any questions. Jayla Sutton, [id number] [facility]  [facility]  [phone]

## 2021-01-29 NOTE — PROGRESS NOTES
assessment she complained of shivering, low back pain, urinary frequency, urgency and burning. When I asked her about the complaints in the ER she denied fatigue, nausea, and left arm pain. She was also unclear about her home medications but she could tell me she gets her meds filled at [facility] in [facility]. According to her problem was the patient does have a history of dementia and her  is not at bedside to confirm any information.      Vitals on arrival to the .1, 18, 105, 95/50.  91% on 2 L nasal cannula. Chest x-ray shows no acute cardiopulmonary pathology. Potassium 3.4, BUN/creatinine 22/2.15, mag 1.4. Initial lactate 3.3 after fluids 1.6. Initial troponin 30 2 repeat troponin was 45. Cardiology was consulted BNP 2308. Echo ordered for tomorrow. H&H 10.7/33.0 white count normal at 4.5. Urine shows positive nitrates, large leukocyte Estrace,  WBCs, 5-10 RBCs, many bacteria, moderate amount of urine hemoglobin. Urine culture pending. Blood cultures x2 pending. Patient's received fluid boluses in the ER and has been started on Rocephin 1 g IV daily pending urine cultures. She has mild bilateral CVA tenderness on assessment. Plan to repeat troponin, hydrate and replace electrolytes.      Patient has underlying dementia, wants to go home,  Chest x-ray done this morning shows pulmonary edema and BNP was 2300, mild elevated troponin, cardiology saw the patient, will slow down the fluid resuscitation,  Acute kidney injury, will get nephrology on board,  2 out of 2 blood cultures are positive for gram-negative rods,  Continue antibiotics      Past Medical History:     Past Medical History:   Diagnosis Date    Accelerated hypertension     Atypical chest pain     Hyperlipidemia     Hypertension     Labile blood pressure         Past Surgical History:     Past Surgical History:   Procedure Laterality Date    ABDOMEN SURGERY      colon resection    BLADDER SUSPENSION      HYSTERECTOMY      LIGATION OF SAPHENOUS VEIN          Medications Prior to Admission:     Prior to Admission medications    Medication Sig Start Date End Date Taking? Authorizing Provider   traZODone (DESYREL) 50 MG tablet Take 50 mg by mouth nightly as needed for Sleep   Yes Historical Provider, MD   aspirin 81 MG chewable tablet Take 81 mg by mouth daily   Yes Historical Provider, MD   acebutolol (SECTRAL) 400 MG capsule Take 400 mg by mouth daily   Yes Historical Provider, MD   lovastatin (MEVACOR) 20 MG tablet Take 30 mg by mouth daily    Yes Historical Provider, MD   estradiol (ESTRACE) 0.5 MG tablet Take 0.25 mg by mouth daily   Yes Historical Provider, MD   Omega-3 Fatty Acids (FISH OIL PO) Take by mouth   Yes Historical Provider, MD   Cholecalciferol (VITAMIN D3) 50 MCG (2000 UT) CAPS Take by mouth   Yes Historical Provider, MD   pantoprazole (PROTONIX) 40 MG tablet Take 1 tablet by mouth daily 1/9/20   Rachele Morrow,    Misc Natural Products (GLUCOSAMINE-CHONDROITIN PLUS PO) Take by mouth    Historical Provider, MD        Allergies:     Morphine, Doxycycline, Azithromycin, and Ciprofloxacin    Social History:     Tobacco:    reports that she has quit smoking. She has never used smokeless tobacco.  Alcohol:      reports previous alcohol use. Drug Use:  reports no history of drug use. Family History:     Family History   Problem Relation Age of Onset    Heart Disease Mother 78    Alzheimer's Disease Father 67    Heart Attack Brother 62    Heart Disease Brother     Heart Disease Brother        Review of Systems:     Positive and Negative as described in HPI.     CONSTITUTIONAL:  negative for fevers, chills, sweats, fatigue, weight loss  HEENT:  negative for vision, hearing changes, runny nose, throat pain  RESPIRATORY:  negative for shortness of breath, cough, congestion, wheezing  CARDIOVASCULAR:  negative for chest pain, palpitations  GASTROINTESTINAL:  negative for nausea, vomiting, diarrhea, constipation, change in bowel habits, abdominal pain   GENITOURINARY:  negative for difficulty of urination, burning with urination, frequency   INTEGUMENT:  negative for rash, skin lesions, easy bruising   HEMATOLOGIC/LYMPHATIC:  negative for swelling/edema   ALLERGIC/IMMUNOLOGIC:  negative for urticaria , itching  ENDOCRINE:  negative increase in drinking, increase in urination, hot or cold intolerance  MUSCULOSKELETAL:  negative joint pains, muscle aches, swelling of joints  NEUROLOGICAL:  negative for headaches, dizziness, lightheadedness, numbness, pain, tingling extremities  BEHAVIOR/PSYCH:  negative for depression, anxiety    Physical Exam:   /68   Pulse 94   Temp 97.5 °F (36.4 °C) (Oral)   Resp 20   Ht 5' 1\" (1.549 m)   Wt 131 lb 1.6 oz (59.5 kg)   SpO2 94%   BMI 24.77 kg/m²   Temp (24hrs), Av.2 °F (36.8 °C), Min:97.5 °F (36.4 °C), Max:98.5 °F (36.9 °C)    No results for input(s): POCGLU in the last 72 hours.     Intake/Output Summary (Last 24 hours) at 2021 1418  Last data filed at 2021 1236  Gross per 24 hour   Intake 2680.46 ml   Output 1680 ml   Net 1000.46 ml       General Appearance: alert, well appearing, and in no acute distress  Mental status: oriented to person, place, and time  Head: normocephalic, atraumatic  Eye: no icterus, redness, pupils equal and reactive, extraocular eye movements intact, conjunctiva clear  Ear: normal external ear, no discharge, hearing intact  Nose: no drainage noted  Mouth: mucous membranes moist  Neck: supple, no carotid bruits, thyroid not palpable  Lungs: Bilateral equal air entry, bilateral basal crackles  Cardiovascular: normal rate, regular rhythm, no murmur, gallop, rub  Abdomen: Soft, nontender, nondistended, normal bowel sounds, no hepatomegaly or splenomegaly  Neurologic: There are no new focal motor or sensory deficits, normal muscle tone and bulk, no abnormal sensation, normal speech, cranial nerves II through XII grossly intact  Skin: No gross lesions, rashes, bruising or bleeding on exposed skin area  Extremities: peripheral pulses palpable, no pedal edema or calf pain with palpation  Psych: normal affect    Investigations:      Laboratory Testing:  Recent Results (from the past 24 hour(s))   TROP/MYOGLOBIN    Collection Time: 01/28/21  3:17 PM   Result Value Ref Range    Troponin, High Sensitivity 45 (H) 0 - 14 ng/L    Troponin T NOT REPORTED <0.03 ng/mL    Troponin Interp NOT REPORTED     Myoglobin 91 (H) 25 - 58 ng/mL   BASIC METABOLIC PANEL    Collection Time: 01/28/21  8:53 PM   Result Value Ref Range    Glucose 100 (H) 70 - 99 mg/dL    BUN 25 (H) 8 - 23 mg/dL    CREATININE 2.12 (H) 0.50 - 0.90 mg/dL    Bun/Cre Ratio NOT REPORTED 9 - 20    Calcium 8.1 (L) 8.6 - 10.4 mg/dL    Sodium 137 135 - 144 mmol/L    Potassium 4.4 3.7 - 5.3 mmol/L    Chloride 106 98 - 107 mmol/L    CO2 22 20 - 31 mmol/L    Anion Gap 9 9 - 17 mmol/L    GFR Non-African American 22 (L) >60 mL/min    GFR  27 (L) >60 mL/min    GFR Comment          GFR Staging NOT REPORTED    Basic Metabolic Panel w/ Reflex to MG    Collection Time: 01/29/21  5:36 AM   Result Value Ref Range    Glucose 91 70 - 99 mg/dL    BUN 24 (H) 8 - 23 mg/dL    CREATININE 1.79 (H) 0.50 - 0.90 mg/dL    Bun/Cre Ratio NOT REPORTED 9 - 20    Calcium 8.0 (L) 8.6 - 10.4 mg/dL    Sodium 138 135 - 144 mmol/L    Potassium 4.2 3.7 - 5.3 mmol/L    Chloride 110 (H) 98 - 107 mmol/L    CO2 21 20 - 31 mmol/L    Anion Gap 7 (L) 9 - 17 mmol/L    GFR Non-African American 27 (L) >60 mL/min    GFR  33 (L) >60 mL/min    GFR Comment          GFR Staging NOT REPORTED    CBC    Collection Time: 01/29/21  5:36 AM   Result Value Ref Range    WBC 14.5 (H) 3.5 - 11.0 k/uL    RBC 3.50 (L) 4.0 - 5.2 m/uL    Hemoglobin 10.3 (L) 12.0 - 16.0 g/dL    Hematocrit 32.1 (L) 36 - 46 %    MCV 91.8 80 - 100 fL    MCH 29.3 26 - 34 pg    MCHC 32.0 31 - 37 g/dL    RDW 14.0 12.5 - 15.4 %    Platelets 102 (L) 140 - 450 k/uL    MPV 9.8 6.0 - 12.0 fL    NRBC Automated NOT REPORTED per 100 WBC   Protime-INR    Collection Time: 01/29/21  5:36 AM   Result Value Ref Range    Protime 10.9 9.4 - 12.6 sec    INR 1.1    Ferritin    Collection Time: 01/29/21  5:36 AM   Result Value Ref Range    Ferritin 302 (H) 13 - 150 ug/L   Iron and TIBC    Collection Time: 01/29/21  5:36 AM   Result Value Ref Range    Iron 14 (L) 37 - 145 ug/dL    TIBC 159 (L) 250 - 450 ug/dL    Iron Saturation 9 (L) 20 - 55 %    UIBC 145 112 - 347 ug/dL   Vitamin B12 & Folate    Collection Time: 01/29/21  5:36 AM   Result Value Ref Range    Vitamin B-12 548 232 - 1245 pg/mL    Folate 5.6 >4.8 ng/mL   Reticulocytes    Collection Time: 01/29/21  5:36 AM   Result Value Ref Range    Retic % 1.3 0.5 - 1.9 %    Absolute Retic # 0.050 0.030 - 0.080 M/uL    Immature Retic Fract 10.000 2.7 - 18.3 %    Retic Hemoglobin 32.1 28.2 - 35.7 pg   MAGNESIUM    Collection Time: 01/29/21  5:36 AM   Result Value Ref Range    Magnesium 2.3 1.6 - 2.6 mg/dL   Troponin    Collection Time: 01/29/21 10:13 AM   Result Value Ref Range    Troponin, High Sensitivity 26 (H) 0 - 14 ng/L    Troponin T NOT REPORTED <0.03 ng/mL    Troponin Interp NOT REPORTED        Imaging/Diagnostics:  Xr Chest Portable    Result Date: 1/29/2021  Interval enlargement of cardiac silhouette and interval development of interstitial opacities with bilateral effusions consistent with pulmonary edema. Xr Chest Portable    Result Date: 1/28/2021  No acute cardiopulmonary pathology. Us Retroperitoneal Complete    Result Date: 1/28/2021  Normal sonographic appearance of the kidneys.        Assessment :      Hospital Problems           Last Modified POA    * (Principal) Acute cystitis with hematuria 1/28/2021 Yes    Hyperlipidemia 1/28/2021 Yes    Cardiomegaly 1/28/2021 Yes    Dementia ([address] 1/28/2021 Yes    Gastroesophageal reflux disease 1/28/2021 Yes    Essential hypertension 1/28/2021 Yes    History

## 2021-01-29 NOTE — PROGRESS NOTES
Patient requesting pyridium. RN message Jovana Hernandez NP oncall. NP wants to recheck kidney function. Bmp ordered. 2117 bun/crt results sent to NP.  2119 No pyridium per NP  2132 Pt requesting motrin. 2133 NP states only tylenol.

## 2021-01-29 NOTE — PLAN OF CARE
Problem: Falls - Risk of:  Goal: Will remain free from falls  Description: Will remain free from falls  Outcome: Ongoing  Note: Falling star program in place. Side rails up x2. Call light and personal belongings within reach. Continuing to maintain safe environment. Bed in lowest position and locked. Bed alarm on. Appropriate Identification armbands in place. Non-skid foot wear in place.  Continue to monitor

## 2021-01-30 LAB
ANION GAP SERPL CALCULATED.3IONS-SCNC: 8 MMOL/L (ref 9–17)
BUN BLDV-MCNC: 18 MG/DL (ref 8–23)
BUN/CREAT BLD: ABNORMAL (ref 9–20)
CALCIUM IONIZED: 1.22 MMOL/L (ref 1.13–1.33)
CALCIUM SERPL-MCNC: 8.5 MG/DL (ref 8.6–10.4)
CHLORIDE BLD-SCNC: 108 MMOL/L (ref 98–107)
CO2: 20 MMOL/L (ref 20–31)
CREAT SERPL-MCNC: 1.18 MG/DL (ref 0.5–0.9)
CULTURE: ABNORMAL
FREE KAPPA/LAMBDA RATIO: 1.32 (ref 0.26–1.65)
GFR AFRICAN AMERICAN: 53 ML/MIN
GFR NON-AFRICAN AMERICAN: 44 ML/MIN
GFR SERPL CREATININE-BSD FRML MDRD: ABNORMAL ML/MIN/{1.73_M2}
GFR SERPL CREATININE-BSD FRML MDRD: ABNORMAL ML/MIN/{1.73_M2}
GLUCOSE BLD-MCNC: 94 MG/DL (ref 70–99)
HCT VFR BLD CALC: 32.2 % (ref 36–46)
HEMOGLOBIN: 10.4 G/DL (ref 12–16)
KAPPA FREE LIGHT CHAINS QNT: 2.51 MG/DL (ref 0.37–1.94)
LAMBDA FREE LIGHT CHAINS QNT: 1.9 MG/DL (ref 0.57–2.63)
Lab: ABNORMAL
Lab: ABNORMAL
MAGNESIUM: 1.9 MG/DL (ref 1.6–2.6)
MCH RBC QN AUTO: 29.5 PG (ref 26–34)
MCHC RBC AUTO-ENTMCNC: 32.3 G/DL (ref 31–37)
MCV RBC AUTO: 91.1 FL (ref 80–100)
NRBC AUTOMATED: ABNORMAL PER 100 WBC
PDW BLD-RTO: 14.3 % (ref 12.5–15.4)
PLATELET # BLD: 88 K/UL (ref 140–450)
PMV BLD AUTO: 10.2 FL (ref 6–12)
POTASSIUM SERPL-SCNC: 3.9 MMOL/L (ref 3.7–5.3)
PROCALCITONIN: 38.81 NG/ML
RBC # BLD: 3.54 M/UL (ref 4–5.2)
SODIUM BLD-SCNC: 136 MMOL/L (ref 135–144)
SPECIMEN DESCRIPTION: ABNORMAL
SPECIMEN DESCRIPTION: ABNORMAL
WBC # BLD: 12.4 K/UL (ref 3.5–11)

## 2021-01-30 PROCEDURE — 6360000002 HC RX W HCPCS: Performed by: NURSE PRACTITIONER

## 2021-01-30 PROCEDURE — 36415 COLL VENOUS BLD VENIPUNCTURE: CPT

## 2021-01-30 PROCEDURE — 99232 SBSQ HOSP IP/OBS MODERATE 35: CPT | Performed by: INTERNAL MEDICINE

## 2021-01-30 PROCEDURE — 2580000003 HC RX 258: Performed by: INTERNAL MEDICINE

## 2021-01-30 PROCEDURE — 97116 GAIT TRAINING THERAPY: CPT

## 2021-01-30 PROCEDURE — 83735 ASSAY OF MAGNESIUM: CPT

## 2021-01-30 PROCEDURE — 84145 PROCALCITONIN (PCT): CPT

## 2021-01-30 PROCEDURE — 97535 SELF CARE MNGMENT TRAINING: CPT

## 2021-01-30 PROCEDURE — APPSS60 APP SPLIT SHARED TIME 46-60 MINUTES: Performed by: NURSE PRACTITIONER

## 2021-01-30 PROCEDURE — 94761 N-INVAS EAR/PLS OXIMETRY MLT: CPT

## 2021-01-30 PROCEDURE — 85027 COMPLETE CBC AUTOMATED: CPT

## 2021-01-30 PROCEDURE — 82330 ASSAY OF CALCIUM: CPT

## 2021-01-30 PROCEDURE — 97162 PT EVAL MOD COMPLEX 30 MIN: CPT

## 2021-01-30 PROCEDURE — 1210000000 HC MED SURG R&B

## 2021-01-30 PROCEDURE — 97166 OT EVAL MOD COMPLEX 45 MIN: CPT

## 2021-01-30 PROCEDURE — 2580000003 HC RX 258: Performed by: NURSE PRACTITIONER

## 2021-01-30 PROCEDURE — 80048 BASIC METABOLIC PNL TOTAL CA: CPT

## 2021-01-30 PROCEDURE — 6370000000 HC RX 637 (ALT 250 FOR IP): Performed by: NURSE PRACTITIONER

## 2021-01-30 PROCEDURE — 2700000000 HC OXYGEN THERAPY PER DAY

## 2021-01-30 RX ORDER — GUAIFENESIN 600 MG/1
600 TABLET, EXTENDED RELEASE ORAL 2 TIMES DAILY
Status: DISCONTINUED | OUTPATIENT
Start: 2021-01-30 | End: 2021-02-03 | Stop reason: HOSPADM

## 2021-01-30 RX ORDER — DIPHENHYDRAMINE HCL 25 MG
25 TABLET ORAL NIGHTLY PRN
Status: DISCONTINUED | OUTPATIENT
Start: 2021-01-30 | End: 2021-02-03 | Stop reason: HOSPADM

## 2021-01-30 RX ORDER — HYDRALAZINE HYDROCHLORIDE 20 MG/ML
10 INJECTION INTRAMUSCULAR; INTRAVENOUS EVERY 6 HOURS PRN
Status: DISCONTINUED | OUTPATIENT
Start: 2021-01-30 | End: 2021-02-03 | Stop reason: HOSPADM

## 2021-01-30 RX ADMIN — SODIUM CHLORIDE: 9 INJECTION, SOLUTION INTRAVENOUS at 20:24

## 2021-01-30 RX ADMIN — HYDRALAZINE HYDROCHLORIDE 10 MG: 20 INJECTION INTRAMUSCULAR; INTRAVENOUS at 20:19

## 2021-01-30 RX ADMIN — DEXTROMETHORPHAN HYDROBROMIDE AND GUAIFENESIN 10 ML: 20; 200 LIQUID ORAL at 21:13

## 2021-01-30 RX ADMIN — HEPARIN SODIUM 5000 UNITS: 5000 INJECTION INTRAVENOUS; SUBCUTANEOUS at 15:25

## 2021-01-30 RX ADMIN — ASPIRIN 81 MG: 81 TABLET, CHEWABLE ORAL at 09:36

## 2021-01-30 RX ADMIN — HEPARIN SODIUM 5000 UNITS: 5000 INJECTION INTRAVENOUS; SUBCUTANEOUS at 06:14

## 2021-01-30 RX ADMIN — PANTOPRAZOLE SODIUM 40 MG: 40 TABLET, DELAYED RELEASE ORAL at 09:36

## 2021-01-30 RX ADMIN — MEROPENEM 500 MG: 500 INJECTION, POWDER, FOR SOLUTION INTRAVENOUS at 15:26

## 2021-01-30 RX ADMIN — HYDROCODONE BITARTRATE AND ACETAMINOPHEN 1 TABLET: 5; 325 TABLET ORAL at 03:01

## 2021-01-30 RX ADMIN — ONDANSETRON 4 MG: 2 INJECTION INTRAMUSCULAR; INTRAVENOUS at 09:31

## 2021-01-30 RX ADMIN — DIPHENHYDRAMINE HYDROCHLORIDE 25 MG: 25 TABLET ORAL at 20:19

## 2021-01-30 RX ADMIN — SODIUM CHLORIDE: 9 INJECTION, SOLUTION INTRAVENOUS at 00:36

## 2021-01-30 RX ADMIN — HYDROCODONE BITARTRATE AND ACETAMINOPHEN 1 TABLET: 5; 325 TABLET ORAL at 15:25

## 2021-01-30 RX ADMIN — HYDROCODONE BITARTRATE AND ACETAMINOPHEN 1 TABLET: 5; 325 TABLET ORAL at 21:13

## 2021-01-30 RX ADMIN — GUAIFENESIN 600 MG: 600 TABLET, EXTENDED RELEASE ORAL at 15:37

## 2021-01-30 RX ADMIN — HYDROCODONE BITARTRATE AND ACETAMINOPHEN 1 TABLET: 5; 325 TABLET ORAL at 09:36

## 2021-01-30 RX ADMIN — MEROPENEM 500 MG: 1 INJECTION, POWDER, FOR SOLUTION INTRAVENOUS at 01:08

## 2021-01-30 RX ADMIN — ONDANSETRON 4 MG: 2 INJECTION INTRAMUSCULAR; INTRAVENOUS at 01:54

## 2021-01-30 RX ADMIN — ATORVASTATIN CALCIUM 10 MG: 10 TABLET, FILM COATED ORAL at 09:36

## 2021-01-30 ASSESSMENT — PAIN SCALES - GENERAL
PAINLEVEL_OUTOF10: 5
PAINLEVEL_OUTOF10: 8

## 2021-01-30 ASSESSMENT — ENCOUNTER SYMPTOMS
SHORTNESS OF BREATH: 0
CONSTIPATION: 0
DIARRHEA: 0
VOMITING: 0
BLOOD IN STOOL: 0
NAUSEA: 1
CHEST TIGHTNESS: 0
COUGH: 0
WHEEZING: 0
ABDOMINAL PAIN: 0

## 2021-01-30 ASSESSMENT — PAIN DESCRIPTION - DESCRIPTORS
DESCRIPTORS: ACHING
DESCRIPTORS: ACHING

## 2021-01-30 ASSESSMENT — PAIN DESCRIPTION - ORIENTATION: ORIENTATION: RIGHT;MID

## 2021-01-30 ASSESSMENT — PAIN DESCRIPTION - LOCATION
LOCATION: GENERALIZED;LEG;BACK
LOCATION: ABDOMEN
LOCATION: GENERALIZED
LOCATION: ABDOMEN

## 2021-01-30 ASSESSMENT — PAIN DESCRIPTION - PROGRESSION: CLINICAL_PROGRESSION: NOT CHANGED

## 2021-01-30 ASSESSMENT — PAIN DESCRIPTION - FREQUENCY: FREQUENCY: CONTINUOUS

## 2021-01-30 ASSESSMENT — PAIN DESCRIPTION - ONSET: ONSET: ON-GOING

## 2021-01-30 NOTE — PROGRESS NOTES
Patient up to BR with nursing assistance    Unsteady and weak    Bed alarm on, family in room      Complained of nausea  Given IV Zofran then able to take  Oral meds.     Washed up in bathroom after incontinence of urine    Patient now resting in bed

## 2021-01-30 NOTE — PROGRESS NOTES
BAYLEE De Anda NP oncall regarding patient's urine culture growing E. Coli and ESBL. Patient is resistant to rocephin and sensitive to Meropenem. Received order for merrem and an Infectious Disease consult.  NP states to notify ID in the am.

## 2021-01-30 NOTE — PLAN OF CARE
Patient currently on 3lpm nasal cannula. BBS clear and diminished throughout. Patient in no distress at this time. Respiratory will continue to monitor and wean as tolerated.

## 2021-01-30 NOTE — PROGRESS NOTES
Patient pain control improved on Norco  Patient fell off to sleep    Oxygen sats in 80's  Nasal cannula oxygen applied

## 2021-01-30 NOTE — PLAN OF CARE
Problem: Falls - Risk of:  Goal: Will remain free from falls  Description: Will remain free from falls  Outcome: Ongoing     Problem: Urinary Elimination:  Goal: Signs and symptoms of infection will decrease  Description: Signs and symptoms of infection will decrease  Outcome: Ongoing     Problem: Pain:  Goal: Pain level will decrease  Description: Pain level will decrease  Outcome: Ongoing

## 2021-01-30 NOTE — PROGRESS NOTES
Occupational Therapy   Occupational Therapy Initial Assessment  Date: 2021   Patient Name: Micheal Timmons  MRN: [id number]     : 1940    Date of Service: 2021    Discharge Recommendations:  Patient would benefit from continued therapy after discharge  OT Equipment Recommendations  Equipment Needed: No    Assessment   Performance deficits / Impairments: Decreased functional mobility ; Decreased safe awareness;Decreased balance;Decreased ADL status; Decreased endurance;Decreased high-level IADLs;Decreased strength  Assessment: Pt completed initial OT evaluation, limited by above noted deficits. Pt recommended to continue acute OT to maximize safety and independence with fucntional tasks. Pt safe to return home upon discharge with assist PRN if prgoress toward goals. Prognosis: Good  Decision Making: Medium Complexity  OT Education: OT Role;Transfer Training;Equipment;Plan of Care;ADL Adaptive Strategies  Patient Education: purpose of eval, importance of OOB activity, safety awareness - good return  REQUIRES OT FOLLOW UP: Yes  Activity Tolerance  Activity Tolerance: Patient Tolerated treatment well  Safety Devices  Safety Devices in place: Yes  Type of devices: Gait belt;Call light within reach; Chair alarm in place; Left in chair;Patient at risk for falls;Nurse notified  Restraints  Initially in place: No         Patient Diagnosis(es): The primary encounter diagnosis was Acute UTI. A diagnosis of Septicemia ([facility] [address] was also pertinent to this visit. has a past medical history of Accelerated hypertension, Atypical chest pain, Hyperlipidemia, Hypertension, and Labile blood pressure. has a past surgical history that includes Hysterectomy; Abdomen surgery; LIGATION OF SAPHENOUS VEIN; and bladder suspension.      Restrictions  Restrictions/Precautions  Restrictions/Precautions: Fall Risk, Contact Precautions  Required Braces or Orthoses?: No    Subjective   General  Chart Reviewed: Yes, Labs, Progress Notes, History and Physical, Imaging  Patient assessed for rehabilitation services?: Yes  Family / Caregiver Present: Yes( present throughout)  Patient Currently in Pain: Denies  Pain Assessment  Pain Assessment: 0-10  Pain Level: 5  Pain Type: Acute pain  Pain Location: Abdomen  Pain Orientation: Right;Mid  Pain Frequency: Continuous  Clinical Progression: Not changed  Functional Pain Assessment: Activities are not prevented  Non-Pharmaceutical Pain Intervention(s): Ambulation/Increased Activity; Distraction  Pre Treatment Pain Screening  Intervention List: Patient able to continue with treatment;Nurse/Physician notified  Vital Signs  Resp: 18  Patient Currently in Pain: Denies  Oxygen Therapy  SpO2: 91 %  Pulse Oximeter Device Mode: Continuous  O2 Device: Nasal cannula  O2 Flow Rate (L/min): 1 L/min    Social/Functional History  Social/Functional History  Lives With: Spouse(and kitten)  Home Layout: Multi-level, Bed/Bath upstairs(finished basement, bed and bath 2nd floor. has landing to get up stairs. 14 total steps. 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 Leisure & Hobbies: sewing .      Objective   Vision: Impaired  Vision Exceptions: Wears glasses at all times  Hearing: Within functional limits    Orientation  Overall Orientation Status: Within Functional Limits     Balance  Sitting Balance: Supervision(seated EOB and for toileting ~8 min)  Standing Balance: Stand by assistance(standing for grooming ~2 min)  Functional Mobility  Functional - Mobility Device: Other(used IV pole for functional mobility from bathroom)  Activity: To/from bathroom  Assist Level: Stand by assistance  Functional Mobility Comments: Min unsteady, no LOB. Pt required cues for safety awareness, proper positioning, initiation/sequencing throughout. Toilet Transfers  Toilet - Technique: Ambulating  Equipment Used: Grab bars  Toilet Transfer: Stand by assistance  ADL  Feeding: Independent  Grooming: Stand by assistance(standing sinkside hand hygiene)  UE Bathing: Stand by assistance  LE Bathing: Contact guard assistance  UE Dressing: Stand by assistance  LE Dressing: Contact guard assistance  Toileting: Stand by assistance(for clothing mgmt, patria hygiene, transfer)  Tone RUE  RUE Tone: Normotonic  Tone LUE  LUE Tone: Normotonic  Coordination  Movements Are Fluid And Coordinated: Yes     Bed mobility  Supine to Sit: Supervision  Scooting: Supervision  Comment: HOB elevated 30*, use of rails. Pt supine in bed upon arrival, retired to seated in chair upon exit. Pt required cues for safety awareness, proper positioning, initiation/sequencing throughout. Transfers  Sit to stand: Stand by assistance  Stand to sit: Stand by assistance  Transfer Comments: From EOB, to chair, no device. Pt required cues for safety awareness, proper positioning, initiation/sequencing throughout.      Cognition  Overall Cognitive Status: WFL     Sensation  Overall Sensation Status: WFL(pt denies numbness/tingling)      LUE AROM (degrees)  LUE AROM : WFL  Left Hand AROM (degrees)  Left Hand AROM: WFL  RUE AROM (degrees)  RUE AROM : WFL  Right Hand AROM (degrees)  Right Hand AROM: WFL  LUE Strength  Gross LUE Strength: WFL  RUE Strength  Gross RUE Strength: WFL     Plan   Plan  Times per week: 5-6x/wk  Times per day: Daily  Current Treatment Recommendations: Strengthening, Endurance Training, Patient/Caregiver Education & Training, Equipment Evaluation, Education, & procurement, Self-Care / ADL, Balance Training, Home Management Training, Functional Mobility Training, Safety Education & Training    AM-PAC Score  AM-PAC Inpatient Daily Activity Raw Score: 19 (01/30/21 1315)  AM-PAC Inpatient ADL T-Scale Score : 40.22 (01/30/21 1315)  ADL Inpatient CMS 0-100% Score: 42.8 (01/30/21 1315)  ADL Inpatient CMS G-Code Modifier : CK (01/30/21 1315)    Goals  Short term goals  Time Frame for Short term goals: 14 visits  Short term goal 1: Pt will independently demo good safety awareness throughout fucntional tasks  Short term goal 2: Pt will perform functional transfers/mobility modified independent with use of LRD  Short term goal 3: Pt will perform ADLs modified independent with use of AE / DME PRN  Short term goal 4: Pt will demo 15+ minutes activity tolerance for increased ADL participation     Therapy Time   Individual Concurrent Group Co-treatment   Time In 1344         Time Out 1303         Minutes 28      Co-treat with PT   Timed Code Treatment Minutes: 10 Minutes     Lupillo Gamboa OTR/L

## 2021-01-30 NOTE — PROGRESS NOTES
RN asking for sleep aid tonight from Diane Pandey NP oncall. New order received for melatonin as entered.

## 2021-01-30 NOTE — PROGRESS NOTES
[facility]  Office: [address], DO, Jeremiah Keys, DO, Tashachi Parsons, DO, Amy Luther Blood, DO, Dorene Loving MD, Gisela Kaye MD, Juju Mcgee MD, Garret Plaza MD, Audelia Martinez MD, Tiana Rodriguez MD, Mercedes Rodriguez MD, Vero Wynne MD, Artemio Diaz MD, Radha Dhaliwal, DO, Fidencio Worrell MD, Shahla Colin MD, Suzi Fischer DO, Alton Urias MD,  Roopa Ogden, DO, Porfirio Lock MD, Jeramie Mendez MD, Geoffrey Palma CNP, [address], [facility], Christina Varela CNP, Leoncio Castellon, CNS, Charles Cardenas, CNP, Josie Harrison, CNP, Lisa Clancy, CNP, Fili Witt, CNP, Carlie Montano CNP, Clara Rai PA-C, Adrienne Franklin, [facility], Michele Thomas, CNP, Prisca Kerr, CNP, Yumiko Gomez, CNP, Jose Shankar, CNP, Colin D[id number]    Progress Note    1/30/2021    11:57 AM    Name:   Ead Zendejas  MRN:     [id number]     Acct:      [de-identified]   Room:   [address] [place] [address] Day:  2  Admit Date:  1/28/2021  7:45 AM    PCP:   Shante Pierce MD  Code Status:  Full Code    Subjective:     C/C:   Chief Complaint   Patient presents with    Fatigue     feeling run down x 3 days.  Cough     clear sputum with cough and nasal congestion    Arm Pain     left arm pain      Interval History Status:  Slightly improved. She continues to feel nauseated. She reports taking fluids pretty well but not really eating much. She states her biggest concern is not being able to sleep . She says she feels like she hasnt slept since she got here. She denies fever, chills, CP, or SOB. I expressed concerns about pneumonia and her need for oxygen. She states understanding but cont to be reluctant. Brief History:     Per previous documentation    Eda Zendejas is a 79 y.o.  Non-/non  female who presents with Fatigue (feeling run down x 3 days.), Cough (clear sputum with cough and nasal congestion), and Arm Pain (left arm pain )   and is admitted to the hospital for the management of Acute cystitis with hematuria. According to the ER note the patient presented with complaints of shakiness, fatigue x3 days, nausea and a cough with clear sputum production.  She denied chest pain or shortness of breath but apparently did complain of left arm pain.  During my assessment she complained of shivering, low back pain, urinary frequency, urgency and burning.  When I asked her about the complaints in the ER she denied fatigue, nausea, and left arm pain.  She was also unclear about her home medications but she could tell me she gets her meds filled at [facility] in [facility].  According to her problem was the patient does have a history of dementia and her  is not at bedside to confirm any information.      Vitals on arrival to the .1, 18, 105, 95/50.  91% on 2 L nasal cannula.  Chest x-ray shows no acute cardiopulmonary pathology.  Potassium 3.4, BUN/creatinine 22/2.15, mag 1.4.  Initial lactate 3.3 after fluids 1.6.  Initial troponin 30 2 repeat troponin was 45.  Cardiology was consulted BNP 2308.  Echo ordered for tomorrow.  H&H 10.7/33.0 white count normal at 4.5.  Urine shows positive nitrates, large leukocyte Estrace,  WBCs, 5-10 RBCs, many bacteria, moderate amount of urine hemoglobin.  Urine culture pending.  Blood cultures x2 pending.  Patient's received fluid boluses in the ER and has been started on Rocephin 1 g IV daily pending urine cultures.    She has mild bilateral CVA tenderness on assessment.  Plan to repeat troponin, hydrate and replace electrolytes.      Patient has underlying dementia, wants to go home,  Chest x-ray done this morning shows pulmonary edema and BNP was 2300, mild elevated troponin, cardiology saw the patient, will slow down the fluid resuscitation,  Acute kidney injury, will get nephrology on board,  Blood cultures growing ESCHERICHIA COLI THIS ORGANISM IS AN EXTENDED-SPECTRUM BETA-LACTAMASE . ATB changed to Meropenum and ID was consulted. Patient requires a lot of encouragement to get oob. RT aware of need for IS and pulmonary toileting. New order for PT/OT. Review of Systems:     Review of Systems   Constitutional: Positive for fatigue. Negative for chills, diaphoresis and fever. HENT: Negative for congestion. Eyes: Negative for visual disturbance. Respiratory: Negative for cough, chest tightness, shortness of breath and wheezing. Cardiovascular: Negative for chest pain, palpitations and leg swelling. Gastrointestinal: Positive for nausea. Negative for abdominal pain, blood in stool, constipation, diarrhea and vomiting. Genitourinary: Negative for difficulty urinating. Neurological: Negative for dizziness, weakness, light-headedness, numbness and headaches. All other systems reviewed and are negative. Medications: Allergies:     Allergies   Allergen Reactions    Morphine Nausea And Vomiting    Doxycycline Other (See Comments)     Other reaction(s): diarrhea.stomach pain      Azithromycin Other (See Comments)    Ciprofloxacin Other (See Comments)       Current Meds:   Scheduled Meds:    meropenem  500 mg Intravenous Q12H    pantoprazole  40 mg Oral Daily    atorvastatin  10 mg Oral Daily    aspirin  81 mg Oral Daily    sodium chloride flush  10 mL Intravenous 2 times per day    heparin (porcine)  5,000 Units Subcutaneous 3 times per day     Continuous Infusions:    sodium chloride 50 mL/hr at 01/30/21 0931     PRN Meds: dextromethorphan-guaiFENesin, HYDROcodone 5 mg - acetaminophen, melatonin, sodium chloride flush, acetaminophen **OR** acetaminophen, polyethylene glycol, ondansetron **OR** ondansetron, magnesium sulfate, potassium chloride **OR** potassium alternative oral replacement **OR** potassium chloride    Data:     Past Medical History:   has a past medical history of Accelerated hypertension, Atypical chest pain, Hyperlipidemia, Hypertension, and Labile blood pressure. Social History:   reports that she has quit smoking. She has never used smokeless tobacco. She reports previous alcohol use. She reports that she does not use drugs. Family History:   Family History   Problem Relation Age of Onset    Heart Disease Mother 78    Alzheimer's Disease Father 67    Heart Attack Brother 62    Heart Disease Brother     Heart Disease Brother        Vitals:  BP (!) 159/74   Pulse 103   Temp 99 °F (37.2 °C) (Oral)   Resp 20   Ht 5' 1\" (1.549 m)   Wt 136 lb 3.2 oz (61.8 kg)   SpO2 92%   BMI 25.73 kg/m²   Temp (24hrs), Av.9 °F (37.2 °C), Min:98.6 °F (37 °C), Max:99.3 °F (37.4 °C)    No results for input(s): POCGLU in the last 72 hours. I/O (24Hr):     Intake/Output Summary (Last 24 hours) at 2021 1157  Last data filed at 2021 0958  Gross per 24 hour   Intake 2473 ml   Output 1500 ml   Net 973 ml       Labs:  Hematology:  Recent Labs     21  0836 21  0536 21  0604   WBC 4.5 14.5* 12.4*   RBC 3.58* 3.50* 3.54*   HGB 10.7* 10.3* 10.4*   HCT 33.0* 32.1* 32.2*   MCV 92.0 91.8 91.1   MCH 29.8 29.3 29.5   MCHC 32.4 32.0 32.3   RDW 13.8 14.0 14.3    102* 88*   MPV 9.7 9.8 10.2   INR  --  1.1  --      Chemistry:  Recent Labs     21  0836 21  1039 21  1517 21  2053 21  0536 21  1013 21  1723 21  2304 21  0604     --   --  137 138  --   --   --  136   K 3.4*  --   --  4.4 4.2  --   --   --  3.9     --   --  106 110*  --   --   --  108*   CO2 22  --   --  22 21  --   --   --  20   GLUCOSE 94  --   --  100* 91  --   --   --  94   BUN 22  --   --  25* 24*  --   --   --  18   CREATININE 2.15*  --   --  2.12* 1.79*  --   --   --  1.18*   MG  --  1.4*  --   --  2.3  --   --   --  1.9   ANIONGAP 11  --   --  9 7*  --   --   --  8*   LABGLOM 22*  --   --  22* 27*  --   --   --  44*   GFRAA 27*  --   --  27* 33*  --   --   --  53* CALCIUM 8.0*  --   --  8.1* 8.0*  --   --   --  8.5*   PROBNP 2,308*  --   --   --   --   --   --   --   --    TROPHS 32* 45* 45*  --   --  26* 25* 21*  --    MYOGLOBIN  --   --  80*  --   --   --   --   --   --      Recent Labs     01/29/21  0536   PROT 4.8*     ABG:No results found for: POCPH, PHART, PH, POCPCO2, FWK5LGZ, PCO2, POCPO2, PO2ART, PO2, POCHCO3, JEB2GAQ, HCO3, NBEA, PBEA, BEART, BE, THGBART, THB, BRS0FBA, AEKT5WWE, I7TTOVSJ, O2SAT, FIO2  Lab Results   Component Value Date/Time    SPECIAL NOT REPORTED 01/28/2021 09:42 AM     Lab Results   Component Value Date/Time    CULTURE (A) 01/28/2021 09:42 AM     ESCHERICHIA COLI >[id number] CFU/ML THIS ORGANISM IS AN EXTENDED-SPECTRUM BETA-LACTAMASE  AND RESISTANCE TO THERAPY WITH PENICILLINS, CEPHALOSPORINS AND AZTREONAM IS EXPECTED. THESE ORGANISMS GENERALLY REMAIN SUSCEPTIBLE TO CARBAPENEMS. CONSIDER ID CONSULTATION. Radiology:  Xr Chest Portable    Result Date: 1/29/2021  Interval enlargement of cardiac silhouette and interval development of interstitial opacities with bilateral effusions consistent with pulmonary edema. Xr Chest Portable    Result Date: 1/28/2021  No acute cardiopulmonary pathology. Us Retroperitoneal Complete    Result Date: 1/28/2021  Normal sonographic appearance of the kidneys. Physical Examination:     Physical Exam  Vitals signs and nursing note reviewed. Constitutional:       Appearance: She is ill-appearing. HENT:      Mouth/Throat:      Mouth: Mucous membranes are dry. Eyes:      Extraocular Movements: Extraocular movements intact. Cardiovascular:      Rate and Rhythm: Normal rate and regular rhythm. Pulses: Normal pulses. Heart sounds: Normal heart sounds. Pulmonary:      Effort: Pulmonary effort is normal.      Breath sounds: Examination of the right-middle field reveals decreased breath sounds. Examination of the left-middle field reveals decreased breath sounds.  Examination of the chest x-ray showed pulmonary edema, echo shows some degree of dystolic dysfunction. IV fluids decreased  4. Underlying dementia, without behavioral changes,  5. Acute kidney injury presented with creatinine of 2.15, baseline 0.7, Nephrology following  6. Full CODE STATUS  7. Start iron supplementation when nausea resolves  8.  DVT prophylaxis    RIC Munoz - CNP  1/30/2021  11:57 AM

## 2021-01-30 NOTE — PROGRESS NOTES
Physical Therapy    Facility/Department: [facility] SURG ICU  Initial Assessment    NAME: Yong Keane  : 1940  MRN: [id number]  Chief Complaint   Patient presents with    Fatigue     feeling run down x 3 days.  Cough     clear sputum with cough and nasal congestion    Arm Pain     left arm pain      Date of Service: 2021    Discharge Recommendations:  Home independently   PT Equipment Recommendations  Equipment Needed: No    Assessment   Body structures, Functions, Activity limitations: Decreased endurance  Assessment: Pt seen for evaluation secondary to nursing detecting unsteady gait and fatigue. Pt is SBA for all functional mobility at this time. steps TBA next visit. Ongoing assessment at this time for need for therapy after d/c. Prognosis: Excellent  Decision Making: Medium Complexity  REQUIRES PT FOLLOW UP: Yes  Activity Tolerance  Activity Tolerance: Patient Tolerated treatment well       Patient Diagnosis(es): The primary encounter diagnosis was Acute UTI. A diagnosis of Septicemia ([facility] [address] was also pertinent to this visit. has a past medical history of Accelerated hypertension, Atypical chest pain, Hyperlipidemia, Hypertension, and Labile blood pressure. has a past surgical history that includes Hysterectomy; Abdomen surgery; LIGATION OF SAPHENOUS VEIN; and bladder suspension.     Restrictions  Restrictions/Precautions  Restrictions/Precautions: Fall Risk, Contact Precautions  Required Braces or Orthoses?: No  Vision/Hearing  Vision: Impaired  Vision Exceptions: Wears glasses at all times  Hearing: Within functional limits     Subjective  General  Chart Reviewed: Yes  Patient assessed for rehabilitation services?: Yes  Response To Previous Treatment: Not applicable  Family / Caregiver Present: Yes(spouse present)  Follows Commands: Within Functional Limits  Subjective  Subjective: Pt supine in bed.  nursing and pt agreeable to PT evaluation  Pain Screening  Patient Currently

## 2021-01-31 LAB
ANION GAP SERPL CALCULATED.3IONS-SCNC: 9 MMOL/L (ref 9–17)
BUN BLDV-MCNC: 17 MG/DL (ref 8–23)
BUN/CREAT BLD: ABNORMAL (ref 9–20)
CALCIUM SERPL-MCNC: 9 MG/DL (ref 8.6–10.4)
CHLORIDE BLD-SCNC: 103 MMOL/L (ref 98–107)
CO2: 22 MMOL/L (ref 20–31)
CREAT SERPL-MCNC: 0.97 MG/DL (ref 0.5–0.9)
GFR AFRICAN AMERICAN: >60 ML/MIN
GFR NON-AFRICAN AMERICAN: 55 ML/MIN
GFR SERPL CREATININE-BSD FRML MDRD: ABNORMAL ML/MIN/{1.73_M2}
GFR SERPL CREATININE-BSD FRML MDRD: ABNORMAL ML/MIN/{1.73_M2}
GLUCOSE BLD-MCNC: 75 MG/DL (ref 70–99)
HCT VFR BLD CALC: 35.4 % (ref 36–46)
HEMOGLOBIN: 11.4 G/DL (ref 12–16)
MCH RBC QN AUTO: 29.5 PG (ref 26–34)
MCHC RBC AUTO-ENTMCNC: 32.4 G/DL (ref 31–37)
MCV RBC AUTO: 91.1 FL (ref 80–100)
NRBC AUTOMATED: ABNORMAL PER 100 WBC
PDW BLD-RTO: 14.6 % (ref 12.5–15.4)
PLATELET # BLD: 94 K/UL (ref 140–450)
PMV BLD AUTO: 10.2 FL (ref 6–12)
POTASSIUM SERPL-SCNC: 3.6 MMOL/L (ref 3.7–5.3)
RBC # BLD: 3.88 M/UL (ref 4–5.2)
SODIUM BLD-SCNC: 134 MMOL/L (ref 135–144)
WBC # BLD: 10.2 K/UL (ref 3.5–11)

## 2021-01-31 PROCEDURE — 80048 BASIC METABOLIC PNL TOTAL CA: CPT

## 2021-01-31 PROCEDURE — 6370000000 HC RX 637 (ALT 250 FOR IP): Performed by: NURSE PRACTITIONER

## 2021-01-31 PROCEDURE — 2580000003 HC RX 258: Performed by: NURSE PRACTITIONER

## 2021-01-31 PROCEDURE — 94761 N-INVAS EAR/PLS OXIMETRY MLT: CPT

## 2021-01-31 PROCEDURE — 99222 1ST HOSP IP/OBS MODERATE 55: CPT | Performed by: INTERNAL MEDICINE

## 2021-01-31 PROCEDURE — APPSS60 APP SPLIT SHARED TIME 46-60 MINUTES: Performed by: NURSE PRACTITIONER

## 2021-01-31 PROCEDURE — 85027 COMPLETE CBC AUTOMATED: CPT

## 2021-01-31 PROCEDURE — 2700000000 HC OXYGEN THERAPY PER DAY

## 2021-01-31 PROCEDURE — 86022 PLATELET ANTIBODIES: CPT

## 2021-01-31 PROCEDURE — 99232 SBSQ HOSP IP/OBS MODERATE 35: CPT | Performed by: INTERNAL MEDICINE

## 2021-01-31 PROCEDURE — 6360000002 HC RX W HCPCS: Performed by: NURSE PRACTITIONER

## 2021-01-31 PROCEDURE — 1210000000 HC MED SURG R&B

## 2021-01-31 PROCEDURE — 36415 COLL VENOUS BLD VENIPUNCTURE: CPT

## 2021-01-31 RX ADMIN — HEPARIN SODIUM 5000 UNITS: 5000 INJECTION INTRAVENOUS; SUBCUTANEOUS at 13:15

## 2021-01-31 RX ADMIN — ONDANSETRON 4 MG: 4 TABLET, ORALLY DISINTEGRATING ORAL at 18:33

## 2021-01-31 RX ADMIN — HYDROCODONE BITARTRATE AND ACETAMINOPHEN 1 TABLET: 5; 325 TABLET ORAL at 06:03

## 2021-01-31 RX ADMIN — POLYETHYLENE GLYCOL 3350 17 G: 17 POWDER, FOR SOLUTION ORAL at 08:22

## 2021-01-31 RX ADMIN — HEPARIN SODIUM 5000 UNITS: 5000 INJECTION INTRAVENOUS; SUBCUTANEOUS at 21:32

## 2021-01-31 RX ADMIN — GUAIFENESIN 600 MG: 600 TABLET, EXTENDED RELEASE ORAL at 21:34

## 2021-01-31 RX ADMIN — ONDANSETRON 4 MG: 2 INJECTION INTRAMUSCULAR; INTRAVENOUS at 08:22

## 2021-01-31 RX ADMIN — DEXTROMETHORPHAN HYDROBROMIDE AND GUAIFENESIN 10 ML: 20; 200 LIQUID ORAL at 13:15

## 2021-01-31 RX ADMIN — HYDROCODONE BITARTRATE AND ACETAMINOPHEN 1 TABLET: 5; 325 TABLET ORAL at 13:15

## 2021-01-31 RX ADMIN — PANTOPRAZOLE SODIUM 40 MG: 40 TABLET, DELAYED RELEASE ORAL at 08:22

## 2021-01-31 RX ADMIN — MEROPENEM 500 MG: 500 INJECTION, POWDER, FOR SOLUTION INTRAVENOUS at 15:00

## 2021-01-31 RX ADMIN — MEROPENEM 500 MG: 500 INJECTION, POWDER, FOR SOLUTION INTRAVENOUS at 03:33

## 2021-01-31 RX ADMIN — SODIUM CHLORIDE, PRESERVATIVE FREE 10 ML: 5 INJECTION INTRAVENOUS at 08:22

## 2021-01-31 RX ADMIN — ASPIRIN 81 MG: 81 TABLET, CHEWABLE ORAL at 08:22

## 2021-01-31 RX ADMIN — DEXTROMETHORPHAN HYDROBROMIDE AND GUAIFENESIN 10 ML: 20; 200 LIQUID ORAL at 06:03

## 2021-01-31 RX ADMIN — HYDROCODONE BITARTRATE AND ACETAMINOPHEN 1 TABLET: 5; 325 TABLET ORAL at 18:33

## 2021-01-31 RX ADMIN — GUAIFENESIN 600 MG: 600 TABLET, EXTENDED RELEASE ORAL at 08:22

## 2021-01-31 ASSESSMENT — ENCOUNTER SYMPTOMS
CHEST TIGHTNESS: 0
BLOOD IN STOOL: 0
VOMITING: 0
NAUSEA: 1
ABDOMINAL PAIN: 0
SHORTNESS OF BREATH: 0
COUGH: 1
CONSTIPATION: 0
DIARRHEA: 0
WHEEZING: 0

## 2021-01-31 ASSESSMENT — PAIN SCALES - GENERAL
PAINLEVEL_OUTOF10: 8
PAINLEVEL_OUTOF10: 6
PAINLEVEL_OUTOF10: 2

## 2021-01-31 ASSESSMENT — PAIN DESCRIPTION - LOCATION
LOCATION: GENERALIZED;HEAD
LOCATION: GENERALIZED

## 2021-01-31 ASSESSMENT — PAIN DESCRIPTION - DESCRIPTORS
DESCRIPTORS: ACHING
DESCRIPTORS: ACHING

## 2021-01-31 ASSESSMENT — PAIN DESCRIPTION - FREQUENCY: FREQUENCY: CONTINUOUS

## 2021-01-31 ASSESSMENT — PAIN DESCRIPTION - PROGRESSION
CLINICAL_PROGRESSION: NOT CHANGED

## 2021-01-31 ASSESSMENT — PAIN DESCRIPTION - PAIN TYPE: TYPE: ACUTE PAIN

## 2021-01-31 NOTE — CONSULTS
[facility]  Initial Consult Note  Date: 1/31/2021    Hospital day :3     Impression:   1. ESBL E. coli urinary tract infection and sepsis  2. Acute kidney injury-resolved  3. Possible congestive heart failure by imaging  4. Dementia    Recommendations   · Continue intravenous antimicrobial therapy with meropenem. · The patient will require 10 to 14 days of IV antimicrobial therapy. · Depending on where she goes on discharge the patient will either need to continue meropenem [if he is going to an ECF] OR will need to be switched to meropenem [if she is going home]  · We will follow progress and adjust therapy accordingly    Chief complaint/reason for consultation:   ESBL E. coli UTI and sepsis    History of Present Illness:   Jessica Salmeron is a 79y.o.-year-old female who was initially admitted on 1/28/2021. Nikki Palomo has a history of hypertension, hyperlipidemia, atypical chest pain and she reports that she was in her usual state of health until the day of admission when she woke up with low back pain, dysuria and reports that she \"did not feel good\". She has some generalized malaise and came into the emergency department where she was diagnosed with a urinary tract infection. She was initially started on antimicrobial therapy with Rocephin and blood cultures did also grow gram-negative rods which have all later been identified as an ESBL E. coli. The patient's antimicrobial therapy was switched to meropenem and I was asked to evaluate and help with antibiotic choice. The patient currently does not report any subjective fevers or chills. She reports some chest and nasal congestion. She reports some generalized fatigue/weakness. She reports some nausea but no abdominal pain vomiting or diarrhea. The dysuria is resolved.     I have personally reviewed the past medical history, past surgical history, medications, social history, and family history, and I have updated the database accordingly.   Past Medical History:     Past Medical History:   Diagnosis Date    Accelerated hypertension     Atypical chest pain     Hyperlipidemia     Hypertension     Labile blood pressure      Past Surgical  History:     Past Surgical History:   Procedure Laterality Date    ABDOMEN SURGERY      colon resection    BLADDER SUSPENSION      HYSTERECTOMY      LIGATION OF SAPHENOUS VEIN       Medications:      meropenem  500 mg Intravenous Q12H    guaiFENesin  600 mg Oral BID    pantoprazole  40 mg Oral Daily    [Held by provider] atorvastatin  10 mg Oral Daily    aspirin  81 mg Oral Daily    sodium chloride flush  10 mL Intravenous 2 times per day    [Held by provider] heparin (porcine)  5,000 Units Subcutaneous 3 times per day     Social History:     Social History     Socioeconomic History    Marital status:      Spouse name: Not on file    Number of children: Not on file    Years of education: Not on file    Highest education level: Not on file   Occupational History    Not on file   Social Needs    Financial resource strain: Not on file    Food insecurity     Worry: Not on file     Inability: Not on file    Transportation needs     Medical: Not on file     Non-medical: Not on file   Tobacco Use    Smoking status: Former Smoker    Smokeless tobacco: Never Used    Tobacco comment: quit 50 years ago   Substance and Sexual Activity    Alcohol use: Not Currently     Comment: rare wine    Drug use: Never    Sexual activity: Not on file   Lifestyle    Physical activity     Days per week: Not on file     Minutes per session: Not on file    Stress: Not on file   Relationships    Social connections     Talks on phone: Not on file     Gets together: Not on file     Attends [employer] service: Not on file     Active member of club or organization: Not on file     Attends meetings of clubs or organizations: Not on file     Relationship status: Not on file    Intimate partner violence gallops. Abdomen: Soft, nontender, nondistended. Extremities: No cyanosis, clubbing, edema, or effusions. Neurologic: No gross sensory or motor deficits. Skin: Warm and dry with no rash. Medical Decision Making:   I have independently reviewed/ordered the following labs:  CBC with Differential:   Recent Labs     01/28/21  0836 01/28/21  0836 01/30/21  0604 01/31/21  0623   WBC 4.5   < > 12.4* 10.2   HGB 10.7*   < > 10.4* 11.4*   HCT 33.0*   < > 32.2* 35.4*      < > 88* 94*   LYMPHOPCT 2*  --   --   --    MONOPCT 0*  --   --   --     < > = values in this interval not displayed. BMP:   Recent Labs     01/29/21  0536 01/30/21  0604 01/31/21  0623    136 134*   K 4.2 3.9 3.6*   * 108* 103   CO2 21 20 22   BUN 24* 18 17   CREATININE 1.79* 1.18* 0.97*   MG 2.3 1.9  --      Hepatic Function Panel:   Recent Labs     01/29/21  0536   PROT 4.8*       Lab Results   Component Value Date    PROCAL 38.81 01/30/2021    PROCAL 38.02 01/28/2021       No results found for: CRP  Lab Results   Component Value Date    FERRITIN 302 01/29/2021     No results found for: FIBRINOGEN  Lab Results   Component Value Date    DDIMER 0.62 01/08/2020    DDIMER 0.58 12/15/2019     No results found for: LDH    No results found for: SEDRATE    Lab Results   Component Value Date    COVID19 Not Detected 01/29/2021    COVID19 Not Detected 01/28/2021     No results found for requested labs within last 30 days. Imaging Studies:   Echo Complete 2d W Doppler W Color Result Date: 1/29/2021  CONCLUSIONS   Summary Normal left ventricle size and function with an estimated EF > 55%. No segmental wall motion abnormalities seen. Mild left ventricular hypertrophy. Thickened mitral valve leaflets. Mitral annular calcification is seen. Mild mitral regurgitation. Normal tricuspid valve structure and function. Trivial to mild tricuspid regurgitation. Estimated right ventricular systolic pressure is 09.96 mmHg.  Mild pulmonary hypertension. Normal aortic root dimension. IVC Increased diameter and impaired or no inspiratory variation indicating elevated RA filling pressure (i.e. CVP) . E/E' average = 9.25. Signature ----------------------------------------------------------------------------  Electronically signed by Donna Ivy(Sonographer) on 01/29/2021 11:20 AM ---------------------------------------------------------------------------- ----------------------------------------------------------------------------  Electronically signed by Joe Quiroga(Interpreting physician) on 01/29/2021  11:25 AM ---------------------------------------------------------------------------- FINDINGS Left Atrium Left atrium is normal in size. Inter-atrial septum appears so be intact. Left Ventricle Normal left ventricle size and function with an estimated EF > 55%. No segmental wall motion abnormalities seen. Mild left ventricular hypertrophy. Right Atrium Right atrium is normal in size. Right Ventricle Normal right ventricular size and function. Mitral Valve Thickened mitral valve leaflets. Mitral annular calcification is seen. Mild mitral regurgitation. No mitral stenosis. Aortic Valve There is focal calcification of the non-coronary cusp. Aortic valve is trileaflet. No aortic stenosis. No aortic insufficiency. Tricuspid Valve Normal tricuspid valve structure and function. Trivial to mild tricuspid regurgitation. Estimated right ventricular systolic pressure is 03.17 mmHg. Mild pulmonary hypertension. No tricuspid stenosis. Pulmonic Valve Pulmonic valve not well visualized but Doppler velocities are normal. Pericardial Effusion Anterior echo free space suggestive of fat pad. Miscellaneous Normal aortic root dimension. IVC Increased diameter and impaired or no inspiratory variation indicating elevated RA filling pressure (i.e. CVP) .  E/E' average = 9.25. M-mode / 2D Measurements & Calculations:   LVIDd:4 cm(3.7 - 5.6 cm)          Diastolic YYOZKH:16.3 ml  LVIDs:2.9 cm(2.2 - 4.0 cm)        Systolic IUPTWU:33.5 ml  SDFT:4.8 cm(0.6 - 1.1 cm)         Aortic Root:2.8 cm(2.0 - 3.7 cm)  LVPWd:1.1 cm(0.6 - 1.1 cm)        LA Dimension: 3 cm(1.9 - 4.0 cm)  Fractional Shortenin.5 %      LA volume/Index: 49.9 ml /32m^2  Calculated LVEF (%): 63.51 %      LVOT:1.9 cm                                    RVDd:3.6 cm   Mitral:                                  Aortic   Valve Area (P1/2-Time): 4.89 cm^2        Peak Velocity: 1.74 m/s  Peak E-Wave: 1.03 m/s                    Mean Velocity: 1.14 m/s  Peak A-Wave: 1.35 m/s                    Peak Gradient: 12.11 mmHg  E/A Ratio: 0.76                          Mean Gradient: 6 mmHg  Peak Gradient: 4.24 mmHg  Deceleration Time: 153 msec  P1/2t: 45 msec                           Area (continuity): 2.04 cm^2                                           AV VTI: 36.7 cm   Tricuspid:                               Pulmonic:   Peak TR Velocity: 2.63 m/s               Peak Velocity: 1.16 m/s  Peak TR Gradient: 27.6676 mmHg           Peak Gradient: 5.38 mmHg  Estimated RA Pressure: 12 mmHg  Peak E-Wave: 0.82 m/s  Peak Gradient: 2.69 mmHg                                           Estimated PASP: 39.67 mmHg  Septal Wall E' velocity:0.10 m/s Lateral Wall E' velocity:0.13 m/s      ONE XRAY VIEW OF THE CHEST 2021 8:38 am   FINDINGS:   Interval enlargement of cardiac silhouette and interval development of interstitial opacities with bilateral effusions consistent with pulmonary edema. ULTRASOUND OF THE RETROPERITONEUM 2021 12:24 pm  FINDINGS:   Normal sonographic appearance of the kidneys. Cultures:     Culture, Blood 1 [[id number]] (Abnormal) Collected: 21 0836   Order Status: Completed Specimen: Blood Updated: 21 1017    Specimen Description . BLOOD    Special Requests 20 ML RIGHT ARM    Culture POSITIVE Blood Culture Results called to and read back by: BAYLEE Simpson AT 0255 ON 21Abnormal      DIRECT GRAM STAIN FROM BOTTLE: GRAM NEGATIVE RODS     ESCHERICHIA COLI THIS ORGANISM IS AN EXTENDED-SPECTRUM BETA-LACTAMASE  AND RESISTANCE TO THERAPY WITH PENICILLINS, CEPHALOSPORINS AND AZTREONAM IS EXPECTED.  THESE ORGANISMS GENERALLY REMAIN SUSCEPTIBLE TO CARBAPENEMS.  CONSIDER ID CONSULTATION. For susceptibility, refer to previous culture. Abnormal    Culture, Blood 1 [[id number]] (Abnormal)  Collected: 01/28/21 0855   Order Status: Completed Specimen: Blood Updated: 01/30/21 1016    Specimen Description . BLOOD    Special Requests 3ML RIGHT HAND    Culture POSITIVE Blood Culture Results called to and read back by: Randy Oviedo. AT 0201 ON 01 29 2021Abnormal      DIRECT GRAM STAIN FROM BOTTLE: GRAM NEGATIVE RODS     ESCHERICHIA COLI THIS ORGANISM IS AN EXTENDED-SPECTRUM BETA-LACTAMASE  AND RESISTANCE TO THERAPY WITH PENICILLINS, CEPHALOSPORINS AND AZTREONAM IS EXPECTED.  THESE ORGANISMS GENERALLY REMAIN SUSCEPTIBLE TO CARBAPENEMS.  CONSIDER ID CONSULTATION. Abnormal    Escherichia coli (3)    Antibiotic Interpretation WALLY Status    amikacin Sensitive  Final     <=2   SUSCEPTIBLE   ampicillin Resistant  Final     >=32   RESISTANT   ampicillin-sulbactam   Final     NOT REPORTED    aztreonam Resistant  Final     16   RESISTANT   ceFAZolin Resistant  Final     >=64   RESISTANT   cefepime Resistant  Final     2   RESISTANT   cefTRIAXone Resistant  Final     >=64   RESISTANT   ciprofloxacin Resistant  Final     >=4   RESISTANT   ertapenem   Final     NOT REPORTED    Confirmatory Extended Spectrum Beta-Lactamase Positive POSITIVE Final    gentamicin Resistant  Final     >=16   RESISTANT   meropenem Sensitive  Final     <=0.25   SUSCEPTIBLE   nitrofurantoin   Final     NOT REPORTED    tigecycline   Final     NOT REPORTED    tobramycin Intermediate  Final     8   INTERMEDIATE   trimethoprim-sulfamethoxazole Resistant  Final     >=320   RESISTANT   piperacillin-tazobactam Resistant  Final     <=4   RESISTANT     Culture, Urine [[id number]] (Abnormal)  Collected: 01/28/21 0942   Order Status: Completed Specimen: Urine Random Updated: 01/29/21 7457    Specimen Description . Random Urine    Special Requests NOT REPORTED    Culture ESCHERICHIA COLI >[id number] CFU/ML THIS ORGANISM IS AN EXTENDED-SPECTRUM BETA-LACTAMASE  AND RESISTANCE TO THERAPY WITH PENICILLINS, CEPHALOSPORINS AND AZTREONAM IS EXPECTED.  THESE ORGANISMS GENERALLY REMAIN SUSCEPTIBLE TO CARBAPENEMS.  CONSIDER ID CONSULTATION. Abnormal    Escherichia coli (1)    Antibiotic Interpretation WALLY Status    amikacin Sensitive  Final     <=2   SUSCEPTIBLE   ampicillin Resistant  Final     >=32   RESISTANT   ampicillin-sulbactam   Final     NOT REPORTED    aztreonam Resistant  Final     16   RESISTANT   ceFAZolin Resistant  Final     >=64   RESISTANT   ceFAZolin Resistant Cefazolin sensitivity results can be used to predict the effectiveness of oral cephalosporins (eg. Cephalexin) in uncomplicated Urinary Tract Infections due to E. coli, K. pneumoniae, and P. mirabilis Final    cefepime Resistant  Final     2   RESISTANT   cefTRIAXone Resistant  Final     >=64   RESISTANT   ciprofloxacin Resistant  Final     >=4   RESISTANT   ertapenem   Final     NOT REPORTED    Confirmatory Extended Spectrum Beta-Lactamase Positive POSITIVE Final    gentamicin Resistant  Final     >=16   RESISTANT   meropenem Sensitive  Final     <=0.25   SUSCEPTIBLE   nitrofurantoin Sensitive  Final     <=16   SUSCEPTIBLE   tigecycline   Final     NOT REPORTED    tobramycin Intermediate  Final     8   INTERMEDIATE   trimethoprim-sulfamethoxazole Resistant  Final     >=320   RESISTANT   piperacillin-tazobactam Resistant  Final     <=4   RESISTANT     Respiratory Panel, Molecular, with COVID-19 (Restricted: peds pts or suitable admitted adults) [[id number]] Collected: 01/29/21 0940   Order Status: Completed Specimen: Nasopharyngeal Swab Updated: 01/29/21 2027    Specimen Description . NASOPHARYNGEAL SWAB Adenovirus PCR Not Detected    Coronavirus 229E PCR Not Detected    Coronavirus HKU1 PCR Not Detected    Coronavirus NL63 PCR Not Detected    Coronavirus OC43 PCR Not Detected    SARS-CoV-2, PCR Not Detected    Comment: This test has been authorized by the FDA under an Emergency Use Authorization (EUA) for use   by authorized laboratories. This test is only authorized for the duration of the time of declaration that circumstances   exist justifying the authorization of the emergency use of in vitro diagnostic tests for   detection of the SARS-CoV-2 virusand/or diagnosis of COVID-19 infection under section 564   (b)(1) of the Act,21 U.S.C.bbb-1(b)(1), unless the authorization is terminated or revoked   sooner.         Patient Fact Sheet:   Trey         Provider Fact Sheet:   Trey         METHODOLGY: Multiplex PCR        Human Metapneumovirus PCR Not Detected    Rhino/Enterovirus PCR Not Detected    Influenza A by PCR Not Detected    Influenza A H1 PCR NOT REPORTED    Influenza A H1 (2009) PCR NOT REPORTED    Influenza A H3 PCR NOT REPORTED    Influenza B by PCR Not Detected    Parainfluenza 1 PCR Not Detected    Parainfluenza 2 PCR Not Detected    Parainfluenza 3 PCR Not Detected    Parainfluenza 4 PCR Not Detected    Resp Syncytial Virus PCR Not Detected    Bordetella Parapertussis Not Detected    B Pertussis by PCR Not Detected    Chlamydia pneumoniae By PCR Not Detected    Mycoplasma pneumo by PCR Not Detected    Comment: Performed by multiplexed nucleic acid assay. Thank you for allowing us to participate in the care of this patient. Please call with questions.     Electronically signed by Sourav Fried MD on 1/31/2021 at 8:06 AM      [facility]  [address] messaging  OFFICE: [phone]      This note is created with the assistance of a speech recognition program. While intending to generate a document that actually reflects the content of the visit, the document can still have some errors including those of syntax and sound a like substitutions which may escape proof reading. In such instances, actual meaning can be extrapolated by contextual diversion.

## 2021-01-31 NOTE — PROGRESS NOTES
[facility]   Progress Note                   Date:   1/31/2021  Patient name: Wood Santoyo  Date of admission:  1/28/2021  7:45 AM  MRN:   [id number]  YOB: 1940  PCP: Ron Lantigua MD    Reason for Admission: Pyelonephritis [N12]    Subjective:       Clinical Changes / Abnormalities: no chest pain or dyspnea. Medications:   Scheduled Meds:   meropenem  500 mg Intravenous Q12H    guaiFENesin  600 mg Oral BID    pantoprazole  40 mg Oral Daily    [Held by provider] atorvastatin  10 mg Oral Daily    aspirin  81 mg Oral Daily    sodium chloride flush  10 mL Intravenous 2 times per day    [Held by provider] heparin (porcine)  5,000 Units Subcutaneous 3 times per day     Continuous Infusions:   sodium chloride 50 mL/hr at 01/30/21 2024     CBC:   Recent Labs     01/29/21  0536 01/30/21  0604 01/31/21  0623   WBC 14.5* 12.4* 10.2   HGB 10.3* 10.4* 11.4*   * 88* 94*     BMP:    Recent Labs     01/29/21  0536 01/30/21  0604 01/31/21  0623    136 134*   K 4.2 3.9 3.6*   * 108* 103   CO2 21 20 22   BUN 24* 18 17   CREATININE 1.79* 1.18* 0.97*   GLUCOSE 91 94 75     Hepatic: No results for input(s): AST, ALT, ALB, BILITOT, ALKPHOS in the last 72 hours. Troponin: No results for input(s): TROPONINI in the last 72 hours. BNP: No results for input(s): BNP in the last 72 hours. Lipids: No results for input(s): CHOL, HDL in the last 72 hours. Invalid input(s): LDLCALCU  INR:   Recent Labs     01/29/21  0536   INR 1.1       Objective:   Vitals: /71   Pulse 81   Temp 98.7 °F (37.1 °C) (Oral)   Resp 20   Ht 5' 1\" (1.549 m)   Wt 134 lb 3.2 oz (60.9 kg)   SpO2 96%   BMI 25.36 kg/m²   General appearance: alert and cooperative with exam  HEENT: Head: Normocephalic, no lesions, without obvious abnormality.   Neck: no JVD  Lungs: CTAB  Heart: RRR s1+s2, no murmurs  Abdomen: soft, non-tender  Extremities: no edema  Neurologic: not done    TTE 1/28/21  Summary  Normal left ventricle size and function with an estimated EF > 55%. No segmental wall motion abnormalities seen. Mild left ventricular hypertrophy. Thickened mitral valve leaflets. Mitral annular calcification is seen. Mild mitral regurgitation. Normal tricuspid valve structure and function. Trivial to mild tricuspid regurgitation. Estimated right ventricular systolic pressure is 46.31 mmHg. Mild pulmonary hypertension. Normal aortic root dimension. IVC Increased diameter and impaired or no inspiratory variation indicating  elevated RA filling pressure (i.e. CVP) . E/E' average = 9.25. Assessment / Acute Cardiac Problems:   1. Sepsis  2. UTI  3. MARCOS- resolved  4. HTN  5. Minimal troponin elevation, doubt ACS  6. Preserved LV systolic function on TTE on 1/28/21  7. Dementia    Patient Active Problem List:     Hiatal hernia     Hyperlipidemia     Diverticulitis of colon     Anxiety     Insomnia     Lumbar radiculopathy     Major depression, single episode     Memory problem     Osteoporosis     Primary osteoarthritis     Rosacea     Venous thromboembolism (VTE)     Chronic frontal sinusitis     Accelerated hypertension     Hyponatremia     Atypical chest pain     Labile blood pressure     Acute cystitis with hematuria     Cardiomegaly     Dementia (HCC)     Gastroesophageal reflux disease     Essential hypertension     History of ventricular fibrillation     History of recurrent UTIs     Elevated brain natriuretic peptide (BNP) level     Sepsis (HCC)     Hypomagnesemia     Hypokalemia     Dehydration     MARCOS (acute kidney injury) ([address]     Elevated troponin     Hypoxia     Gram-negative bacteremia     Sepsis due to Escherichia coli with acute renal failure and tubular necrosis without septic shock ([address]      Plan of Treatment:   1. Continue ASA  2. On antibiotics  3. Volume and electrolytes management per nephrology  4. Replace K  5.  Call cardiology with questions, will sign off.    Dotty Hardwick [facility] Cardiology  [phone]

## 2021-01-31 NOTE — PROGRESS NOTES
[facility]  Office: [address], DO, Tino [facility], DO, Shainatanmay Oniel, DO, Lory Miller Blood, DO, Prema Boyd MD, Ramon Flood MD, Peyton Vaz MD, Jenniffer Stewart MD, Victoriano Kidd MD, Eliel Ferrera MD, Lilian Julian MD, Helder Resendiz MD, Artemio Trejo MD, Farida Rader DO, Emani Dunn MD, Gal Weiss MD, Janet Presley, DO, Scot Machuca MD,  Beltran Peterson DO, Jett Hough MD, Kris Lyons MD, Luisa Velazco [facility], [facility], [facility], Edel Herrera, CNP, Bill Mcdowell, CNS, Link Parker, CNP, Terrie Wrokman, CNP, Stephen Haywood, CNP, Layne Hinds, CNP, Boubacar Cui, CNP, Deonna Holguin PA-C, Grecia Ayala, [facility], Myah Ventura, CNP, Laura Elmore, CNP, Sanchez Padilla, CNP, Rudi Lundborg, CNP, Colin Riggins 1732    Progress Note    1/31/2021    12:17 PM    Name:   Wood Santoyo  MRN:     [id number]     Acct:      [de-identified]   Room:   [address] [place] [address] Day:  3  Admit Date:  1/28/2021  7:45 AM    PCP:   Ron Lantigua MD  Code Status:  Full Code    Subjective:     C/C:   Chief Complaint   Patient presents with    Fatigue     feeling run down x 3 days.  Cough     clear sputum with cough and nasal congestion    Arm Pain     left arm pain      Interval History Status:  Slightly improved. She continues to complain of nausea, flulike symptoms, and myalgias. I explained to her again that she came in here pretty sick and it may take a few days before she starts feeling better. She continues to require a lot of encouragement to stay up in the chair, use her incentive, take p.o. I told her today would be a great day for a shower so she can start feeling better. With her  at bedside we discussed possible discharge with IV antibiotics. She feels their son who is a paramedic could help. I told her we would discuss it tomorrow further.      Brief History:     Per previous davon Choe is a 79 y.o. Non-/non  female who presents with Fatigue (feeling run down x 3 days.), Cough (clear sputum with cough and nasal congestion), and Arm Pain (left arm pain )   and is admitted to the hospital for the management of Acute cystitis with hematuria. According to the ER note the patient presented with complaints of shakiness, fatigue x3 days, nausea and a cough with clear sputum production.  She denied chest pain or shortness of breath but apparently did complain of left arm pain.  During my assessment she complained of shivering, low back pain, urinary frequency, urgency and burning.  When I asked her about the complaints in the ER she denied fatigue, nausea, and left arm pain.  She was also unclear about her home medications but she could tell me she gets her meds filled at [place] [facility] [place] [facility]".  According to her problem was the patient does have a history of dementia and her  is not at bedside to confirm any information.      Vitals on arrival to the .1, 18, 105, 95/50.  91% on 2 L nasal cannula.  Chest x-ray shows no acute cardiopulmonary pathology.  Potassium 3.4, BUN/creatinine 22/2.15, mag 1.4.  Initial lactate 3.3 after fluids 1.6.  Initial troponin 30 2 repeat troponin was 45.  Cardiology was consulted BNP 2308.  Echo ordered for tomorrow.  H&H 10.7/33.0 white count normal at 4.5.  Urine shows positive nitrates, large leukocyte Estrace,  WBCs, 5-10 RBCs, many bacteria, moderate amount of urine hemoglobin.  Urine culture pending.  Blood cultures x2 pending.  Patient's received fluid boluses in the ER and has been started on Rocephin 1 g IV daily pending urine cultures.    She has mild bilateral CVA tenderness on assessment.  Plan to repeat troponin, hydrate and replace electrolytes.      Patient has underlying dementia, wants to go home,  Chest x-ray done this morning shows pulmonary edema and BNP was 2300, mild elevated troponin, Intravenous 2 times per day    heparin (porcine)  5,000 Units Subcutaneous 3 times per day     Continuous Infusions:    sodium chloride 50 mL/hr at 21     PRN Meds: diphenhydrAMINE, hydrALAZINE, dextromethorphan-guaiFENesin, HYDROcodone 5 mg - acetaminophen, sodium chloride flush, acetaminophen **OR** acetaminophen, polyethylene glycol, ondansetron **OR** ondansetron, magnesium sulfate, potassium chloride **OR** potassium alternative oral replacement **OR** potassium chloride    Data:     Past Medical History:   has a past medical history of Accelerated hypertension, Atypical chest pain, Hyperlipidemia, Hypertension, and Labile blood pressure. Social History:   reports that she has quit smoking. She has never used smokeless tobacco. She reports previous alcohol use. She reports that she does not use drugs. Family History:   Family History   Problem Relation Age of Onset    Heart Disease Mother 78    Alzheimer's Disease Father 67    Heart Attack Brother 62    Heart Disease Brother     Heart Disease Brother        Vitals:  /71   Pulse 81   Temp 98.7 °F (37.1 °C) (Oral)   Resp 20   Ht 5' 1\" (1.549 m)   Wt 134 lb 3.2 oz (60.9 kg)   SpO2 96%   BMI 25.36 kg/m²   Temp (24hrs), Av °F (37.2 °C), Min:98.2 °F (36.8 °C), Max:99.8 °F (37.7 °C)    No results for input(s): POCGLU in the last 72 hours. I/O (24Hr):     Intake/Output Summary (Last 24 hours) at 2021 1217  Last data filed at 2021 0758  Gross per 24 hour   Intake 2179.03 ml   Output 975 ml   Net 1204.03 ml       Labs:  Hematology:  Recent Labs     21  0536 21  0604 21  0623   WBC 14.5* 12.4* 10.2   RBC 3.50* 3.54* 3.88*   HGB 10.3* 10.4* 11.4*   HCT 32.1* 32.2* 35.4*   MCV 91.8 91.1 91.1   MCH 29.3 29.5 29.5   MCHC 32.0 32.3 32.4   RDW 14.0 14.3 14.6   * 88* 94*   MPV 9.8 10.2 10.2   INR 1.1  --   --      Chemistry:  Recent Labs     21  1517 21  1517 21  0536 21  1013 01/29/21  1723 01/29/21  2304 01/30/21  0604 01/30/21  1155 01/31/21  0623   NA  --    < > 138  --   --   --  136  --  134*   K  --    < > 4.2  --   --   --  3.9  --  3.6*   CL  --    < > 110*  --   --   --  108*  --  103   CO2  --    < > 21  --   --   --  20  --  22   GLUCOSE  --    < > 91  --   --   --  94  --  75   BUN  --    < > 24*  --   --   --  18  --  17   CREATININE  --    < > 1.79*  --   --   --  1.18*  --  0.97*   MG  --   --  2.3  --   --   --  1.9  --   --    ANIONGAP  --    < > 7*  --   --   --  8*  --  9   LABGLOM  --    < > 27*  --   --   --  44*  --  55*   GFRAA  --    < > 33*  --   --   --  53*  --  >60   CALCIUM  --    < > 8.0*  --   --   --  8.5*  --  9.0   CAION  --   --   --   --   --   --   --  1.22  --    TROPHS 45*  --   --  26* 25* 21*  --   --   --    MYOGLOBIN 91*  --   --   --   --   --   --   --   --     < > = values in this interval not displayed. Recent Labs     01/29/21  0536   PROT 4.8*     ABG:No results found for: POCPH, PHART, PH, POCPCO2, DRX2MYW, PCO2, POCPO2, PO2ART, PO2, POCHCO3, JEY7IZD, HCO3, NBEA, PBEA, BEART, BE, THGBART, THB, PWO7JFC, HJVM0BWK, F7GQCBEU, O2SAT, FIO2  Lab Results   Component Value Date/Time    SPECIAL NOT REPORTED 01/28/2021 09:42 AM     Lab Results   Component Value Date/Time    CULTURE (A) 01/28/2021 09:42 AM     ESCHERICHIA COLI >[id number] CFU/ML THIS ORGANISM IS AN EXTENDED-SPECTRUM BETA-LACTAMASE  AND RESISTANCE TO THERAPY WITH PENICILLINS, CEPHALOSPORINS AND AZTREONAM IS EXPECTED. THESE ORGANISMS GENERALLY REMAIN SUSCEPTIBLE TO CARBAPENEMS. CONSIDER ID CONSULTATION. Radiology:  Xr Chest Portable    Result Date: 1/29/2021  Interval enlargement of cardiac silhouette and interval development of interstitial opacities with bilateral effusions consistent with pulmonary edema. Xr Chest Portable    Result Date: 1/28/2021  No acute cardiopulmonary pathology.      Us Retroperitoneal Complete    Result Date: 1/28/2021  Normal sonographic appearance of the kidneys. Physical Examination:     Physical Exam  Vitals signs and nursing note reviewed. Constitutional:       Appearance: She is not ill-appearing. HENT:      Mouth/Throat:      Mouth: Mucous membranes are dry. Eyes:      Extraocular Movements: Extraocular movements intact. Cardiovascular:      Rate and Rhythm: Normal rate and regular rhythm. Pulses: Normal pulses. Heart sounds: Normal heart sounds. Pulmonary:      Effort: Pulmonary effort is normal.      Breath sounds: Examination of the right-middle field reveals decreased breath sounds. Examination of the left-middle field reveals decreased breath sounds. Examination of the right-lower field reveals decreased breath sounds. Examination of the left-lower field reveals decreased breath sounds. Decreased breath sounds present. Comments: Upper airways  lower dim  Abdominal:      General: Bowel sounds are normal.      Palpations: Abdomen is soft. Musculoskeletal: Normal range of motion. Skin:     General: Skin is warm. Capillary Refill: Capillary refill takes more than 3 seconds. Coloration: Skin is not pale. Neurological:      Mental Status: She is alert and oriented to person, place, and time. GCS: GCS eye subscore is 4. GCS verbal subscore is 5. GCS motor subscore is 6.          Assessment:     Hospital Problems           Last Modified POA    * (Principal) Acute cystitis with hematuria 1/28/2021 Yes    Hyperlipidemia 1/28/2021 Yes    Cardiomegaly 1/28/2021 Yes    Dementia ([address] 1/28/2021 Yes    Gastroesophageal reflux disease 1/28/2021 Yes    Essential hypertension 1/28/2021 Yes    History of ventricular fibrillation 1/28/2021 Yes    History of recurrent UTIs (Chronic) 1/28/2021 Yes    Elevated brain natriuretic peptide (BNP) level 1/28/2021 Yes    Sepsis ([address] 1/28/2021 Yes    Hypomagnesemia 1/28/2021 Yes    Hypokalemia 1/28/2021 Yes    Dehydration 1/28/2021 Yes    MARCOS (acute kidney injury)

## 2021-01-31 NOTE — PROGRESS NOTES
Patient assisted up to bathroom    Feeling weak and unsteady    Did not eat but a few bites of food today    Did drink water and take in ice chips      Oxygen sat drop to 84% up to bathroom on room air,  Oxygen tubing extension added  So patient can have when in bathroom      Patient assisted into bed    Bed alarm on

## 2021-02-01 LAB
ALBUMIN (CALCULATED): 2.7 G/DL (ref 3.2–5.2)
ALBUMIN PERCENT: 56 % (ref 45–65)
ALPHA 1 PERCENT: 5 % (ref 3–6)
ALPHA 2 PERCENT: 13 % (ref 6–13)
ALPHA-1-GLOBULIN: 0.2 G/DL (ref 0.1–0.4)
ALPHA-2-GLOBULIN: 0.6 G/DL (ref 0.5–0.9)
ANION GAP SERPL CALCULATED.3IONS-SCNC: 7 MMOL/L (ref 9–17)
ANTI-NUCLEAR ANTIBODY (ANA): NEGATIVE
BETA GLOBULIN: 0.6 G/DL (ref 0.5–1.1)
BETA PERCENT: 12 % (ref 11–19)
BUN BLDV-MCNC: 14 MG/DL (ref 8–23)
BUN/CREAT BLD: ABNORMAL (ref 9–20)
CALCIUM SERPL-MCNC: 8.5 MG/DL (ref 8.6–10.4)
CHLORIDE BLD-SCNC: 105 MMOL/L (ref 98–107)
CO2: 21 MMOL/L (ref 20–31)
CREAT SERPL-MCNC: 0.8 MG/DL (ref 0.5–0.9)
GAMMA GLOBULIN %: 14 % (ref 9–20)
GAMMA GLOBULIN: 0.7 G/DL (ref 0.5–1.5)
GFR AFRICAN AMERICAN: >60 ML/MIN
GFR NON-AFRICAN AMERICAN: >60 ML/MIN
GFR SERPL CREATININE-BSD FRML MDRD: ABNORMAL ML/MIN/{1.73_M2}
GFR SERPL CREATININE-BSD FRML MDRD: ABNORMAL ML/MIN/{1.73_M2}
GLUCOSE BLD-MCNC: 99 MG/DL (ref 70–99)
HCT VFR BLD CALC: 30.4 % (ref 36–46)
HEMOGLOBIN: 9.8 G/DL (ref 12–16)
HEPARIN INDUCED PLATELET ANTIBODY: 0.29 O.D. (ref 0–0.4)
MCH RBC QN AUTO: 29.7 PG (ref 26–34)
MCHC RBC AUTO-ENTMCNC: 32.1 G/DL (ref 31–37)
MCV RBC AUTO: 92.4 FL (ref 80–100)
NRBC AUTOMATED: ABNORMAL PER 100 WBC
PATHOLOGIST: ABNORMAL
PATHOLOGIST: NORMAL
PDW BLD-RTO: 14.7 % (ref 12.5–15.4)
PLATELET # BLD: 94 K/UL (ref 140–450)
PMV BLD AUTO: 10.9 FL (ref 6–12)
POTASSIUM SERPL-SCNC: 3.6 MMOL/L (ref 3.7–5.3)
PROTEIN ELECTROPHORESIS, SERUM: ABNORMAL
RBC # BLD: 3.29 M/UL (ref 4–5.2)
SERUM IFX INTERP: NORMAL
SODIUM BLD-SCNC: 133 MMOL/L (ref 135–144)
TOTAL PROT. SUM,%: 100 % (ref 98–102)
TOTAL PROT. SUM: 4.8 G/DL (ref 6.3–8.2)
TOTAL PROTEIN: 4.8 G/DL (ref 6.4–8.3)
WBC # BLD: 7.8 K/UL (ref 3.5–11)

## 2021-02-01 PROCEDURE — 05HB33Z INSERTION OF INFUSION DEVICE INTO RIGHT BASILIC VEIN, PERCUTANEOUS APPROACH: ICD-10-PCS | Performed by: INTERNAL MEDICINE

## 2021-02-01 PROCEDURE — 85027 COMPLETE CBC AUTOMATED: CPT

## 2021-02-01 PROCEDURE — 97116 GAIT TRAINING THERAPY: CPT

## 2021-02-01 PROCEDURE — 2580000003 HC RX 258: Performed by: NURSE PRACTITIONER

## 2021-02-01 PROCEDURE — 80048 BASIC METABOLIC PNL TOTAL CA: CPT

## 2021-02-01 PROCEDURE — 6360000002 HC RX W HCPCS: Performed by: NURSE PRACTITIONER

## 2021-02-01 PROCEDURE — APPSS45 APP SPLIT SHARED TIME 31-45 MINUTES: Performed by: NURSE PRACTITIONER

## 2021-02-01 PROCEDURE — 97530 THERAPEUTIC ACTIVITIES: CPT

## 2021-02-01 PROCEDURE — 36415 COLL VENOUS BLD VENIPUNCTURE: CPT

## 2021-02-01 PROCEDURE — 6370000000 HC RX 637 (ALT 250 FOR IP): Performed by: NURSE PRACTITIONER

## 2021-02-01 PROCEDURE — 94761 N-INVAS EAR/PLS OXIMETRY MLT: CPT

## 2021-02-01 PROCEDURE — 99232 SBSQ HOSP IP/OBS MODERATE 35: CPT | Performed by: INTERNAL MEDICINE

## 2021-02-01 PROCEDURE — 2700000000 HC OXYGEN THERAPY PER DAY

## 2021-02-01 PROCEDURE — 1210000000 HC MED SURG R&B

## 2021-02-01 RX ORDER — LIDOCAINE HYDROCHLORIDE 10 MG/ML
5 INJECTION, SOLUTION INFILTRATION; PERINEURAL ONCE
Status: DISCONTINUED | OUTPATIENT
Start: 2021-02-01 | End: 2021-02-03 | Stop reason: HOSPADM

## 2021-02-01 RX ORDER — LANOLIN ALCOHOL/MO/W.PET/CERES
325 CREAM (GRAM) TOPICAL
Status: DISCONTINUED | OUTPATIENT
Start: 2021-02-02 | End: 2021-02-03 | Stop reason: HOSPADM

## 2021-02-01 RX ORDER — SODIUM CHLORIDE 0.9 % (FLUSH) 0.9 %
10 SYRINGE (ML) INJECTION EVERY 12 HOURS SCHEDULED
Status: DISCONTINUED | OUTPATIENT
Start: 2021-02-01 | End: 2021-02-03 | Stop reason: HOSPADM

## 2021-02-01 RX ORDER — SODIUM CHLORIDE 0.9 % (FLUSH) 0.9 %
10 SYRINGE (ML) INJECTION PRN
Status: DISCONTINUED | OUTPATIENT
Start: 2021-02-01 | End: 2021-02-03 | Stop reason: HOSPADM

## 2021-02-01 RX ADMIN — HEPARIN SODIUM 5000 UNITS: 5000 INJECTION INTRAVENOUS; SUBCUTANEOUS at 06:03

## 2021-02-01 RX ADMIN — SODIUM CHLORIDE: 9 INJECTION, SOLUTION INTRAVENOUS at 02:05

## 2021-02-01 RX ADMIN — PANTOPRAZOLE SODIUM 40 MG: 40 TABLET, DELAYED RELEASE ORAL at 09:37

## 2021-02-01 RX ADMIN — HEPARIN SODIUM 5000 UNITS: 5000 INJECTION INTRAVENOUS; SUBCUTANEOUS at 15:44

## 2021-02-01 RX ADMIN — ACETAMINOPHEN 650 MG: 325 TABLET ORAL at 09:42

## 2021-02-01 RX ADMIN — GUAIFENESIN 600 MG: 600 TABLET, EXTENDED RELEASE ORAL at 09:37

## 2021-02-01 RX ADMIN — GUAIFENESIN 600 MG: 600 TABLET, EXTENDED RELEASE ORAL at 19:21

## 2021-02-01 RX ADMIN — MEROPENEM 500 MG: 500 INJECTION, POWDER, FOR SOLUTION INTRAVENOUS at 15:38

## 2021-02-01 RX ADMIN — SODIUM CHLORIDE, PRESERVATIVE FREE 10 ML: 5 INJECTION INTRAVENOUS at 19:23

## 2021-02-01 RX ADMIN — HEPARIN SODIUM 5000 UNITS: 5000 INJECTION INTRAVENOUS; SUBCUTANEOUS at 21:30

## 2021-02-01 RX ADMIN — DEXTROMETHORPHAN HYDROBROMIDE AND GUAIFENESIN 10 ML: 20; 200 LIQUID ORAL at 01:17

## 2021-02-01 RX ADMIN — MEROPENEM 500 MG: 500 INJECTION, POWDER, FOR SOLUTION INTRAVENOUS at 03:54

## 2021-02-01 RX ADMIN — HYDROCODONE BITARTRATE AND ACETAMINOPHEN 1 TABLET: 5; 325 TABLET ORAL at 02:15

## 2021-02-01 RX ADMIN — DEXTROMETHORPHAN HYDROBROMIDE AND GUAIFENESIN 10 ML: 20; 200 LIQUID ORAL at 18:38

## 2021-02-01 RX ADMIN — ASPIRIN 81 MG: 81 TABLET, CHEWABLE ORAL at 09:37

## 2021-02-01 RX ADMIN — HYDROCODONE BITARTRATE AND ACETAMINOPHEN 1 TABLET: 5; 325 TABLET ORAL at 19:21

## 2021-02-01 RX ADMIN — ACETAMINOPHEN 650 MG: 325 TABLET ORAL at 18:38

## 2021-02-01 ASSESSMENT — PAIN SCALES - GENERAL
PAINLEVEL_OUTOF10: 6
PAINLEVEL_OUTOF10: 3
PAINLEVEL_OUTOF10: 5

## 2021-02-01 ASSESSMENT — ENCOUNTER SYMPTOMS
WHEEZING: 0
NAUSEA: 0
CONSTIPATION: 0
DIARRHEA: 0
SHORTNESS OF BREATH: 0
COUGH: 1
ABDOMINAL PAIN: 0
CHEST TIGHTNESS: 0
VOMITING: 0
BLOOD IN STOOL: 0

## 2021-02-01 ASSESSMENT — PAIN DESCRIPTION - DESCRIPTORS
DESCRIPTORS: ACHING
DESCRIPTORS: ACHING

## 2021-02-01 ASSESSMENT — PAIN DESCRIPTION - ONSET
ONSET: ON-GOING
ONSET: ON-GOING

## 2021-02-01 ASSESSMENT — PAIN DESCRIPTION - PAIN TYPE
TYPE: ACUTE PAIN

## 2021-02-01 ASSESSMENT — PAIN DESCRIPTION - LOCATION
LOCATION: GENERALIZED
LOCATION: GENERALIZED

## 2021-02-01 ASSESSMENT — PAIN DESCRIPTION - PROGRESSION
CLINICAL_PROGRESSION: NOT CHANGED

## 2021-02-01 ASSESSMENT — PAIN DESCRIPTION - FREQUENCY: FREQUENCY: CONTINUOUS

## 2021-02-01 NOTE — PROGRESS NOTES
Physician Progress Note      PATIENT:               Seamus Arriaga  CSN #:                  [id number]  :                       1940  ADMIT DATE:       2021 7:45 AM  DISCH DATE:  RESPONDING  PROVIDER #:        Deion Zepeda CNP          QUERY TEXT:    Patient admitted with sepsis. Noted documentation of acute CHF in daily   progress notes. If possible, please document in progress notes and discharge   summary:    The medical record reflects the following:  Risk Factors: HTN  Clinical Indicators: daily prog notes say acute CHF, BNP 2300, , echo shows an   EF of greater than 55% with mild left ventricular hypertrophy, CXR shows   bilateral effusions consistent with pulmonary edema  Treatment: no diuretics noted per [facility] on this admission, IVF at 50/hr, labs,   CXR, echo    Call if any questions. Thank you, Toro Chapman, [address] [place] [address]  Options provided:  -- Acute CHF present as evidenced by, Please document evidence and specify   systolic, diastolic. -- Chronic CHF, .  -- Acute CHF was ruled out  -- Other - I will add my own diagnosis  -- Disagree - Not applicable / Not valid  -- Disagree - Clinically unable to determine / Unknown  -- Refer to Clinical Documentation Reviewer    PROVIDER RESPONSE TEXT:    Acute CHF is present as evidenced by Hypoxia and need for supplemental oxygen.     Query created by: Annie Zimmer on 2021 1:12 PM      Electronically signed by:  Tonya Zepeda CNP 2021 1:16 PM

## 2021-02-01 NOTE — PLAN OF CARE
Problem: Falls - Risk of:  Goal: Will remain free from falls  Description: Will remain free from falls  Outcome: Met This Shift     Problem: Urinary Elimination:  Goal: Signs and symptoms of infection will decrease  Description: Signs and symptoms of infection will decrease  Outcome: Met This Shift     Problem: Sensory:  Goal: General experience of comfort will improve  Description: General experience of comfort will improve  Outcome: Met This Shift

## 2021-02-01 NOTE — PROGRESS NOTES
Pt has been complaining of aches, chills and cough throughout the day. Med with tylenol and and robitussin.  Plan for home on discharge with IV atb

## 2021-02-01 NOTE — PROGRESS NOTES
Physical Therapy  Facility/Department: [facility] SURG ICU  Daily Treatment Note  NAME: Maria C Woods  : 1940  MRN: [id number]    Date of Service: 2021  Chief Complaint   Patient presents with    Fatigue     feeling run down x 3 days.  Cough     clear sputum with cough and nasal congestion    Arm Pain     left arm pain       Discharge Recommendations:  Patient would benefit from continued therapy after discharge   PT Equipment Recommendations  Equipment Needed: Yes  Mobility Devices: Nicole [place]: Rolling    Assessment   Body structures, Functions, Activity limitations: Decreased functional mobility ; Decreased safe awareness;Decreased strength;Decreased balance;Decreased endurance;Decreased coordination  Assessment: Pt demonstrates some gait deficiencies and is significantly less steady while ambulating without an assistive device. Gait deficiencies nearly absent while ambulating with RW.  Pt's oxygen saturation reamined in to 90's throughout today's session. Pt would benefit from further therapy upon discharge. Pt would be safe to return to prior living arrangements with additional therapy and use of a RW. Prognosis: Excellent  PT Education: Transfer Training;Functional Mobility Training;General Safety;Gait Training  REQUIRES PT FOLLOW UP: Yes  Activity Tolerance  Activity Tolerance: Patient Tolerated treatment well  Activity Tolerance: Today's treatment on room air, pt's oxygen saturation dropped to 90% at one point during ambulation but recovered and remained in the 90's throughout today's session. Patient Diagnosis(es): The primary encounter diagnosis was Acute UTI. A diagnosis of Septicemia [address] was also pertinent to this visit. has a past medical history of Accelerated hypertension, Atypical chest pain, Hyperlipidemia, Hypertension, and Labile blood pressure. has a past surgical history that includes Hysterectomy;  Abdomen surgery; LIGATION OF SAPHENOUS VEIN; and bladder suspension. Restrictions  Restrictions/Precautions  Restrictions/Precautions: Fall Risk, Contact Precautions, Up as Tolerated  Required Braces or Orthoses?: No  Subjective   General  Response To Previous Treatment: Patient with no complaints from previous session. Family / Caregiver Present: No  Subjective  Subjective: Pt supine in bed, RN and pt agreeable to therapy  Pain Screening  Patient Currently in Pain: Denies  Vital Signs  Patient Currently in Pain: Denies       Orientation  Orientation  Overall Orientation Status: Within Functional Limits  Cognition   Cognition  Overall Cognitive Status: WFL  Objective   Bed mobility  Supine to Sit: Stand by assistance  Scooting: Stand by assistance  Comment: HOB flat, no use of hand rails, use of EOB to assist.  Transfers  Sit to Stand: Stand by assistance  Stand to sit: Stand by assistance  Bed to Chair: Stand by assistance  Ambulation  Ambulation?: Yes  More Ambulation?: Yes  Ambulation 1  Surface: level tile  Device: No Device  Assistance: Stand by assistance  Quality of Gait: Inaccurate step placement, pt crosses LLE over midline x2 during ambulation today but able to recover with a minor loss of balance- no physical assistance needed by therapist.  Gait Deviations: Decreased step length;Decreased step height  Distance: Pt amb 110 feet x2 with seated rest break between sets CGA with no assistive device. Comments: Slightly decreased safety awareness of IV lines/surroundings. Ambulation 2  Surface - 2: level tile  Device 2: Rolling Walker  Assistance: Stand by assistance  Quality of Gait 2: Steady gait, slightly decreased step height  Gait Deviations: Decreased step height  Distance: 12 feet x2 with seated rest break between sets.   Stairs/Curb  Stairs?: Yes  Stairs  # Steps : 10  Stairs Height: 6\"  Rails: Right ascending;Bilateral  Assistance: Contact guard assistance                   Goals  Short term goals  Time Frame for Short term goals: 14 days  Short term goal 1: Pt independent with all tranfers various surfaces with least restrictive device for safe return to home . Short term goal 2: Pt independent with ambulation and least restrictive device distance greater than or equal to 150 feet x 1. Short term goal 3: Pt independent with  ascending/descending steps for safe navigation within home and to enter home. Patient Goals   Patient goals : to go home    Plan    Plan  Times per week: 5-6x/week. Times per day: Daily  Current Treatment Recommendations: Functional Mobility Training, Transfer Training, Endurance Training, Gait Training, Stair training  Plan Comment: Pt will receive functional mobility training and gait. Safety Devices  Type of devices: Call light within reach, Chair alarm in place, Left in chair, Nurse notified, Gait belt, Patient at risk for falls  Restraints  Initially in place: No     Therapy Time   Individual Concurrent Group Co-treatment   Time In 1314         Time Out 1344         Minutes 30         Timed Code Treatment Minutes: DAPHNE Morales  Evaluation/treatment performed by Student PT under the supervision of co-signing PT who agrees with all evaluation/treatment and documentation.

## 2021-02-01 NOTE — PLAN OF CARE
Problem: Falls - Risk of:  Goal: Will remain free from falls  Description: Will remain free from falls  2/1/2021 0304 by Adrián Adame RN  Outcome: Ongoing   Pt assessed as a fall risk this shift. Remains free from falls and accidental injury at this time. Fall precautions in place, including falling star sign and fall risk band on pt. Floor free from obstacles, and bed is locked and in lowest position. Adequate lighting provided. Pt encouraged to call before getting OOB for any need. Bed alarm activated. Will continue to monitor needs during hourly rounding, and reinforce education on use of call light. Problem: Sensory:  Goal: General experience of comfort will improve  Description: General experience of comfort will improve  2/1/2021 0304 by Adrián Adame RN  Outcome: Ongoing     Problem: Urinary Elimination:  Goal: Signs and symptoms of infection will decrease  Description: Signs and symptoms of infection will decrease  2/1/2021 0304 by Adrián Adame RN  Outcome: Ongoing     Problem: Pain:  Goal: Pain level will decrease  Description: Pain level will decrease  2/1/2021 0304 by Adrián Adame RN  Outcome: Ongoing   PRN pain medications ordered (See Mar).

## 2021-02-01 NOTE — CARE COORDINATION
NH predict tool has been uploaded into chart. Greater gains with [facility] at discharge.

## 2021-02-01 NOTE — CARE COORDINATION
Clarified with Dr. Roosevelt Holter that patient, if to d/c home will need Invanz 1 GM 1 time daily for total of 14 day abx therapy with midline placement. Carissa at [facility] [employer] calls with cost of medication at 686.00 for 1st dispense plus 15.00 per day supply cost and 242.00 per dispense and 15.00 per day supply charge after. Patient and spouse updated at bedside about IV abx plan, cost, and need for home care.  Need home care choice, written scripts, and midline placement verification sent to Shayla.

## 2021-02-01 NOTE — PROGRESS NOTES
[facility]  Office: [address], DO, Rena Santana, DO, Tariq Nichole, DO, Queenie Vogel, DO, Huang Acuna MD, Silke Galindo MD, Charlee Arvizu MD, Killian Salgado MD, Em Greco MD, Kendra Nguyen MD, Hardeep Naqvi MD, Karen Ayoub MD, Artemio Buchanan MD, Thomas Hough DO, Bonilla Herrera MD, Musa Hickey MD, Marquis Jha DO, Jonah Russell MD,  Marleen Stevenson DO, Karl Granados MD, Luis Miguel Neves MD, Nazario Stanford, MARY, [address], [facility], Rima Sampson, CNP, Sheila Sharpe, CNS, Ángel Aleman, CNP, Hector Aviles, CNP, Phill Kunz, CNP, Elizabeth Orourke, CNP, Florian Ortega, CNP, Nabil Mendoza PA-C, Misty Cordon, [facility], Román Purcell, CNP, Antionette Reddy, CNP, Shaun Duran, CNP, Lucia Galindo, [facility], Colin [address]    Progress Note    2/1/2021    11:19 AM    Name:   Burgess Lees  MRN:     [id number]     Acct:      [de-identified]   Room:   [address] [place] [address]-[facility] Day:  4  Admit Date:  1/28/2021  7:45 AM    PCP:   Donal Oneil MD  Code Status:  Full Code    Subjective:     C/C:   Chief Complaint   Patient presents with    Fatigue     feeling run down x 3 days.  Cough     clear sputum with cough and nasal congestion    Arm Pain     left arm pain      Interval History Status:  Improved    Patient is sitting up in bed drinking juice this morning. During my assessment she says she feels better and she wants to go home. She denies nausea this morning during my assessment. She denies problems with bowel or bladder, no shortness of breath chest pain or abdominal pain. At this point we are waiting for insurance for home antibiotic therapy. Brief History:     Per previous documentation    Burgess Lees is a 79 y.o.  Non-/non  female who presents with Fatigue (feeling run down x 3 days.), Cough (clear sputum with cough and nasal congestion), and Arm Pain (left arm pain )   and is changed to Meropenum and ID was consulted. Patient requires a lot of encouragement to get oob. RT aware of need for IS and pulmonary toileting. New order for PT/OT. MARCOS resolved. Dr. Kallie Richter is following. Per his note the patient will require 10 to 14 days of IV antimicrobial therapy. Max temp in the last 24 hours was 37.3. White count trending back down. Patient had been wearing oxygen while here. I spoke with RT and PT about assessing her need for oxygen at rest and with ambulation. Patient denies shortness of breath      Review of Systems:     Review of Systems   Constitutional: Positive for fatigue. Negative for chills, diaphoresis and fever (she says she feels feverish). HENT: Negative for congestion. Eyes: Negative for visual disturbance. Respiratory: Positive for cough (Dry nonproductive). Negative for chest tightness, shortness of breath and wheezing. Cardiovascular: Negative for chest pain, palpitations and leg swelling. Gastrointestinal: Negative for abdominal pain, blood in stool, constipation, diarrhea, nausea and vomiting. Genitourinary: Negative for difficulty urinating. Musculoskeletal: Negative for myalgias. Neurological: Negative for dizziness, weakness, light-headedness, numbness and headaches. All other systems reviewed and are negative. Medications: Allergies:     Allergies   Allergen Reactions    Morphine Nausea And Vomiting    Doxycycline Other (See Comments)     Other reaction(s): diarrhea.stomach pain      Azithromycin Other (See Comments)    Ciprofloxacin Other (See Comments)       Current Meds:   Scheduled Meds:    meropenem  500 mg Intravenous Q12H    guaiFENesin  600 mg Oral BID    pantoprazole  40 mg Oral Daily    [Held by provider] atorvastatin  10 mg Oral Daily    aspirin  81 mg Oral Daily    sodium chloride flush  10 mL Intravenous 2 times per day    heparin (porcine)  5,000 Units Subcutaneous 3 times per day     Continuous Infusions:    sodium chloride 50 mL/hr at 21 0205     PRN Meds: diphenhydrAMINE, hydrALAZINE, dextromethorphan-guaiFENesin, HYDROcodone 5 mg - acetaminophen, sodium chloride flush, acetaminophen **OR** acetaminophen, polyethylene glycol, ondansetron **OR** ondansetron, magnesium sulfate, potassium chloride **OR** potassium alternative oral replacement **OR** potassium chloride    Data:     Past Medical History:   has a past medical history of Accelerated hypertension, Atypical chest pain, Hyperlipidemia, Hypertension, and Labile blood pressure. Social History:   reports that she has quit smoking. She has never used smokeless tobacco. She reports previous alcohol use. She reports that she does not use drugs. Family History:   Family History   Problem Relation Age of Onset    Heart Disease Mother 78    Alzheimer's Disease Father 67    Heart Attack Brother 62    Heart Disease Brother     Heart Disease Brother        Vitals:  BP (!) 141/72   Pulse 94   Temp 97.9 °F (36.6 °C) (Oral)   Resp 18   Ht 5' 1\" (1.549 m)   Wt 132 lb 11.2 oz (60.2 kg)   SpO2 99%   BMI 25.07 kg/m²   Temp (24hrs), Av.3 °F (36.8 °C), Min:97.6 °F (36.4 °C), Max:99.1 °F (37.3 °C)    No results for input(s): POCGLU in the last 72 hours. I/O (24Hr):     Intake/Output Summary (Last 24 hours) at 2021 1119  Last data filed at 2021 0934  Gross per 24 hour   Intake 400 ml   Output 2100 ml   Net -1700 ml       Labs:  Hematology:  Recent Labs     21  0604 21  0623 21  0548   WBC 12.4* 10.2 7.8   RBC 3.54* 3.88* 3.29*   HGB 10.4* 11.4* 9.8*   HCT 32.2* 35.4* 30.4*   MCV 91.1 91.1 92.4   MCH 29.5 29.5 29.7   MCHC 32.3 32.4 32.1   RDW 14.3 14.6 14.7   PLT 88* 94* 94*   MPV 10.2 10.2 10.9     Chemistry:  Recent Labs     21  1723 21  2304 21  0604 21  1155 21  0623 21  0548   NA  --   --  136  --  134* 133*   K  --   --  3.9  --  3.6* 3.6*   CL  --   --  108*  --  103 105 CO2  --   --  20  --  22 21   GLUCOSE  --   --  94  --  75 99   BUN  --   --  18  --  17 14   CREATININE  --   --  1.18*  --  0.97* 0.80   MG  --   --  1.9  --   --   --    ANIONGAP  --   --  8*  --  9 7*   LABGLOM  --   --  44*  --  55* >60   GFRAA  --   --  53*  --  >60 >60   CALCIUM  --   --  8.5*  --  9.0 8.5*   CAION  --   --   --  1.22  --   --    TROPHS 25* 21*  --   --   --   --      No results for input(s): PROT, LABALBU, LABA1C, H4RPKPU, E5IOPIX, FT4, TSH, AST, ALT, LDH, GGT, ALKPHOS, LABGGT, BILITOT, BILIDIR, AMMONIA, AMYLASE, LIPASE, LACTATE, CHOL, HDL, LDLCHOLESTEROL, CHOLHDLRATIO, TRIG, VLDL, CXH33EN, PHENYTOIN, PHENYF, URICACID, POCGLU in the last 72 hours. ABG:No results found for: POCPH, PHART, PH, POCPCO2, VXJ6DSL, PCO2, POCPO2, PO2ART, PO2, POCHCO3, JMH7PCP, HCO3, NBEA, PBEA, BEART, BE, THGBART, THB, JFI7JEW, KVHH7MON, P0RGVZPT, O2SAT, FIO2  Lab Results   Component Value Date/Time    SPECIAL NOT REPORTED 01/28/2021 09:42 AM     Lab Results   Component Value Date/Time    CULTURE (A) 01/28/2021 09:42 AM     ESCHERICHIA COLI >298692 CFU/ML THIS ORGANISM IS AN EXTENDED-SPECTRUM BETA-LACTAMASE  AND RESISTANCE TO THERAPY WITH PENICILLINS, CEPHALOSPORINS AND AZTREONAM IS EXPECTED. THESE ORGANISMS GENERALLY REMAIN SUSCEPTIBLE TO CARBAPENEMS. CONSIDER ID CONSULTATION. Radiology:  Xr Chest Portable    Result Date: 1/29/2021  Interval enlargement of cardiac silhouette and interval development of interstitial opacities with bilateral effusions consistent with pulmonary edema. Xr Chest Portable    Result Date: 1/28/2021  No acute cardiopulmonary pathology. Us Retroperitoneal Complete    Result Date: 1/28/2021  Normal sonographic appearance of the kidneys. Physical Examination:     Physical Exam  Vitals signs and nursing note reviewed. Constitutional:       Appearance: She is not ill-appearing. HENT:      Mouth/Throat:      Mouth: Mucous membranes are dry.    Eyes: Extraocular Movements: Extraocular movements intact. Cardiovascular:      Rate and Rhythm: Normal rate and regular rhythm. Pulses: Normal pulses. Heart sounds: Normal heart sounds. Pulmonary:      Effort: Pulmonary effort is normal.      Breath sounds: No decreased breath sounds. Comments: Upper airways  lower dim  Abdominal:      General: Bowel sounds are normal.      Palpations: Abdomen is soft. Musculoskeletal: Normal range of motion. Skin:     General: Skin is warm. Capillary Refill: Capillary refill takes more than 3 seconds. Coloration: Skin is not pale. Neurological:      Mental Status: She is alert and oriented to person, place, and time. GCS: GCS eye subscore is 4. GCS verbal subscore is 5. GCS motor subscore is 6. Assessment:     Hospital Problems           Last Modified POA    * (Principal) Acute cystitis with hematuria 1/28/2021 Yes    Hyperlipidemia 1/28/2021 Yes    Cardiomegaly 1/28/2021 Yes    Dementia ([address] 1/28/2021 Yes    Gastroesophageal reflux disease 1/28/2021 Yes    Essential hypertension 1/28/2021 Yes    History of ventricular fibrillation 1/28/2021 Yes    History of recurrent UTIs (Chronic) 1/28/2021 Yes    Elevated brain natriuretic peptide (BNP) level 1/28/2021 Yes    Sepsis ([address] 1/28/2021 Yes    Hypomagnesemia 1/28/2021 Yes    Hypokalemia 1/28/2021 Yes    Dehydration 1/28/2021 Yes    MARCOS (acute kidney injury) ([address] 1/28/2021 Yes    Elevated troponin 1/28/2021 Yes    Hypoxia 1/29/2021 Yes    Gram-negative bacteremia 1/29/2021 Yes    Sepsis due to Escherichia coli with acute renal failure and tubular necrosis without septic shock ([address] 1/29/2021 Yes          Plan:     1. Sepsis due to underlying ESCHERICHIA COLI THIS ORGANISM IS AN EXTENDED-SPECTRUM BETA-LACTAMASE , ATB changed to Meropenum and ID consulted.    2. Acute CHF with a BNP of 2300, mildly elevated troponin possibly due to type II MI, cardiology on board, echo shows an EF of greater than 55% with mild left ventricular hypertrophy, will continue to monitor fluid status  3. Acute respiratory failure with hypoxia, needing 2 to 3 L of oxygen, chest x-ray showed pulmonary edema, echo shows some degree of dystolic dysfunction. IV fluids decreased. Weaning oxygen as tolerated. pulmonary toileting. RT and therapy asked to assess need for oxygen at rest and with activity  4. Underlying dementia, without behavioral changes,  5. Acute kidney injury presented with creatinine of 2.15, baseline 0.7, Nephrology following  6. Full CODE STATUS  7. Start iron supplementation 2/2  8. DVT prophylaxis  9. Case management working on discharge planning related to IV antibiotic needs.   Plan for discharge soon    RIC Coleman - CNP  2/1/2021  11:19 AM

## 2021-02-01 NOTE — PLAN OF CARE
Problem: Falls - Risk of:  Goal: Will remain free from falls  Description: Will remain free from falls  2/1/2021 1047 by Joycelyn Mcneill RN  Outcome: Ongoing  2/1/2021 0304 by Emely Rosales RN  Outcome: Ongoing  Goal: Absence of physical injury  Description: Absence of physical injury  2/1/2021 1047 by Joycelyn Mcneill RN  Outcome: Ongoing  2/1/2021 0304 by Emely Rosales RN  Outcome: Ongoing     Problem: Sensory:  Goal: General experience of comfort will improve  Description: General experience of comfort will improve  2/1/2021 1047 by Joycelyn Mcneill RN  Outcome: Ongoing  2/1/2021 0304 by Emely Rosales RN  Outcome: Ongoing     Problem: Urinary Elimination:  Goal: Signs and symptoms of infection will decrease  Description: Signs and symptoms of infection will decrease  2/1/2021 1047 by Joycelyn Mcneill RN  Outcome: Ongoing  2/1/2021 0304 by Emely Rosales RN  Outcome: Ongoing  Goal: Ability to reestablish a normal urinary elimination pattern will improve - after catheter removal  Description: Ability to reestablish a normal urinary elimination pattern will improve  2/1/2021 1047 by Joycelyn Mcneill RN  Outcome: Ongoing  2/1/2021 0304 by Emely Rosales RN  Outcome: Ongoing  Goal: Complications related to the disease process, condition or treatment will be avoided or minimized  Description: Complications related to the disease process, condition or treatment will be avoided or minimized  2/1/2021 1047 by Joycelyn Mcneill RN  Outcome: Ongoing  2/1/2021 0304 by Emely Rosales RN  Outcome: Ongoing     Problem: Pain:  Goal: Pain level will decrease  Description: Pain level will decrease  2/1/2021 1047 by Joycelyn Mcneill RN  Outcome: Ongoing  2/1/2021 0304 by Emely Rosales RN  Outcome: Ongoing  Goal: Control of acute pain  Description: Control of acute pain  2/1/2021 1047 by Joycelyn Mcneill RN  Outcome: Ongoing  2/1/2021 0304 by Myrna Bernardo RN  Outcome: Ongoing  Goal: Control of chronic pain  Description: Control of chronic pain  2/1/2021 1047 by Sean Kaur RN  Outcome: Ongoing  2/1/2021 0304 by Myrna Bernardo RN  Outcome: Ongoing     Problem: Skin Integrity:  Goal: Will show no infection signs and symptoms  Description: Will show no infection signs and symptoms  Outcome: Ongoing  Goal: Absence of new skin breakdown  Description: Absence of new skin breakdown  Outcome: Ongoing

## 2021-02-01 NOTE — PROGRESS NOTES
RT in to work with patient regarding Incentive Spirometry as well as Cough & Deep Breathing . Encouragement provided to usd the equipment provided . Pt noted to achieve  250-500 at most , and was instructed to use every hour .

## 2021-02-02 LAB
ANION GAP SERPL CALCULATED.3IONS-SCNC: 14 MMOL/L (ref 9–17)
BUN BLDV-MCNC: 11 MG/DL (ref 8–23)
BUN/CREAT BLD: ABNORMAL (ref 9–20)
CALCIUM SERPL-MCNC: 8.5 MG/DL (ref 8.6–10.4)
CHLORIDE BLD-SCNC: 103 MMOL/L (ref 98–107)
CO2: 22 MMOL/L (ref 20–31)
CREAT SERPL-MCNC: 0.61 MG/DL (ref 0.5–0.9)
GFR AFRICAN AMERICAN: >60 ML/MIN
GFR NON-AFRICAN AMERICAN: >60 ML/MIN
GFR SERPL CREATININE-BSD FRML MDRD: ABNORMAL ML/MIN/{1.73_M2}
GFR SERPL CREATININE-BSD FRML MDRD: ABNORMAL ML/MIN/{1.73_M2}
GLUCOSE BLD-MCNC: 97 MG/DL (ref 70–99)
HCT VFR BLD CALC: 31.2 % (ref 36–46)
HEMOGLOBIN: 10.3 G/DL (ref 12–16)
MAGNESIUM: 1.5 MG/DL (ref 1.6–2.6)
MAGNESIUM: 2.3 MG/DL (ref 1.6–2.6)
MCH RBC QN AUTO: 29.4 PG (ref 26–34)
MCHC RBC AUTO-ENTMCNC: 33 G/DL (ref 31–37)
MCV RBC AUTO: 89 FL (ref 80–100)
NRBC AUTOMATED: ABNORMAL PER 100 WBC
PDW BLD-RTO: 13.9 % (ref 12.5–15.4)
PLATELET # BLD: 97 K/UL (ref 140–450)
PMV BLD AUTO: 10.3 FL (ref 6–12)
POTASSIUM SERPL-SCNC: 3 MMOL/L (ref 3.7–5.3)
POTASSIUM SERPL-SCNC: 3.9 MMOL/L (ref 3.7–5.3)
RBC # BLD: 3.5 M/UL (ref 4–5.2)
SODIUM BLD-SCNC: 139 MMOL/L (ref 135–144)
WBC # BLD: 7.6 K/UL (ref 3.5–11)

## 2021-02-02 PROCEDURE — APPSS45 APP SPLIT SHARED TIME 31-45 MINUTES: Performed by: NURSE PRACTITIONER

## 2021-02-02 PROCEDURE — 94761 N-INVAS EAR/PLS OXIMETRY MLT: CPT

## 2021-02-02 PROCEDURE — 6360000002 HC RX W HCPCS: Performed by: NURSE PRACTITIONER

## 2021-02-02 PROCEDURE — 85027 COMPLETE CBC AUTOMATED: CPT

## 2021-02-02 PROCEDURE — 0202U NFCT DS 22 TRGT SARS-COV-2: CPT

## 2021-02-02 PROCEDURE — 94618 PULMONARY STRESS TESTING: CPT

## 2021-02-02 PROCEDURE — 2580000003 HC RX 258: Performed by: NURSE PRACTITIONER

## 2021-02-02 PROCEDURE — 36415 COLL VENOUS BLD VENIPUNCTURE: CPT

## 2021-02-02 PROCEDURE — 99232 SBSQ HOSP IP/OBS MODERATE 35: CPT | Performed by: INTERNAL MEDICINE

## 2021-02-02 PROCEDURE — 6360000002 HC RX W HCPCS: Performed by: INTERNAL MEDICINE

## 2021-02-02 PROCEDURE — 6370000000 HC RX 637 (ALT 250 FOR IP): Performed by: NURSE PRACTITIONER

## 2021-02-02 PROCEDURE — 80048 BASIC METABOLIC PNL TOTAL CA: CPT

## 2021-02-02 PROCEDURE — 2700000000 HC OXYGEN THERAPY PER DAY

## 2021-02-02 PROCEDURE — 2580000003 HC RX 258: Performed by: INTERNAL MEDICINE

## 2021-02-02 PROCEDURE — 84132 ASSAY OF SERUM POTASSIUM: CPT

## 2021-02-02 PROCEDURE — 87040 BLOOD CULTURE FOR BACTERIA: CPT

## 2021-02-02 PROCEDURE — 83735 ASSAY OF MAGNESIUM: CPT

## 2021-02-02 PROCEDURE — 1210000000 HC MED SURG R&B

## 2021-02-02 PROCEDURE — 97535 SELF CARE MNGMENT TRAINING: CPT

## 2021-02-02 RX ORDER — METOPROLOL TARTRATE 50 MG/1
100 TABLET, FILM COATED ORAL 2 TIMES DAILY
Status: DISCONTINUED | OUTPATIENT
Start: 2021-02-02 | End: 2021-02-03 | Stop reason: HOSPADM

## 2021-02-02 RX ORDER — LANOLIN ALCOHOL/MO/W.PET/CERES
325 CREAM (GRAM) TOPICAL
Qty: 90 TABLET | Refills: 3 | Status: ON HOLD | OUTPATIENT
Start: 2021-02-03 | End: 2022-09-01 | Stop reason: HOSPADM

## 2021-02-02 RX ORDER — POTASSIUM CHLORIDE 20 MEQ/1
40 TABLET, EXTENDED RELEASE ORAL ONCE
Status: COMPLETED | OUTPATIENT
Start: 2021-02-02 | End: 2021-02-02

## 2021-02-02 RX ORDER — GUAIFENESIN 600 MG/1
600 TABLET, EXTENDED RELEASE ORAL 2 TIMES DAILY
Qty: 14 TABLET | Refills: 0 | Status: SHIPPED | OUTPATIENT
Start: 2021-02-02 | End: 2021-02-09

## 2021-02-02 RX ADMIN — MAGNESIUM SULFATE HEPTAHYDRATE 1000 MG: 1 INJECTION, SOLUTION INTRAVENOUS at 12:59

## 2021-02-02 RX ADMIN — ASPIRIN 81 MG: 81 TABLET, CHEWABLE ORAL at 08:32

## 2021-02-02 RX ADMIN — GUAIFENESIN 600 MG: 600 TABLET, EXTENDED RELEASE ORAL at 20:55

## 2021-02-02 RX ADMIN — DEXTROMETHORPHAN HYDROBROMIDE AND GUAIFENESIN 10 ML: 20; 200 LIQUID ORAL at 18:25

## 2021-02-02 RX ADMIN — POTASSIUM CHLORIDE 40 MEQ: 1500 TABLET, EXTENDED RELEASE ORAL at 08:32

## 2021-02-02 RX ADMIN — FERROUS SULFATE TAB EC 325 MG (65 MG FE EQUIVALENT) 325 MG: 325 (65 FE) TABLET DELAYED RESPONSE at 08:33

## 2021-02-02 RX ADMIN — ATORVASTATIN CALCIUM 10 MG: 10 TABLET, FILM COATED ORAL at 08:33

## 2021-02-02 RX ADMIN — HYDRALAZINE HYDROCHLORIDE 10 MG: 20 INJECTION INTRAMUSCULAR; INTRAVENOUS at 08:51

## 2021-02-02 RX ADMIN — HEPARIN SODIUM 5000 UNITS: 5000 INJECTION INTRAVENOUS; SUBCUTANEOUS at 20:55

## 2021-02-02 RX ADMIN — METOPROLOL TARTRATE 100 MG: 50 TABLET, FILM COATED ORAL at 20:54

## 2021-02-02 RX ADMIN — POTASSIUM CHLORIDE 10 MEQ: 7.46 INJECTION, SOLUTION INTRAVENOUS at 05:59

## 2021-02-02 RX ADMIN — HYDRALAZINE HYDROCHLORIDE 10 MG: 20 INJECTION INTRAMUSCULAR; INTRAVENOUS at 00:41

## 2021-02-02 RX ADMIN — ACETAMINOPHEN 650 MG: 325 TABLET ORAL at 08:51

## 2021-02-02 RX ADMIN — HYDROCODONE BITARTRATE AND ACETAMINOPHEN 1 TABLET: 5; 325 TABLET ORAL at 20:54

## 2021-02-02 RX ADMIN — DEXTROMETHORPHAN HYDROBROMIDE AND GUAIFENESIN 10 ML: 20; 200 LIQUID ORAL at 03:48

## 2021-02-02 RX ADMIN — PANTOPRAZOLE SODIUM 40 MG: 40 TABLET, DELAYED RELEASE ORAL at 08:33

## 2021-02-02 RX ADMIN — HYDROCODONE BITARTRATE AND ACETAMINOPHEN 1 TABLET: 5; 325 TABLET ORAL at 16:36

## 2021-02-02 RX ADMIN — POTASSIUM CHLORIDE 10 MEQ: 7.46 INJECTION, SOLUTION INTRAVENOUS at 07:44

## 2021-02-02 RX ADMIN — HEPARIN SODIUM 5000 UNITS: 5000 INJECTION INTRAVENOUS; SUBCUTANEOUS at 06:01

## 2021-02-02 RX ADMIN — HYDROCODONE BITARTRATE AND ACETAMINOPHEN 1 TABLET: 5; 325 TABLET ORAL at 03:50

## 2021-02-02 RX ADMIN — HEPARIN SODIUM 5000 UNITS: 5000 INJECTION INTRAVENOUS; SUBCUTANEOUS at 13:57

## 2021-02-02 RX ADMIN — DIPHENHYDRAMINE HYDROCHLORIDE 25 MG: 25 TABLET ORAL at 01:52

## 2021-02-02 RX ADMIN — GUAIFENESIN 600 MG: 600 TABLET, EXTENDED RELEASE ORAL at 08:33

## 2021-02-02 RX ADMIN — SODIUM CHLORIDE 1000 MG: 9 INJECTION, SOLUTION INTRAVENOUS at 16:54

## 2021-02-02 RX ADMIN — SODIUM CHLORIDE, PRESERVATIVE FREE 10 ML: 5 INJECTION INTRAVENOUS at 08:36

## 2021-02-02 RX ADMIN — MAGNESIUM SULFATE HEPTAHYDRATE 1000 MG: 1 INJECTION, SOLUTION INTRAVENOUS at 14:00

## 2021-02-02 RX ADMIN — MEROPENEM 500 MG: 500 INJECTION, POWDER, FOR SOLUTION INTRAVENOUS at 03:32

## 2021-02-02 ASSESSMENT — PAIN - FUNCTIONAL ASSESSMENT: PAIN_FUNCTIONAL_ASSESSMENT: ACTIVITIES ARE NOT PREVENTED

## 2021-02-02 ASSESSMENT — ENCOUNTER SYMPTOMS
CONSTIPATION: 0
CHEST TIGHTNESS: 0
WHEEZING: 0
ABDOMINAL PAIN: 0
BLOOD IN STOOL: 0
GASTROINTESTINAL NEGATIVE: 1
NAUSEA: 0
DIARRHEA: 0
SHORTNESS OF BREATH: 0
VOMITING: 0
COUGH: 1
RHINORRHEA: 1

## 2021-02-02 ASSESSMENT — PAIN DESCRIPTION - PROGRESSION
CLINICAL_PROGRESSION: NOT CHANGED
CLINICAL_PROGRESSION: NOT CHANGED

## 2021-02-02 ASSESSMENT — PAIN DESCRIPTION - DESCRIPTORS
DESCRIPTORS: ACHING
DESCRIPTORS: ACHING;DISCOMFORT;SORE
DESCRIPTORS: ACHING

## 2021-02-02 ASSESSMENT — PAIN DESCRIPTION - FREQUENCY: FREQUENCY: CONTINUOUS

## 2021-02-02 ASSESSMENT — PAIN DESCRIPTION - PAIN TYPE
TYPE: ACUTE PAIN
TYPE: ACUTE PAIN

## 2021-02-02 ASSESSMENT — PAIN SCALES - GENERAL
PAINLEVEL_OUTOF10: 5
PAINLEVEL_OUTOF10: 3
PAINLEVEL_OUTOF10: 5
PAINLEVEL_OUTOF10: 5

## 2021-02-02 ASSESSMENT — PAIN DESCRIPTION - ONSET
ONSET: ON-GOING
ONSET: ON-GOING

## 2021-02-02 ASSESSMENT — PAIN DESCRIPTION - LOCATION
LOCATION: GENERALIZED
LOCATION: BACK

## 2021-02-02 NOTE — PLAN OF CARE
Problem: Falls - Risk of:  Goal: Will remain free from falls  Description: Will remain free from falls  Outcome: Ongoing   Pt assessed as a fall risk this shift. Remains free from falls and accidental injury at this time. Fall precautions in place, including falling star sign and fall risk band on pt. Floor free from obstacles, and bed is locked and in lowest position. Adequate lighting provided. Pt encouraged to call before getting OOB for any need. Bed alarm activated. Will continue to monitor needs during hourly rounding, and reinforce education on use of call light. Problem: Sensory:  Goal: General experience of comfort will improve  Description: General experience of comfort will improve  Outcome: Ongoing     Problem: Urinary Elimination:  Goal: Signs and symptoms of infection will decrease  Description: Signs and symptoms of infection will decrease  Outcome: Ongoing     Problem: Pain:  Goal: Pain level will decrease  Description: Pain level will decrease  Outcome: Ongoing   PRN pain medications ordered (See Mar). Problem: Skin Integrity:  Goal: Will show no infection signs and symptoms  Description: Will show no infection signs and symptoms  Outcome: Ongoing   No new skin breakdown this shift.

## 2021-02-02 NOTE — PROGRESS NOTES
Pt calling out appropriately for assist to bathroom; pt voiding without difficulty; assisted back to bed. C/O generalized fatigue, all over aching and congestion headache. States occasional cough with small amt of clear drainage. Administered Tylenol per pt's request.  Pt receiving supplemental Potassium and Magnesium. Pt c/o poor appetite; pt unsure of last BM. Encouraged pt to eat slowly and drink fluids. Will continue to monitor. Bed alarm on for safety. 10:00  Pt passed small amt of loose stool per Craig Setswana PCT.

## 2021-02-02 NOTE — PROGRESS NOTES
[facility]  Office: [address], DO, Jeremiah Keys, DO, Tashachi Parsons, DO, Amy Luther Blood, DO, Dorene Loving MD, Gisela Kaye MD, Juju Mcgee MD, Garret Plaza MD, Audelia Martinez MD, Tiana Rodriguez MD, Mercedes Rodriguez MD, Vero Wynne MD, Artemio Diaz MD, Radha Dhaliwal, DO, Fidencio Worrell MD, Shahla Colin MD, Suzi Fischer, DO, Alton Urias MD,  Roopa Ogden, DO, Porfirio Lock MD, Jeramie Mendez MD, Geoffrey Palma CNP, [place] [address], [facility], Christina Varela, CNP, Leoncio Castellon, CNS, Charles Cardenas, CNP, Josie Harrison, CNP, Lisa Clancy, CNP, Fili Witt, CNP, Carlie Montano, CNP, Clara Rai PA-C, Adrienne Franklin, [facility], Michele Thomas, CNP, Prisca Kerr, CNP, Yumiko Gomez, CNP, Jose Shankar, CNP, Colin Dao [address]32    Progress Note    2/2/2021    12:42 PM    Name:   Eda Zendejas  MRN:     [id number]     Acct:      [de-identified]   Room:   [address] [place] [address] Day:  5  Admit Date:  1/28/2021  7:45 AM    PCP:   Shante Pierce MD  Code Status:  Full Code    Subjective:     C/C:   Chief Complaint   Patient presents with    Fatigue     feeling run down x 3 days.  Cough     clear sputum with cough and nasal congestion    Arm Pain     left arm pain      Interval History Status:  Improved    Patient is sitting up in bed resting. She continues to  complain of aching all over and cough but she would like to go home. She states she feels better than when she got here. She says she is drinking without difficulty, drinking the supplements if she does not eat, and tried a little bit here and there. Denies fever, chills, shortness of breath or chest pain. She does have oxygen on during my assessment. Intermittently throughout her stay RT and nursing has documented hypoxia. Discharge should be today or tomorrow depending on ID.   I have asked RT to do a home oxygen eval        Brief History:     Per previous documentation    Celeste Councilman is a 79 y.o. Non-/non  female who presents with Fatigue (feeling run down x 3 days.), Cough (clear sputum with cough and nasal congestion), and Arm Pain (left arm pain )   and is admitted to the hospital for the management of Acute cystitis with hematuria. According to the ER note the patient presented with complaints of shakiness, fatigue x3 days, nausea and a cough with clear sputum production.  She denied chest pain or shortness of breath but apparently did complain of left arm pain.  During my assessment she complained of shivering, low back pain, urinary frequency, urgency and burning.  When I asked her about the complaints in the ER she denied fatigue, nausea, and left arm pain.  She was also unclear about her home medications but she could tell me she gets her meds filled at [facility] in [facility] [place] [facility]".  According to her problem was the patient does have a history of dementia and her  is not at bedside to confirm any information.      Vitals on arrival to the .1, 18, 105, 95/50.  91% on 2 L nasal cannula.  Chest x-ray shows no acute cardiopulmonary pathology.  Potassium 3.4, BUN/creatinine 22/2.15, mag 1.4.  Initial lactate 3.3 after fluids 1.6.  Initial troponin 30 2 repeat troponin was 45.  Cardiology was consulted BNP 2308.  Echo ordered for tomorrow.  H&H 10.7/33.0 white count normal at 4.5.  Urine shows positive nitrates, large leukocyte Estrace,  WBCs, 5-10 RBCs, many bacteria, moderate amount of urine hemoglobin.  Urine culture pending.  Blood cultures x2 pending.  Patient's received fluid boluses in the ER and has been started on Rocephin 1 g IV daily pending urine cultures.    She has mild bilateral CVA tenderness on assessment.  Plan to repeat troponin, hydrate and replace electrolytes.      Patient has underlying dementia, wants to go home,  Chest x-ray done this morning shows pulmonary edema and BNP was 2300, mild elevated troponin, cardiology saw the patient, will slow down the fluid resuscitation,  Acute kidney injury, will get nephrology on board,  Blood cultures growing [address]. ATB changed to Meropenum and ID was consulted. Patient requires a lot of encouragement to get oob. RT aware of need for IS and pulmonary toileting. New order for PT/OT.     echo shows an EF of greater than 55% with mild left ventricular hypertrophy. (Diastolic heart failure)    MARCOS resolved. Dr. Lizbeth Ariza is following. Per his note the patient will require 10 to 14 days of IV antimicrobial therapy. Home O2 eval.  Home Sectral resumed today (pharmacy exchanged to Lopressor). Replacing electrolytes. If okay with ID plan to discharge patient home today. Review of Systems:     Review of Systems   Constitutional: Positive for fatigue. Negative for chills, diaphoresis and fever (she says she feels feverish). HENT: Negative for congestion. Eyes: Negative for visual disturbance. Respiratory: Positive for cough (Dry nonproductive). Negative for chest tightness, shortness of breath and wheezing. Cardiovascular: Negative for chest pain, palpitations and leg swelling. Gastrointestinal: Negative for abdominal pain, blood in stool, constipation, diarrhea, nausea and vomiting. Genitourinary: Negative for difficulty urinating. Musculoskeletal: Positive for myalgias. Neurological: Negative for dizziness, weakness, light-headedness, numbness and headaches. All other systems reviewed and are negative. Medications: Allergies:     Allergies   Allergen Reactions    Morphine Nausea And Vomiting    Doxycycline Other (See Comments)     Other reaction(s): diarrhea.stomach pain      Azithromycin Other (See Comments)    Ciprofloxacin Other (See Comments)       Current Meds:   Scheduled Meds:    magnesium sulfate  2,000 mg Intravenous Once    metoprolol tartrate  100 mg Oral BID    ferrous sulfate  325 mg Oral Daily with breakfast    lidocaine 1 % injection  5 mL Intradermal Once    sodium chloride flush  10 mL Intravenous 2 times per day    meropenem  500 mg Intravenous Q12H    guaiFENesin  600 mg Oral BID    pantoprazole  40 mg Oral Daily    atorvastatin  10 mg Oral Daily    aspirin  81 mg Oral Daily    sodium chloride flush  10 mL Intravenous 2 times per day    heparin (porcine)  5,000 Units Subcutaneous 3 times per day     Continuous Infusions:     PRN Meds: sodium chloride flush, diphenhydrAMINE, hydrALAZINE, dextromethorphan-guaiFENesin, HYDROcodone 5 mg - acetaminophen, sodium chloride flush, acetaminophen **OR** acetaminophen, polyethylene glycol, ondansetron **OR** ondansetron, magnesium sulfate, potassium chloride **OR** potassium alternative oral replacement **OR** potassium chloride    Data:     Past Medical History:   has a past medical history of Accelerated hypertension, Atypical chest pain, Hyperlipidemia, Hypertension, and Labile blood pressure. Social History:   reports that she has quit smoking. She has never used smokeless tobacco. She reports previous alcohol use. She reports that she does not use drugs. Family History:   Family History   Problem Relation Age of Onset    Heart Disease Mother 78    Alzheimer's Disease Father 67    Heart Attack Brother 62    Heart Disease Brother     Heart Disease Brother        Vitals:  BP (!) 177/78   Pulse 89   Temp 97.4 °F (36.3 °C) (Oral)   Resp 16   Ht 5' 1\" (1.549 m)   Wt 132 lb 11.2 oz (60.2 kg)   SpO2 95%   BMI 25.07 kg/m²   Temp (24hrs), Av.3 °F (36.8 °C), Min:97.4 °F (36.3 °C), Max:99.5 °F (37.5 °C)    No results for input(s): POCGLU in the last 72 hours. I/O (24Hr):     Intake/Output Summary (Last 24 hours) at 2021 1242  Last data filed at 2021 0951  Gross per 24 hour   Intake 800 ml   Output 2450 ml   Net -1650 ml       Labs:  Hematology:  Recent Labs 01/31/21 0623 02/01/21 0548 02/02/21 0443   WBC 10.2 7.8 7.6   RBC 3.88* 3.29* 3.50*   HGB 11.4* 9.8* 10.3*   HCT 35.4* 30.4* 31.2*   MCV 91.1 92.4 89.0   MCH 29.5 29.7 29.4   MCHC 32.4 32.1 33.0   RDW 14.6 14.7 13.9   PLT 94* 94* 97*   MPV 10.2 10.9 10.3     Chemistry:  Recent Labs     01/31/21 0623 02/01/21 0548 02/02/21 0443 02/02/21  1205   * 133* 139  --    K 3.6* 3.6* 3.0* 3.9    105 103  --    CO2 22 21 22  --    GLUCOSE 75 99 97  --    BUN 17 14 11  --    CREATININE 0.97* 0.80 0.61  --    MG  --   --  1.5*  --    ANIONGAP 9 7* 14  --    LABGLOM 55* >60 >60  --    GFRAA >60 >60 >60  --    CALCIUM 9.0 8.5* 8.5*  --      No results for input(s): PROT, LABALBU, LABA1C, K6BUJCA, L8YVQKU, FT4, TSH, AST, ALT, LDH, GGT, ALKPHOS, LABGGT, BILITOT, BILIDIR, AMMONIA, AMYLASE, LIPASE, LACTATE, CHOL, HDL, LDLCHOLESTEROL, CHOLHDLRATIO, TRIG, VLDL, DNV12SG, PHENYTOIN, PHENYF, URICACID, POCGLU in the last 72 hours. ABG:No results found for: POCPH, PHART, PH, POCPCO2, MUU5NYQ, PCO2, POCPO2, PO2ART, PO2, POCHCO3, JQM3SXH, HCO3, NBEA, PBEA, BEART, BE, THGBART, THB, BRL3LBP, XSXX5GWX, U0AMQXGU, O2SAT, FIO2  Lab Results   Component Value Date/Time    SPECIAL NOT REPORTED 01/28/2021 09:42 AM     Lab Results   Component Value Date/Time    CULTURE (A) 01/28/2021 09:42 AM     ESCHERICHIA COLI >[id number] CFU/ML THIS ORGANISM IS AN EXTENDED-SPECTRUM BETA-LACTAMASE  AND RESISTANCE TO THERAPY WITH PENICILLINS, CEPHALOSPORINS AND AZTREONAM IS EXPECTED. THESE ORGANISMS GENERALLY REMAIN SUSCEPTIBLE TO CARBAPENEMS. CONSIDER ID CONSULTATION. Radiology:  Xr Chest Portable    Result Date: 1/29/2021  Interval enlargement of cardiac silhouette and interval development of interstitial opacities with bilateral effusions consistent with pulmonary edema. Xr Chest Portable    Result Date: 1/28/2021  No acute cardiopulmonary pathology.      Us Retroperitoneal Complete    Result Date: 1/28/2021  Normal sonographic appearance of the kidneys. Physical Examination:     Physical Exam  Vitals signs and nursing note reviewed. Constitutional:       Appearance: She is not ill-appearing. HENT:      Mouth/Throat:      Mouth: Mucous membranes are dry. Eyes:      Extraocular Movements: Extraocular movements intact. Cardiovascular:      Rate and Rhythm: Normal rate and regular rhythm. Pulses: Normal pulses. Heart sounds: Normal heart sounds. Pulmonary:      Effort: Pulmonary effort is normal.      Breath sounds: No decreased breath sounds. Comments: Upper airways  lower dim  Abdominal:      General: Bowel sounds are normal.      Palpations: Abdomen is soft. Musculoskeletal: Normal range of motion. Skin:     General: Skin is warm. Capillary Refill: Capillary refill takes more than 3 seconds. Coloration: Skin is not pale. Neurological:      Mental Status: She is alert and oriented to person, place, and time. GCS: GCS eye subscore is 4. GCS verbal subscore is 5. GCS motor subscore is 6. Assessment:     Hospital Problems           Last Modified POA    * (Principal) Acute cystitis with hematuria 1/28/2021 Yes    Hyperlipidemia 1/28/2021 Yes    Cardiomegaly 1/28/2021 Yes    Dementia ([address] 1/28/2021 Yes    Gastroesophageal reflux disease 1/28/2021 Yes    Essential hypertension 1/28/2021 Yes    History of ventricular fibrillation 1/28/2021 Yes    History of recurrent UTIs (Chronic) 1/28/2021 Yes    Elevated brain natriuretic peptide (BNP) level 1/28/2021 Yes    Sepsis ([address] 1/28/2021 Yes    Hypomagnesemia 1/28/2021 Yes    Hypokalemia 1/28/2021 Yes    Dehydration 1/28/2021 Yes    MARCOS (acute kidney injury) ([address] 1/28/2021 Yes    Elevated troponin 1/28/2021 Yes    Hypoxia 1/29/2021 Yes    Gram-negative bacteremia 1/29/2021 Yes    Sepsis due to Escherichia coli with acute renal failure and tubular necrosis without septic shock ([address] 1/29/2021 Yes          Plan:     1.  Sepsis due to underlying ESCHERICHIA COLI THIS ORGANISM IS AN EXTENDED-SPECTRUM BETA-LACTAMASE , ATB changed to Meropenum and ID consulted. 2. Acute CHF with a BNP of 2300, mildly elevated troponin possibly due to type II MI, cardiology on board, echo shows an EF of greater than 55% with mild left ventricular hypertrophy, will continue to monitor fluid status  3. Resume at home Sectral today  4. Acute respiratory failure with hypoxia, needing 2 to 3 L of oxygen, chest x-ray showed pulmonary edema, echo shows some degree of dystolic dysfunction. IV fluids decreased. Weaning oxygen as tolerated. pulmonary toileting. RT and therapy asked to assess need for oxygen at rest and with activity  5. Underlying dementia, without behavioral changes,  6. Acute kidney injury presented with creatinine of 2.15, baseline 0.7, Nephrology following  7. Full CODE STATUS  8. Start iron supplementation   9. DVT prophylaxis  10. Case management working on discharge planning related to IV antibiotic needs.   Plan for discharge soon    RIC Chappell CNP  2/2/2021  12:42 PM

## 2021-02-02 NOTE — FLOWSHEET NOTE
Home Oxygen Evaluation    Home Oxygen Evaluation completed. Patient is on 2.5 liters per minute via nasal cannula. Resting SpO2 = 98%  Resting SpO2 on room air = 93%           Nocturnal Oximetry with patient on room air is recommended is SpO2 is between 89% and 95% (requires additional order).     Stefani Donahue  1:04 PM

## 2021-02-02 NOTE — CARE COORDINATION
Call to [address] [place] [address] to follow on referral - patient is accepted per Austyn Flores, please call when ready for d/c.

## 2021-02-02 NOTE — PROGRESS NOTES
[facility]  Progress Note    Maria C Woods  MRN: [id number]  Date: 2/2/2021  LOS: 5     Reason for F/U :   ESBL E. coli urinary tract infection/sepsis    Impression :   1. ESBL E. coli urinary tract infection and sepsis  2. Nasal congestion, generalized malaise, cough-rule out viral syndrome  3. Acute kidney injury-resolved  4. Possible congestive heart failure by imaging  5. Dementia    Recommendations:   · The patient has been receiving meropenem and I switched her to ertapenem 1 dose today  · The plan is for potential discharge tomorrow on ertapenem to complete a 10-day course of therapy  · I will repeat blood cultures x2 sets. · I will check respiratory panel given the respiratory symptoms she is describing today. · Check chest x-ray x2 views. Infection Control Recommendations:   Contact precautions    Discharge Planning:   Estimated Length of IV antimicrobials: 10 days  Patient will need Midline Catheter Insertion/ PICC line Insertion: No  Patient will need: Home IV , [place],  SNF,  LTAC: Undetermined  Patient willneed outpatient wound care: No    Medical Decision making / Summary of Stay:   Maria C Woods is a 79y.o.-year-old female who was initially admitted on 1/28/2021. Irena Interiano has a history of hypertension, hyperlipidemia, atypical chest pain and she reports that she was in her usual state of health until the day of admission when she woke up with low back pain, dysuria and reports that she \"did not feel good\". She has some generalized malaise and came into the emergency department where she was diagnosed with a urinary tract infection. She was initially started on antimicrobial therapy with Rocephin and blood cultures did also grow gram-negative rods which have all later been identified as an ESBL E. coli. The patient's antimicrobial therapy was switched to meropenem and I was asked to evaluate and help with antibiotic choice.   The patient currently does not report any subjective fevers or chills. She reports some chest and nasal congestion. She reports some generalized fatigue/weakness. She reports some nausea but no abdominal pain vomiting or diarrhea. The dysuria is resolved. Current evaluation:2021    /68   Pulse 64   Temp 98.3 °F (36.8 °C) (Oral)   Resp 20   Ht 5' 1\" (1.549 m)   Wt 132 lb 11.2 oz (60.2 kg)   SpO2 93%   BMI 25.07 kg/m²     Temperature Range: Temp: 98.3 °F (36.8 °C) Temp  Av.2 °F (36.8 °C)  Min: 97.4 °F (36.3 °C)  Max: 99.5 °F (37.5 °C)  The patient is seen and evaluated at bedside she is awake and alert in no mild distress. She reports nasal congestion, coughing, generalized malaise/fatigue. No subjective fevers or chills. No chest pains or palpitations. Review of Systems   Constitutional: Negative. HENT: Positive for rhinorrhea. Respiratory: Positive for cough. Cardiovascular: Negative. Gastrointestinal: Negative. Genitourinary: Negative. Musculoskeletal: Negative. Skin: Negative. Neurological: Negative. Psychiatric/Behavioral: Negative. Physical Examination :     Physical Exam  Constitutional:       Appearance: She is well-developed. HENT:      Head: Normocephalic and atraumatic. Neck:      Musculoskeletal: Neck supple. Cardiovascular:      Rate and Rhythm: Regular rhythm. Heart sounds: Normal heart sounds. Pulmonary:      Effort: Pulmonary effort is normal.      Breath sounds: Normal breath sounds. Abdominal:      General: Bowel sounds are normal.      Palpations: Abdomen is soft. Skin:     General: Skin is warm and dry. Neurological:      Mental Status: She is alert and oriented to person, place, and time.          Laboratory data:   I have independently reviewed the followinglabs:  CBC with Differential:   Recent Labs     2148 21  0443   WBC 7.8 7.6   HGB 9.8* 10.3*   HCT 30.4* 31.2*   PLT 94* 97*     BMP:   Recent Labs     2148 02/02/21  0443 02/02/21  1205 02/02/21  1645   * 139  --   --    K 3.6* 3.0* 3.9  --     103  --   --    CO2 21 22  --   --    BUN 14 11  --   --    CREATININE 0.80 0.61  --   --    MG  --  1.5*  --  2.3     Hepatic Function Panel: No results for input(s): PROT, LABALBU, BILIDIR, IBILI, BILITOT, ALKPHOS, ALT, AST in the last 72 hours. Lab Results   Component Value Date    PROCAL 38.81 01/30/2021    PROCAL 38.02 01/28/2021     No results found for: CRP  No results found for: SEDRATE      Lab Results   Component Value Date    DDIMER 0.62 01/08/2020    DDIMER 0.58 12/15/2019     Lab Results   Component Value Date    FERRITIN 302 01/29/2021     No results found for: LDH  No results found for: FIBRINOGEN    Results in Past 30 Days  Result Component Current Result Ref Range Previous Result Ref Range   SARS-CoV-2, PCR Not Detected (1/29/2021) Not Detected      (1/28/2021)            (1/28/2021)       Not Detected (1/28/2021) Not Detected     Lab Results   Component Value Date    COVID19 Not Detected 01/29/2021    COVID19 Not Detected 01/28/2021       No results for input(s): VANCOTROUGH in the last 72 hours. Imaging Studies:   No new imaging    Cultures:     Culture, Blood 1 [[id number]] (Abnormal) Collected: 01/28/21 0836   Order Status: Completed Specimen: Blood Updated: 01/30/21 1017    Specimen Description . BLOOD    Special Requests 20 ML RIGHT ARM    Culture POSITIVE Blood Culture Results called to and read back by: BAYLEE Bolanos AT 0255 ON 1/29/21Abnormal      DIRECT GRAM STAIN FROM BOTTLE: GRAM NEGATIVE RODS     ESCHERICHIA COLI THIS ORGANISM IS AN EXTENDED-SPECTRUM BETA-LACTAMASE  AND RESISTANCE TO THERAPY WITH PENICILLINS, CEPHALOSPORINS AND AZTREONAM IS EXPECTED.  THESE ORGANISMS GENERALLY REMAIN SUSCEPTIBLE TO CARBAPENEMS.  CONSIDER ID CONSULTATION. For susceptibility, refer to previous culture. Abnormal    Culture, Blood 1 [[id number]] (Abnormal)  Collected: 01/28/21 0855   Order Status: Completed Specimen: Blood Updated: 01/30/21 1016    Specimen Description . BLOOD    Special Requests 3ML RIGHT HAND    Culture POSITIVE Blood Culture Results called to and read back by: Yohana Hair. AT 0201 ON 01 29 2021Abnormal      DIRECT GRAM STAIN FROM BOTTLE: GRAM NEGATIVE RODS     ESCHERICHIA COLI THIS ORGANISM IS AN EXTENDED-SPECTRUM BETA-LACTAMASE  AND RESISTANCE TO THERAPY WITH PENICILLINS, CEPHALOSPORINS AND AZTREONAM IS EXPECTED.  THESE ORGANISMS GENERALLY REMAIN SUSCEPTIBLE TO CARBAPENEMS.  CONSIDER ID CONSULTATION. Abnormal    Culture, Urine [[id number]] (Abnormal)  Collected: 01/28/21 0942   Order Status: Completed Specimen: Urine Random Updated: 01/29/21 2255    Specimen Description . Random Urine    Special Requests NOT REPORTED    Culture ESCHERICHIA COLI >[id number] CFU/ML THIS ORGANISM IS AN EXTENDED-SPECTRUM BETA-LACTAMASE  AND RESISTANCE TO THERAPY WITH PENICILLINS, CEPHALOSPORINS AND AZTREONAM IS EXPECTED.  THESE ORGANISMS GENERALLY REMAIN SUSCEPTIBLE TO CARBAPENEMS.  CONSIDER ID CONSULTATION. Abnormal    Escherichia coli (1)    Antibiotic Interpretation WALLY Status    amikacin Sensitive  Final     <=2   SUSCEPTIBLE   ampicillin Resistant  Final     >=32   RESISTANT   ampicillin-sulbactam   Final     NOT REPORTED    aztreonam Resistant  Final     16   RESISTANT   ceFAZolin Resistant  Final     >=64   RESISTANT   ceFAZolin Resistant Cefazolin sensitivity results can be used to predict the effectiveness of oral cephalosporins (eg.  Cephalexin) in uncomplicated Urinary Tract Infections due to E. coli, K. pneumoniae, and P. mirabilis Final    cefepime Resistant  Final     2   RESISTANT   cefTRIAXone Resistant  Final     >=64   RESISTANT   ciprofloxacin Resistant  Final     >=4   RESISTANT   ertapenem   Final     NOT REPORTED    Confirmatory Extended Spectrum Beta-Lactamase Positive POSITIVE Final    gentamicin Resistant  Final     >=16   RESISTANT   meropenem Sensitive  Final     <=0.25 SUSCEPTIBLE   nitrofurantoin Sensitive  Final     <=16   SUSCEPTIBLE   tigecycline   Final     NOT REPORTED    tobramycin Intermediate  Final     8   INTERMEDIATE   trimethoprim-sulfamethoxazole Resistant  Final     >=320   RESISTANT   piperacillin-tazobactam Resistant  Final     <=4   RESISTANT           Medications:      magnesium sulfate  2,000 mg Intravenous Once    metoprolol tartrate  100 mg Oral BID    ferrous sulfate  325 mg Oral Daily with breakfast    lidocaine 1 % injection  5 mL Intradermal Once    sodium chloride flush  10 mL Intravenous 2 times per day    guaiFENesin  600 mg Oral BID    pantoprazole  40 mg Oral Daily    atorvastatin  10 mg Oral Daily    aspirin  81 mg Oral Daily    sodium chloride flush  10 mL Intravenous 2 times per day    heparin (porcine)  5,000 Units Subcutaneous 3 times per day           [facility]  [address]  [employer] messaging  OFFICE: [phone]      Electronically signed by [address] [place] [address] Street, MD on 2/2/2021 at 6:15 PM  Thank you for allowing us to participate in the care of this patient. Please call with questions. This note iscreated with the assistance of a speech recognition program.  While intending to generate a document that actually reflects the content of the visit, the document can still have some errors including those of syntax andsound a like substitutions which may escape proof reading. In such instances, actual meaning can be extrapolated by contextual diversion.

## 2021-02-02 NOTE — PROGRESS NOTES
Comprehensive Nutrition Assessment    Type and Reason for Visit:  Initial, RD Nutrition Re-Screen/LOS    Nutrition Recommendations/Plan:   1. continue current diet. 2. Continue Ensure Enlive TID with HS snack supplement. 3. Encourage PO intakes   4. Recommend consider up in chair for meals with assistance with meal set up as needed. 5. Recommend consider appetite stimulant if PO intakes remain poor. Nutrition Assessment:  Pt appears mildly malnourished on RD screen for length of stay. Pt reports poor PO intakes since admission. Pt c/o \"racing heartbeat. \" RD encouraged pt to take deep breaths, which pt reported did help. Pt did take a few sips of Ensure Enlive and bites of fruit while in room. Pt unable to identify amount of food she has been eating at meals; however, documentation indicates ~25-50%. RD encouraged PO intakes. Pt states she feels nauseated and \"lousy. \" Pt denies hx of weight loss, which  confirms. Pt may benefit from appetite stimulant if PO intakes continue to be poor.     Malnutrition Assessment:  Malnutrition Status:  Mild malnutrition(possibly associated with advanced aging with memory deficits in addition to acute infection proces)    Context:  Chronic Illness     Findings of the 6 clinical characteristics of malnutrition:  Energy Intake:  Mild decrease in energy intake (Comment)  Weight Loss:  Unable to assess     Body Fat Loss:  1 - Mild body fat loss Triceps, Orbital, Buccal region   Muscle Mass Loss:  1 - Mild muscle mass loss Temples (temporalis), Clavicles (pectoralis & deltoids), Scapula (trapezius)  Fluid Accumulation:        Strength:  Not Performed    Estimated Daily Nutrient Needs:  Energy (kcal):  4686-1579 kcal/d; Weight Used for Energy Requirements:  Current     Protein (g):  60-72 g/d; Weight Used for Protein Requirements:  Current(1.0-1.2g/kg)        Fluid (ml/day):  1500-1680mL/d; Method Used for Fluid Requirements:  1 ml/kcal      Nutrition Related Findings: K+ 3.0      Wounds:  None       Current Nutrition Therapies:    DIET GENERAL;  Dietary Nutrition Supplements: Standard High Calorie Oral Supplement    Anthropometric Measures:  · Height: 5' 1\" (154.9 cm)  · Current Body Weight: 132 lb (59.9 kg)   · Usual Body Weight: 125 lb (56.7 kg)     · Ideal Body Weight: 105 lbs; % Ideal Body Weight 125.7 %   · BMI: 25  · BMI Categories: Normal Weight (BMI 22.0 to 24.9) age over 72       Nutrition Diagnosis:   · Mild malnutrition, In context of chronic, non-illness related related to psychological cause or life stress, inadequate protein-energy intake as evidenced by intake 26-50%, poor intake prior to admission, mild loss of subcutaneous fat, mild muscle loss, nausea      Nutrition Interventions:   Food and/or Nutrient Delivery:  Continue Current Diet, Continue Oral Nutrition Supplement  Nutrition Education/Counseling:  No recommendation at this time   Coordination of Nutrition Care:  Continue to monitor while inpatient, Coordination of Community Care, Interdisciplinary Rounds    Goals:  PO intakes to meet >75% estimated needs.        Nutrition Monitoring and Evaluation:   Behavioral-Environmental Outcomes:  None Identified   Food/Nutrient Intake Outcomes:  Food and Nutrient Intake, Supplement Intake, IVF Intake  Physical Signs/Symptoms Outcomes:  Biochemical Data, Nausea or Vomiting, Meal Time Behavior, Nutrition Focused Physical Findings, Skin, Weight     Discharge Planning:    Continue Oral Nutrition Supplement, Continue current diet     Electronically signed by Shahida Dutta RD, LD on 2/2/21 at 10:43 AM FELY Puentes, JOLLY, LD  Registered Dietitian  Inocencia Ramos  [phone]

## 2021-02-02 NOTE — PROGRESS NOTES
Occupational Therapy  Facility/Department: [facility] MED SURG ICU  Daily Treatment Note  NAME: Billy Melara  : 1940  MRN: [id number]    Date of Service: 2021    Discharge Recommendations:  Patient would benefit from continued therapy after discharge       Assessment   Performance deficits / Impairments: Decreased functional mobility ; Decreased safe awareness;Decreased balance;Decreased ADL status; Decreased endurance;Decreased high-level IADLs;Decreased strength  Prognosis: Good  OT Education: OT Role;Transfer Training;Equipment;Plan of Care;ADL Adaptive Strategies; Energy Conservation;Precautions  REQUIRES OT FOLLOW UP: Yes  Activity Tolerance  Activity Tolerance: Patient limited by fatigue;Patient limited by pain  Safety Devices  Safety Devices in place: Yes  Type of devices: Gait belt;Nurse notified; Bed alarm in place; Left in bed;Call light within reach  Restraints  Initially in place: No         Patient Diagnosis(es): The primary encounter diagnosis was Acute UTI. A diagnosis of Septicemia ([facility] [address] was also pertinent to this visit. has a past medical history of Accelerated hypertension, Atypical chest pain, Hyperlipidemia, Hypertension, and Labile blood pressure. has a past surgical history that includes Hysterectomy; Abdomen surgery; LIGATION OF SAPHENOUS VEIN; and bladder suspension. Restrictions  Restrictions/Precautions  Restrictions/Precautions: Fall Risk, Contact Precautions  Required Braces or Orthoses?: No  Position Activity Restriction  Other position/activity restrictions: Ambulate patient. Up with assistance.      Subjective   General  Chart Reviewed: Yes, Labs, Progress Notes, History and Physical, Imaging  Patient assessed for rehabilitation services?: Yes  Response to previous treatment: Patient reporting fatigue but able to participate  Family / Caregiver Present: Yes()  General Comment  Comments: RN ok'd for therapy this AM. Pt agreeable to participate in session and cooperative throughout. Pain Assessment  Pain Assessment: 0-10  Pain Level: 5  Pain Type: Acute pain  Pain Location: Back  Pain Descriptors: Aching;Discomfort; Sore  Pain Frequency: Continuous  Functional Pain Assessment: Activities are not prevented  Non-Pharmaceutical Pain Intervention(s): Ambulation/Increased Activity; Distraction;Repositioned  Response to Pain Intervention: Patient Satisfied  Vital Signs  Patient Currently in Pain: Yes     Orientation  Orientation  Overall Orientation Status: Within Functional Limits     Objective    ADL  Grooming: Increased time to complete;Contact guard assistance(standing sink side to perform oral care, facial hygiene, comb hair and perform hand hygiene)  Toileting: Increased time to complete;Minimal assistance(for LB clothing mngt, pericare/bottom hygiene, and toilet transfer; use of grab bars)  Additional Comments: Pt required increased time/effort to perform. Balance  Sitting Balance: Stand by assistance  Standing Balance: Contact guard assistance  Standing Balance  Time: ~10 minutes total (~3 minutes at first bout, ~7 minutes at second bout)  Activity: functional mobility to/from bathroom, toileting tasks, standing sink side to perform grooming tasks  Comment: 1x prolonged seated rest break due to fatigue and increased low back pain with activity    Functional Mobility  Functional - Mobility Device: Rolling Walker  Activity: To/from bathroom  Assist Level: Contact guard assistance  Functional Mobility Comments: Mildly unsteady however with no true LOB. Increased time/effort. Min VCs for RW mngt/navigation and safety awareness.     Toilet Transfers  Toilet - Technique: Ambulating(with use of RW)  Equipment Used: Standard toilet(with use of grab bars)  Toilet Transfer: Contact guard assistance     Bed mobility  Supine to Sit: Contact guard assistance  Sit to Supine: Contact guard assistance  Scooting: Contact guard assistance  Comment: HOB raised ~45* and use of hand rails; increased time/effort to perform     Transfers  Sit to stand: Contact guard assistance  Stand to sit: Contact guard assistance  Transfer Comments: Min VCs for proper hand placement/sequencing/safety awareness with use of RW during functional transfers. Increased time/effort.          Cognition  Overall Cognitive Status: [facility]         Plan   Plan  Times per week: 5-6x/wk  Times per day: Daily  Current Treatment Recommendations: Strengthening, Endurance Training, Patient/Caregiver Education & Training, Equipment Evaluation, Education, & procurement, Self-Care / ADL, Balance Training, Home Management Training, Functional Mobility Training, Safety Education & Training      Goals  Short term goals  Time Frame for Short term goals: 14 visits  Short term goal 1: Pt will independently demo good safety awareness throughout fucntional tasks  Short term goal 2: Pt will perform functional transfers/mobility modified independent with use of LRD  Short term goal 3: Pt will perform ADLs modified independent with use of AE / DME PRN  Short term goal 4: Pt will demo 15+ minutes activity tolerance for increased ADL participation       Therapy Time   Individual Concurrent Group Co-treatment   Time In 1028         Time Out 1051         Minutes 23         Timed Code Treatment Minutes: 23 Minutes       Emiliana Jaeger, OTR/L

## 2021-02-03 ENCOUNTER — APPOINTMENT (OUTPATIENT)
Dept: GENERAL RADIOLOGY | Age: 81
DRG: 871 | End: 2021-02-03
Payer: MEDICARE

## 2021-02-03 VITALS
WEIGHT: 130.1 LBS | RESPIRATION RATE: 20 BRPM | SYSTOLIC BLOOD PRESSURE: 139 MMHG | HEIGHT: 61 IN | TEMPERATURE: 97.6 F | OXYGEN SATURATION: 93 % | HEART RATE: 86 BPM | DIASTOLIC BLOOD PRESSURE: 72 MMHG | BODY MASS INDEX: 24.56 KG/M2

## 2021-02-03 PROBLEM — N30.01 ACUTE CYSTITIS WITH HEMATURIA: Status: RESOLVED | Noted: 2021-01-28 | Resolved: 2021-02-03

## 2021-02-03 PROBLEM — E87.6 HYPOKALEMIA: Status: RESOLVED | Noted: 2021-01-28 | Resolved: 2021-02-03

## 2021-02-03 PROBLEM — E83.42 HYPOMAGNESEMIA: Status: RESOLVED | Noted: 2021-01-28 | Resolved: 2021-02-03

## 2021-02-03 PROBLEM — N17.9 AKI (ACUTE KIDNEY INJURY) (HCC): Status: RESOLVED | Noted: 2021-01-28 | Resolved: 2021-02-03

## 2021-02-03 PROBLEM — R09.02 HYPOXIA: Status: RESOLVED | Noted: 2021-01-29 | Resolved: 2021-02-03

## 2021-02-03 PROBLEM — E86.0 DEHYDRATION: Status: RESOLVED | Noted: 2021-01-28 | Resolved: 2021-02-03

## 2021-02-03 LAB
ADENOVIRUS PCR: NOT DETECTED
ANION GAP SERPL CALCULATED.3IONS-SCNC: 6 MMOL/L (ref 9–17)
BORDETELLA PARAPERTUSSIS: NOT DETECTED
BORDETELLA PERTUSSIS PCR: NOT DETECTED
BUN BLDV-MCNC: 10 MG/DL (ref 8–23)
BUN/CREAT BLD: ABNORMAL (ref 9–20)
CALCIUM SERPL-MCNC: 8.6 MG/DL (ref 8.6–10.4)
CHLAMYDIA PNEUMONIAE BY PCR: NOT DETECTED
CHLORIDE BLD-SCNC: 103 MMOL/L (ref 98–107)
CO2: 27 MMOL/L (ref 20–31)
CORONAVIRUS 229E PCR: NOT DETECTED
CORONAVIRUS HKU1 PCR: NOT DETECTED
CORONAVIRUS NL63 PCR: NOT DETECTED
CORONAVIRUS OC43 PCR: NOT DETECTED
CREAT SERPL-MCNC: 0.54 MG/DL (ref 0.5–0.9)
GFR AFRICAN AMERICAN: >60 ML/MIN
GFR NON-AFRICAN AMERICAN: >60 ML/MIN
GFR SERPL CREATININE-BSD FRML MDRD: ABNORMAL ML/MIN/{1.73_M2}
GFR SERPL CREATININE-BSD FRML MDRD: ABNORMAL ML/MIN/{1.73_M2}
GLUCOSE BLD-MCNC: 105 MG/DL (ref 70–99)
HCT VFR BLD CALC: 32 % (ref 36–46)
HEMOGLOBIN: 10.4 G/DL (ref 12–16)
HUMAN METAPNEUMOVIRUS PCR: NOT DETECTED
INFLUENZA A BY PCR: NOT DETECTED
INFLUENZA A H1 (2009) PCR: NORMAL
INFLUENZA A H1 PCR: NORMAL
INFLUENZA A H3 PCR: NORMAL
INFLUENZA B BY PCR: NOT DETECTED
MCH RBC QN AUTO: 29.2 PG (ref 26–34)
MCHC RBC AUTO-ENTMCNC: 32.5 G/DL (ref 31–37)
MCV RBC AUTO: 89.9 FL (ref 80–100)
MYCOPLASMA PNEUMONIAE PCR: NOT DETECTED
NRBC AUTOMATED: ABNORMAL PER 100 WBC
PARAINFLUENZA 1 PCR: NOT DETECTED
PARAINFLUENZA 2 PCR: NOT DETECTED
PARAINFLUENZA 3 PCR: NOT DETECTED
PARAINFLUENZA 4 PCR: NOT DETECTED
PDW BLD-RTO: 14.2 % (ref 12.5–15.4)
PLATELET # BLD: 120 K/UL (ref 140–450)
PMV BLD AUTO: 10.6 FL (ref 6–12)
POTASSIUM SERPL-SCNC: 3.7 MMOL/L (ref 3.7–5.3)
RBC # BLD: 3.56 M/UL (ref 4–5.2)
RESP SYNCYTIAL VIRUS PCR: NOT DETECTED
RHINO/ENTEROVIRUS PCR: NOT DETECTED
SARS-COV-2, PCR: NOT DETECTED
SODIUM BLD-SCNC: 136 MMOL/L (ref 135–144)
SPECIMEN DESCRIPTION: NORMAL
WBC # BLD: 6 K/UL (ref 3.5–11)

## 2021-02-03 PROCEDURE — 97535 SELF CARE MNGMENT TRAINING: CPT

## 2021-02-03 PROCEDURE — 6360000002 HC RX W HCPCS: Performed by: INTERNAL MEDICINE

## 2021-02-03 PROCEDURE — 2580000003 HC RX 258: Performed by: NURSE PRACTITIONER

## 2021-02-03 PROCEDURE — 80048 BASIC METABOLIC PNL TOTAL CA: CPT

## 2021-02-03 PROCEDURE — 97530 THERAPEUTIC ACTIVITIES: CPT

## 2021-02-03 PROCEDURE — 94667 MNPJ CHEST WALL 1ST: CPT

## 2021-02-03 PROCEDURE — 2580000003 HC RX 258: Performed by: INTERNAL MEDICINE

## 2021-02-03 PROCEDURE — 97116 GAIT TRAINING THERAPY: CPT

## 2021-02-03 PROCEDURE — 71046 X-RAY EXAM CHEST 2 VIEWS: CPT

## 2021-02-03 PROCEDURE — 6370000000 HC RX 637 (ALT 250 FOR IP): Performed by: NURSE PRACTITIONER

## 2021-02-03 PROCEDURE — 36415 COLL VENOUS BLD VENIPUNCTURE: CPT

## 2021-02-03 PROCEDURE — 6360000002 HC RX W HCPCS: Performed by: NURSE PRACTITIONER

## 2021-02-03 PROCEDURE — 85027 COMPLETE CBC AUTOMATED: CPT

## 2021-02-03 PROCEDURE — APPSS30 APP SPLIT SHARED TIME 16-30 MINUTES: Performed by: NURSE PRACTITIONER

## 2021-02-03 RX ORDER — ACETAMINOPHEN 500 MG
1000 TABLET ORAL EVERY 6 HOURS PRN
Status: DISCONTINUED | OUTPATIENT
Start: 2021-02-03 | End: 2021-02-03 | Stop reason: HOSPADM

## 2021-02-03 RX ORDER — IBUPROFEN 800 MG/1
800 TABLET ORAL ONCE
Status: COMPLETED | OUTPATIENT
Start: 2021-02-03 | End: 2021-02-03

## 2021-02-03 RX ORDER — FLUTICASONE PROPIONATE 50 MCG
2 SPRAY, SUSPENSION (ML) NASAL DAILY
Status: DISCONTINUED | OUTPATIENT
Start: 2021-02-03 | End: 2021-02-03 | Stop reason: HOSPADM

## 2021-02-03 RX ADMIN — ACETAMINOPHEN 1000 MG: 500 TABLET ORAL at 12:22

## 2021-02-03 RX ADMIN — FERROUS SULFATE TAB EC 325 MG (65 MG FE EQUIVALENT) 325 MG: 325 (65 FE) TABLET DELAYED RESPONSE at 12:23

## 2021-02-03 RX ADMIN — GUAIFENESIN 600 MG: 600 TABLET, EXTENDED RELEASE ORAL at 12:23

## 2021-02-03 RX ADMIN — SODIUM CHLORIDE 1000 MG: 9 INJECTION, SOLUTION INTRAVENOUS at 17:24

## 2021-02-03 RX ADMIN — ATORVASTATIN CALCIUM 10 MG: 10 TABLET, FILM COATED ORAL at 12:23

## 2021-02-03 RX ADMIN — PANTOPRAZOLE SODIUM 40 MG: 40 TABLET, DELAYED RELEASE ORAL at 12:23

## 2021-02-03 RX ADMIN — METOPROLOL TARTRATE 100 MG: 50 TABLET, FILM COATED ORAL at 12:22

## 2021-02-03 RX ADMIN — HEPARIN SODIUM 5000 UNITS: 5000 INJECTION INTRAVENOUS; SUBCUTANEOUS at 14:34

## 2021-02-03 RX ADMIN — FLUTICASONE PROPIONATE 2 SPRAY: 50 SPRAY, METERED NASAL at 12:22

## 2021-02-03 RX ADMIN — SODIUM CHLORIDE, PRESERVATIVE FREE 10 ML: 5 INJECTION INTRAVENOUS at 12:23

## 2021-02-03 RX ADMIN — HEPARIN SODIUM 5000 UNITS: 5000 INJECTION INTRAVENOUS; SUBCUTANEOUS at 05:54

## 2021-02-03 RX ADMIN — ASPIRIN 81 MG: 81 TABLET, CHEWABLE ORAL at 12:23

## 2021-02-03 RX ADMIN — IBUPROFEN 800 MG: 800 TABLET, FILM COATED ORAL at 17:33

## 2021-02-03 RX ADMIN — ACETAMINOPHEN 650 MG: 325 TABLET ORAL at 00:43

## 2021-02-03 ASSESSMENT — PAIN DESCRIPTION - LOCATION: LOCATION: BACK

## 2021-02-03 ASSESSMENT — PAIN SCALES - GENERAL
PAINLEVEL_OUTOF10: 6
PAINLEVEL_OUTOF10: 5
PAINLEVEL_OUTOF10: 3
PAINLEVEL_OUTOF10: 7
PAINLEVEL_OUTOF10: 4

## 2021-02-03 ASSESSMENT — PAIN DESCRIPTION - DESCRIPTORS
DESCRIPTORS: ACHING;DISCOMFORT
DESCRIPTORS: ACHING;DISCOMFORT

## 2021-02-03 ASSESSMENT — PAIN DESCRIPTION - PAIN TYPE
TYPE: ACUTE PAIN
TYPE: ACUTE PAIN

## 2021-02-03 ASSESSMENT — PAIN DESCRIPTION - ORIENTATION: ORIENTATION: LOWER;MID

## 2021-02-03 NOTE — PROGRESS NOTES
Pt resting in recliner chair; states she feels a little better today but still has c/o congestion and achiness; Tylenol administered per pt's request.  Pt has been voiding without difficulty - she is not saving her urine. Pt to be discharged this evening to home; to receive IV antibiotics prior to going home.

## 2021-02-03 NOTE — DISCHARGE INSTR - COC
Continuity of Care Form    Patient Name: Eda Zendejas   :  1940  MRN:  [id number]    Admit date:  2021  Discharge date:  2021    Code Status Order: Full Code   Advance Directives:   [facility] Documentation       Date/Time Healthcare Directive Type of Healthcare Directive Copy in [facility] [address] Agent's Name Healthcare Agent's Phone Number    21 1223  No, patient does not have an advance directive for healthcare treatment -- -- -- -- --            Admitting Physician:  Shahla Colin MD  PCP: Shante Pierce MD    Discharging Nurse: Elidia Contreras. [facility] Unit/Room#: 328/328-01  Discharging Unit Phone Number: [phone]    Emergency Contact:   Extended Emergency Contact Information  Primary Emergency Contact: SHAYE Guzman 80, [facility]  Address: [address] [place] [address]. [address] Phone: [phone]  Relation: Spouse    Past Surgical History:  Past Surgical History:   Procedure Laterality Date    ABDOMEN SURGERY      colon resection    BLADDER SUSPENSION      HYSTERECTOMY      LIGATION OF SAPHENOUS VEIN         Immunization History: There is no immunization history on file for this patient.     Active Problems:  Patient Active Problem List   Diagnosis Code    Hiatal hernia K44.9    Hyperlipidemia E78.5    Diverticulitis of colon K57.32    Anxiety F41.9    Insomnia G47.00    Lumbar radiculopathy M54.16    Major depression, single episode F32.9    Memory problem R41.3    Osteoporosis M81.0    Primary osteoarthritis M19.91    Rosacea L71.9    Venous thromboembolism (VTE) I82.90    Chronic frontal sinusitis J32.1    Accelerated hypertension I10    Hyponatremia E87.1    Atypical chest pain R07.89    Labile blood pressure R09.89    Acute cystitis with hematuria N30.01    Cardiomegaly I51.7    Dementia (HCC) F03.90    Gastroesophageal reflux disease K21.9    Essential hypertension I10 Care Documentation and Therapy:        Elimination:  Continence:   · Bowel: Yes  · Bladder: Yes  Urinary Catheter: None   Colostomy/Ileostomy/Ileal Conduit: No       Date of Last BM: 02/02/2021    Intake/Output Summary (Last 24 hours) at 2/3/2021 0820  Last data filed at 2/2/2021 1829  Gross per 24 hour   Intake 2300 ml   Output 1100 ml   Net 1200 ml     I/O last 3 completed shifts: In: 2300 [P.O.:600; I.V.:1700]  Out: 1100 [Urine:1100]    Safety Concerns: At Risk for Falls    Impairments/Disabilities:      None    Nutrition Therapy:  Current Nutrition Therapy:   - Oral Diet:  General    Routes of Feeding: Oral  Liquids: No Restrictions  Daily Fluid Restriction: no  Last Modified Barium Swallow with Video (Video Swallowing Test): not done    Treatments at the Time of Hospital Discharge:   Respiratory Treatments  Oxygen Therapy:  is not on home oxygen therapy.   Ventilator:    - No ventilator support    Rehab Therapies: n/a  Weight Bearing Status/Restrictions: No weight bearing restirctions  Other Medical Equipment (for information only, NOT a DME order):  N/a    Other Treatments:   IV antibiotics    Patient's personal belongings (please select all that are sent with patient):  Glasses    RN SIGNATURE:  Electronically signed by Dahiana De Paz RN on 2/3/21 at 5:50 PM EST    CASE MANAGEMENT/SOCIAL WORK SECTION    Inpatient Status Date: 1/28/2021    Readmission Risk Assessment Score:  Readmission Risk              Risk of Unplanned Readmission:        17           Discharging to Facility/ Agency   · Name: [facility]   · Address:  · Phone: [phone]  · Fax:    Dialysis Facility (if applicable)   · Name:  · Address:  · Dialysis Schedule:  · Phone:  · Fax:    / signature: Electronically signed by Hermann Wei RN on 2/3/21 at 8:21 AM EST    PHYSICIAN SECTION    Prognosis: Good    Condition at Discharge: Stable    Rehab Potential (if transferring to Rehab): Good    Recommended Labs or Other Treatments After Discharge:     Monitor vital signs  IV Invanz as ordered. Routine midline PIV care. Recommend D- Liliane or cranberry supplementation for recurrent UTI  Monitor hydration status  PT/OT   Physician Certification: I certify the above information and transfer of Jose Calloway  is necessary for the continuing treatment of the diagnosis listed and that she requires [address] for less 30 days.      Update Admission H&P: No change in H&P    PHYSICIAN SIGNATURE:  Electronically signed by Jonathon Bennett MD on 2/3/21 at 3:27 PM EST

## 2021-02-03 NOTE — DISCHARGE SUMMARY
[facility]  Office: [address], DO, Cat Arambula DO, Rashad Sears, DO, Clayton Vogel, DO, Melissa Ng MD, Nerissa Schmidt MD, Omid Whittaker MD, Toni Card MD, Sherie Norris MD, Zana Anna MD, Adrianne Germain MD, Malissa Arriaza MD, Artemio Littlejohn MD, Ambar Benitez, DO, Gaetano Ward MD, Mekhi Brown MD, Karla Sinclair, DO, Selina Cunningham MD,  Boris Martin DO, Zackery aGn MD, Martin Dia MD, Lary Villalba, [facility], [facility], CNP, [facility] Jennifer, CNP, Yamilet Georges, CNS, Duane Bottcher, CNP, Naren Lobo, CNP, Omar Cisneros, CNP, Davy Chin, CNP, Medina Miller, CNP, Darion Georges PA-C, Dearl Yesica, [facility], Zabrina Wheeler, CNP, Brigitte Mckinney, CNP, Eduardo Whittaker, CNP, Casper Vera, [facility], Colin Maldonado [address]    Discharge Summary     Patient ID: Jessica Salmeron  :  1940   MRN: [id number]     ACCOUNT:  [de-identified]   Patient's PCP: Della Pereyra MD  Admit Date: 2021   Discharge Date: 2/3/2021  Length of Stay: 6  Code Status:  Full Code  Admitting Physician: Mekhi Brown MD  Discharge Physician: RIC Barber NP     Active Discharge Diagnoses:     Hospital Problem Lists:  Principal Problem (Resolved):    Acute cystitis with hematuria  Active Problems:    Hyperlipidemia    Cardiomegaly    Dementia ([facility] [address]    Gastroesophageal reflux disease    Essential hypertension    History of ventricular fibrillation    History of recurrent UTIs    Elevated brain natriuretic peptide (BNP) level    Sepsis (HCC)    Elevated troponin    Gram-negative bacteremia    Sepsis due to Escherichia coli with acute renal failure and tubular necrosis without septic shock ([facility] [address]  Resolved Problems:    Hypomagnesemia    Hypokalemia    Dehydration    MARCOS (acute kidney injury) ([facility] [address]    Hypoxia      Admission Condition:  fair    Discharged Condition: fair    Hospital Stay:     Hospital Course: Lexy Neves is a 79 y.o. female who was admitted for the management of  Acute cystitis with hematuria , presented to ER with Fatigue (feeling run down x 3 days.), Cough (clear sputum with cough and nasal congestion), and Arm Pain (left arm pain )    Urinary symptoms included low back pain, urinary frequency, urgency, and burning. She has a known history of dementia. On presentation to the ED she was febrile with temp of 103.1, heart rate 105, blood pressure 95/50, and SPO2 91% on 2 L O2 nasal cannula. Urine was positive for UTI. Urine culture revealed ESBL E. Coli. ID was consulted and patient was started on IV Merrem. Patient ultimately discharged home on daily IV Invanz per infectious disease recommendations. Significant therapeutic interventions:     IV hydration  Electrolyte replacement  Monitoring of renal function  Supplemental O2. Home O2 eval completed and patient did not qualify for home O2.   Infectious disease consultation evaluation  IV meropenem  IV Invanz  Pulmonary toilet, incentive spirometry, Acapella    Significant Diagnostic Studies: CXR as below    Labs / Micro:  CBC:   Lab Results   Component Value Date    WBC 6.0 02/03/2021    RBC 3.56 02/03/2021    HGB 10.4 02/03/2021    HCT 32.0 02/03/2021    MCV 89.9 02/03/2021    MCH 29.2 02/03/2021    MCHC 32.5 02/03/2021    RDW 14.2 02/03/2021     02/03/2021     BMP:    Lab Results   Component Value Date    GLUCOSE 105 02/03/2021     02/03/2021    K 3.7 02/03/2021     02/03/2021    CO2 27 02/03/2021    ANIONGAP 6 02/03/2021    BUN 10 02/03/2021    CREATININE 0.54 02/03/2021    BUNCRER NOT REPORTED 02/03/2021    CALCIUM 8.6 02/03/2021    LABGLOM >60 02/03/2021    GFRAA >60 02/03/2021    GFR      02/03/2021    GFR NOT REPORTED 02/03/2021     U/A:    Lab Results   Component Value Date    COLORU NAHID 01/28/2021    TURBIDITY CLOUDY 01/28/2021    SPECGRAV 1.010 01/28/2021    HGBUR MODERATE 01/28/2021    PHUR 5.5 01/28/2021 PROTEINU 2+ 01/28/2021    GLUCOSEU TRACE 01/28/2021    KETUA NEGATIVE 01/28/2021    BILIRUBINUR NEGATIVE  Verified by ictotest. 01/28/2021    UROBILINOGEN ELEVATED 01/28/2021    [address] POSITIVE 01/28/2021    LEUKOCYTESUR LARGE 01/28/2021        Radiology:  Xr Chest (2 Vw)    Result Date: 2/3/2021  Continued small bilateral pleural effusions with associated left basilar atelectasis. Underlying left basilar infiltrate cannot be completely excluded. Appears to been improvement of pulmonary vascular congestion with mild residual vascular prominence. Xr Chest Portable    Result Date: 1/29/2021  Interval enlargement of cardiac silhouette and interval development of interstitial opacities with bilateral effusions consistent with pulmonary edema. Xr Chest Portable    Result Date: 1/28/2021  No acute cardiopulmonary pathology. Us Retroperitoneal Complete    Result Date: 1/28/2021  Normal sonographic appearance of the kidneys. Consultations:    Consults:     Final Specialist Recommendations/Findings:   IP CONSULT TO CARDIOLOGY  IP CONSULT TO NEPHROLOGY  IP CONSULT TO INFECTIOUS DISEASES      The patient was seen and examined on day of discharge and this discharge summary is in conjunction with any daily progress note from day of discharge.     Discharge plan:     Disposition: Home    Physician Follow Up:     Arin Meadows MD  [address] [address]  [phone]    Schedule an appointment as soon as possible for a visit in 1 week  Follow up within 1 week       Requiring Further Evaluation/Follow Up POST HOSPITALIZATION/Incidental Findings: NA    Diet: regular diet    Activity: As tolerated    Instructions to Patient:     Take medications as prescribed    Follow-up with PCP within 1 week    Discharge Medications:      Medication List      START taking these medications    ertapenem  infusion  Commonly known as: INVANZ  Infuse 1,000 mg intravenously every 24 hours for 7 days Compound per protocol. ferrous sulfate 325 (65 Fe) MG EC tablet  Commonly known as: FE TABS 325  Take 1 tablet by mouth daily (with breakfast)     guaiFENesin 600 MG extended release tablet  Commonly known as: MUCINEX  Take 1 tablet by mouth 2 times daily for 7 days        CONTINUE taking these medications    acebutolol 400 MG capsule  Commonly known as: SECTRAL     aspirin 81 MG chewable tablet     estradiol 0.5 MG tablet  Commonly known as: ESTRACE     FISH OIL PO     GLUCOSAMINE-CHONDROITIN PLUS PO     lovastatin 20 MG tablet  Commonly known as: MEVACOR     pantoprazole 40 MG tablet  Commonly known as: PROTONIX  Take 1 tablet by mouth daily     traZODone 50 MG tablet  Commonly known as: DESYREL     Vitamin D3 50 MCG (2000 UT) Caps           Where to Get Your Medications      These medications were sent to [address] [place] [address] [facility], [address]  [address] [place] [address], [address] [place] [address] [address]    Phone: [phone]   · ferrous sulfate 325 (65 Fe) MG EC tablet  · guaiFENesin 600 MG extended release tablet     You can get these medications from any pharmacy    Bring a paper prescription for each of these medications  · ertapenem  infusion         Discharge Procedure Orders   External Referral To Home Health   Referral Priority: Routine Referral Type: [facility]   Referral Reason: Specialty Services Required   Requested Specialty: [address]   Number of Visits Requested: 1     OT home evaluation   Standing Status: Future Standing Exp. Date: 02/03/22     PT home eval   Standing Status: Future Standing Exp. Date: 02/03/22     MT NURSING CARE IN HOME RN       Time Spent on discharge is  25 mins in patient examination, evaluation, counseling as well as medication reconciliation, prescriptions for required medications, discharge plan and follow up.     Electronically signed by   RIC Guzman NP  2/3/2021  5:57 PM      Thank you Dr. Roman Gabriel MD for the opportunity to be involved in this patient's care.

## 2021-02-03 NOTE — PLAN OF CARE
Problem: Falls - Risk of:  Goal: Will remain free from falls  Description: Will remain free from falls  2/3/2021 0243 by Lang Palomino RN  Outcome: Ongoing  2/2/2021 1525 by Theresa Russell RN  Outcome: Ongoing  Goal: Absence of physical injury  Description: Absence of physical injury  Outcome: Ongoing     Problem: Sensory:  Goal: General experience of comfort will improve  Description: General experience of comfort will improve  2/3/2021 0243 by Lang Palomino RN  Outcome: Ongoing  2/2/2021 1525 by Theresa Russell RN  Outcome: Ongoing     Problem: Urinary Elimination:  Goal: Signs and symptoms of infection will decrease  Description: Signs and symptoms of infection will decrease  2/3/2021 0243 by Lang Palomino RN  Outcome: Ongoing  2/2/2021 1525 by Theresa Russell RN  Outcome: Ongoing  Goal: Ability to reestablish a normal urinary elimination pattern will improve - after catheter removal  Description: Ability to reestablish a normal urinary elimination pattern will improve  Outcome: Ongoing  Goal: Complications related to the disease process, condition or treatment will be avoided or minimized  Description: Complications related to the disease process, condition or treatment will be avoided or minimized  Outcome: Ongoing     Problem: Pain:  Goal: Pain level will decrease  Description: Pain level will decrease  Outcome: Ongoing  Goal: Control of acute pain  Description: Control of acute pain  2/3/2021 0243 by Lang Palomino RN  Outcome: Ongoing  2/2/2021 1525 by Theresa Russell RN  Outcome: Ongoing  Goal: Control of chronic pain  Description: Control of chronic pain  Outcome: Ongoing     Problem: Nutrition  Goal: Optimal nutrition therapy  Description: Nutrition Problem #1: Mild malnutrition, In context of chronic, non-illness related  Intervention: Food and/or Nutrient Delivery: Continue Current Diet, Continue Oral Nutrition Supplement  Nutritional Goals: PO intakes to meet >75% estimated

## 2021-02-03 NOTE — PLAN OF CARE
Problem: Falls - Risk of:  Goal: Will remain free from falls  Description: Will remain free from falls  2/3/2021 1446 by Kristen Junior RN  Outcome: Ongoing     Problem: Sensory:  Goal: General experience of comfort will improve  Description: General experience of comfort will improve  2/3/2021 1446 by Kristen Junior RN  Outcome: Ongoing     Problem: Urinary Elimination:  Goal: Complications related to the disease process, condition or treatment will be avoided or minimized  Description: Complications related to the disease process, condition or treatment will be avoided or minimized  2/3/2021 1446 by Kristen Junior RN  Outcome: Met This Shift     Problem: Pain:  Goal: Control of acute pain  Description: Control of acute pain  2/3/2021 1446 by Kristen Junior RN  Outcome: Ongoing     Problem: Nutrition  Goal: Optimal nutrition therapy  Description: Nutrition Problem #1: Mild malnutrition, In context of chronic, non-illness related  Intervention: Food and/or Nutrient Delivery: Continue Current Diet, Continue Oral Nutrition Supplement  Nutritional Goals: PO intakes to meet >75% estimated needs.       2/3/2021 1446 by Kristen Junior RN  Outcome: Ongoing

## 2021-02-03 NOTE — CARE COORDINATION
Discharge 1 [facility] Case Management Department  Written by: Keaton Coronel RN    Patient Name: Isaiah Dennis  Attending Provider: Chepe Cobian MD  Admit Date: 2021  7:45 AM  MRN: [id number]  Account: [de-identified]                     : 1940  Discharge Date: 2/3/2021      Disposition: home - [place] [id number] for IV - updated with d/c today - open tomorrow for infusion [facility]

## 2021-02-03 NOTE — PROGRESS NOTES
[facility]  Office: [address], DO, Mulugeta Duque, DO, Cora Harris, DO, [facility], DO, Christina Mccracken MD, Cathy Faith MD, Kiah Gibbs MD, Kelvin Manzo MD, Nahun Kaye MD, Dipti Ramires MD, Chilo Wolfe MD, Alvino Pulido MD, Artemio Jose MD, Hayden Lai, DO, Leonard Oliver MD, Emerald Schreiber MD, Annmarie Travis, DO, Vicki Madrid MD,  Matt Myers DO, Cliff Patel MD, Martin Yang MD, Derick Kaur, [facility], [facility], [facility], Krista Vallejo, CNP, Koko Ching, CNS, Kodi Mason, CNP, Cheri Louis, CNP, Basilio Fisher, CNP, Bere Villafana, CNP, Britta Amin, CNP, Monika Mccoy PA-C, Laurena Habermann, [facility], Zeenat Asif, CNP, Anoop Valladares, CNP, Chidi Lofton, CNP, Facundo Macedo, CNP, Colin Perez 1732    Progress Note    2/3/2021    1:19 PM    Name:   Francisco J Choe  MRN:     [id number]     Acct:      [de-identified]   Room:   [address] [place] [address] Day:  6  Admit Date:  1/28/2021  7:45 AM    PCP:   Josefina Hernandez MD  Code Status:  Full Code    Subjective:     C/C: Headache, body aches, cough, runny nose  Chief Complaint   Patient presents with    Fatigue     feeling run down x 3 days.  Cough     clear sputum with cough and nasal congestion    Arm Pain     left arm pain      Interval History Status: not changed. Patient resting in bed. She complains of runny nose, headache, hurting from head to her toes, and cough. She has no complaints of shortness of breath, chest pain, abdominal pain, nausea, vomiting. No issues reported overnight per nursing. She is afebrile. Brief History:     Patient is an 67-year-old  female who presented with fatigue, cough and left arm pain and was admitted to the hospital for the management of acute cystitis with hematuria. Urinary symptoms included low back pain, urinary frequency, urgency, and burning.   She has a known history of dementia. On presentation to the ED she was febrile with temp of 103.1, heart rate 105, blood pressure 95/50, and SPO2 91% on 2 L O2 nasal cannula. Urine was positive for UTI. Urine culture revealed ESBL E. Coli. ID was consulted and patient was started on IV Merrem. Discharge planning in progress for patient to be discharged home on daily IV Invanz per infectious disease recommendations. Review of Systems:     Constitutional:  negative for chills, fevers, sweats  Respiratory:  negative for  dyspnea on exertion, shortness of breath, wheezing, + cough  Cardiovascular:  negative for chest pain, chest pressure/discomfort, lower extremity edema, palpitations  Gastrointestinal:  negative for abdominal pain, constipation, diarrhea, nausea, vomiting  Neurological:  negative for dizziness, + headache    Medications: Allergies:     Allergies   Allergen Reactions    Morphine Nausea And Vomiting    Doxycycline Other (See Comments)     Other reaction(s): diarrhea.stomach pain      Azithromycin Other (See Comments)    Ciprofloxacin Other (See Comments)       Current Meds:   Scheduled Meds:    ertapenem (INVanz) IVPB  1,000 mg Intravenous Once    fluticasone  2 spray Each Nostril Daily    magnesium sulfate  2,000 mg Intravenous Once    metoprolol tartrate  100 mg Oral BID    ferrous sulfate  325 mg Oral Daily with breakfast    lidocaine 1 % injection  5 mL Intradermal Once    sodium chloride flush  10 mL Intravenous 2 times per day    guaiFENesin  600 mg Oral BID    pantoprazole  40 mg Oral Daily    atorvastatin  10 mg Oral Daily    aspirin  81 mg Oral Daily    sodium chloride flush  10 mL Intravenous 2 times per day    heparin (porcine)  5,000 Units Subcutaneous 3 times per day     Continuous Infusions:   PRN Meds: acetaminophen, sodium chloride flush, diphenhydrAMINE, hydrALAZINE, dextromethorphan-guaiFENesin, HYDROcodone 5 mg - acetaminophen, sodium chloride flush, polyethylene glycol, ondansetron **OR** ondansetron, magnesium sulfate, potassium chloride **OR** potassium alternative oral replacement **OR** potassium chloride    Data:     Past Medical History:   has a past medical history of Accelerated hypertension, Atypical chest pain, Hyperlipidemia, Hypertension, and Labile blood pressure. Social History:   reports that she has quit smoking. She has never used smokeless tobacco. She reports previous alcohol use. She reports that she does not use drugs. Family History:   Family History   Problem Relation Age of Onset    Heart Disease Mother 78    Alzheimer's Disease Father 67    Heart Attack Brother 62    Heart Disease Brother     Heart Disease Brother        Vitals:  /72   Pulse 86   Temp 97.6 °F (36.4 °C) (Oral)   Resp 20   Ht 5' 1\" (1.549 m)   Wt 130 lb 1.6 oz (59 kg)   SpO2 93%   BMI 24.58 kg/m²   Temp (24hrs), Av °F (36.7 °C), Min:97.5 °F (36.4 °C), Max:98.4 °F (36.9 °C)    No results for input(s): POCGLU in the last 72 hours. I/O (24Hr):     Intake/Output Summary (Last 24 hours) at 2/3/2021 1319  Last data filed at 2021 1829  Gross per 24 hour   Intake 2060 ml   Output 300 ml   Net 1760 ml       Labs:  Hematology:  Recent Labs     21  0548 21  0443 21  0510   WBC 7.8 7.6 6.0   RBC 3.29* 3.50* 3.56*   HGB 9.8* 10.3* 10.4*   HCT 30.4* 31.2* 32.0*   MCV 92.4 89.0 89.9   MCH 29.7 29.4 29.2   MCHC 32.1 33.0 32.5   RDW 14.7 13.9 14.2   PLT 94* 97* 120*   MPV 10.9 10.3 10.6     Chemistry:  Recent Labs     21  0548 21  0443 21  1205 21  1645 21  0510   * 139  --   --  136   K 3.6* 3.0* 3.9  --  3.7    103  --   --  103   CO2 21 22  --   --  27   GLUCOSE 99 97  --   --  105*   BUN 14 11  --   --  10   CREATININE 0.80 0.61  --   --  0.54   MG  --  1.5*  --  2.3  --    ANIONGAP 7* 14  --   --  6*   LABGLOM >60 >60  --   --  >60   GFRAA >60 >60  --   --  >60   CALCIUM 8.5* 8.5*  --   --  8.6     ABG:  Lab Results   Component Value Date/Time    SPECIAL 6ML LEFT HAND 02/02/2021 07:25 PM     Lab Results   Component Value Date/Time    CULTURE NO GROWTH 9 HOURS 02/02/2021 07:25 PM       Radiology:  Xr Chest (2 Vw)    Result Date: 2/3/2021  Continued small bilateral pleural effusions with associated left basilar atelectasis. Underlying left basilar infiltrate cannot be completely excluded. Appears to been improvement of pulmonary vascular congestion with mild residual vascular prominence. Xr Chest Portable    Result Date: 1/29/2021  Interval enlargement of cardiac silhouette and interval development of interstitial opacities with bilateral effusions consistent with pulmonary edema. Xr Chest Portable    Result Date: 1/28/2021  No acute cardiopulmonary pathology. Us Retroperitoneal Complete    Result Date: 1/28/2021  Normal sonographic appearance of the kidneys.        Physical Examination:       General appearance:  alert, cooperative and mild distress 2/2 generalized discomfort  Mental Status:  oriented to person, place and time and anxious affect  Lungs:  clear to auscultation bilaterally, normal effort, on room air  Heart:  regular rate and rhythm, no murmur  Abdomen:  soft, nontender, nondistended, normal bowel sounds, no masses, hepatomegaly, splenomegaly  Extremities:  no edema, redness, tenderness in the calves  Skin:  no gross lesions, rashes, induration    Assessment:     Hospital Problems           Last Modified POA    * (Principal) Acute cystitis with hematuria 2/2/2021 Yes    Hyperlipidemia 1/28/2021 Yes    Cardiomegaly 1/28/2021 Yes    Dementia ([address] 1/28/2021 Yes    Gastroesophageal reflux disease 1/28/2021 Yes    Essential hypertension 1/28/2021 Yes    History of ventricular fibrillation 1/28/2021 Yes    History of recurrent UTIs (Chronic) 1/28/2021 Yes    Elevated brain natriuretic peptide (BNP) level 1/28/2021 Yes    Sepsis ([address] 1/28/2021 Yes    Hypomagnesemia 1/28/2021 Yes    Hypokalemia 1/28/2021 Yes    Dehydration 1/28/2021 Yes    MARCOS (acute kidney injury) ([facility] 2/2/2021 Yes    Elevated troponin 1/28/2021 Yes    Hypoxia 1/29/2021 Yes    Gram-negative bacteremia 1/29/2021 Yes    Sepsis due to Escherichia coli with acute renal failure and tubular necrosis without septic shock ([facility] [address] 1/29/2021 Yes          Plan:     1. Sepsis due to ESBL E. coli UTI. IV Invanz as ID recommends. Plan for patient to be discharged home with home care and continue daily IV Invanz. Follow cultures/ resp viral panel pending. No growth noted on repeat cx.  2. Acute CHF: Continue metoprolol. Monitor intake and output. Elevated BNP possibly due to MARCOS. No signs of fluid overload. IV fluids off at this time. 3. Hyperlipidemia: Continue statin  4. Dementia: Further monitoring and management as outpatient per PCP. Patient to be discharged home with home care  5. GERD: PPI  6. Hypertension: Continue metoprolol. Blood pressure well controlled. Monitor vital signs as ordered. 7. History of recurrent UTIs. D-mannose upon discharge. 8. Sepsis: ID following. Appreciate input. IV Invanz as ordered and upon discharge per ID recommendations. Tylenol for generalized body aches. Discussed x-ray results with infectious disease. No additional antibiotics recommended. 9. MARCOS: Resolved. 10. Hypokalemia: Resolved. 11. Hypoxia: Patient on room air. Continue to encourage pulmonary toilet, I-S, and Acapella. Encourage ambulation. Plan for discharge home today.     RIC Kiran - VAMSHI  2/3/2021  1:19 PM

## 2021-02-03 NOTE — PROGRESS NOTES
RT in to instruct patient on Acapella medical device . Policy and Procedure explained and questions answered . Pt was able to demonstrate maneuver .

## 2021-02-03 NOTE — PROGRESS NOTES
Pt receiving her IV antibiotics Invanz at this time. C/O BLE discomfort; one time dose of ibuprofen ordered and administered. Pt's  at bedside; pt will be discharged to home after IV infusion.

## 2021-02-03 NOTE — PROGRESS NOTES
Occupational Therapy  Facility/Department: [facility] MED SURG ICU  Daily Treatment Note  NAME: Hugh Wiseman  : 1940  MRN: [id number]    Date of Service: 2/3/2021    Discharge Recommendations:  Patient would benefit from continued therapy after discharge       Assessment   Performance deficits / Impairments: Decreased functional mobility ; Decreased safe awareness;Decreased balance;Decreased ADL status; Decreased endurance;Decreased high-level IADLs;Decreased strength  Prognosis: Good  OT Education: OT Role;Transfer Training;Equipment;Plan of Care;ADL Adaptive Strategies; Energy Conservation;Precautions(Activity Promotion)  REQUIRES OT FOLLOW UP: Yes  Activity Tolerance  Activity Tolerance: Patient limited by fatigue;Patient limited by pain  Safety Devices  Safety Devices in place: Yes  Type of devices: Gait belt;Nurse notified;Call light within reach; Left in chair;Chair alarm in place  Restraints  Initially in place: No         Patient Diagnosis(es): The primary encounter diagnosis was Acute UTI. A diagnosis of Septicemia ([facility] [address] was also pertinent to this visit. has a past medical history of Accelerated hypertension, Atypical chest pain, Hyperlipidemia, Hypertension, and Labile blood pressure. has a past surgical history that includes Hysterectomy; Abdomen surgery; LIGATION OF SAPHENOUS VEIN; and bladder suspension. Restrictions  Restrictions/Precautions  Restrictions/Precautions: Fall Risk, Contact Precautions  Required Braces or Orthoses?: No  Position Activity Restriction  Other position/activity restrictions: Ambulate patient. Up with assistance.      Subjective   General  Chart Reviewed: Yes, Labs, Progress Notes, History and Physical, Imaging  Patient assessed for rehabilitation services?: Yes  Response to previous treatment: Patient reporting fatigue but able to participate  Family / Caregiver Present: Yes()  General Comment  Comments: RN ok'd for therapy this PM; pt agreeable to participate in session and pleasant/cooperative throughout. Pain Assessment  Pain Assessment: 0-10  Pain Level: 5  Pain Type: Acute pain  Pain Location: Back  Pain Orientation: Lower;Mid  Pain Descriptors: Aching;Discomfort  Functional Pain Assessment: Prevents or interferes some active activities and ADLs  Non-Pharmaceutical Pain Intervention(s): Ambulation/Increased Activity; Distraction; Emotional support;Repositioned  Response to Pain Intervention: Patient Satisfied  Vital Signs  Patient Currently in Pain: Yes     Orientation  Orientation  Overall Orientation Status: Within Functional Limits     Objective    ADL  Grooming: Increased time to complete;Stand by assistance(standing sink side to perform hand hygiene, oral care, facial hygiene, and comb hair)  LE Dressing: Increased time to complete;Contact guard assistance(seated on toilet to don underpants, standing with use of grab bars to pull up underpants over bottom)  Toileting: Increased time to complete;Contact guard assistance(for LB clothing mngt, pericare/bottom hygiene, and toilet transfer; use of grab bars)  Additional Comments: Pt required increased time/effort to perform. Balance  Sitting Balance: Stand by assistance  Standing Balance: Contact guard assistance  Standing Balance  Time: ~10 minutes total (~3 minutes at first bout, ~7 minutes at second bout)  Activity: functional mobility to/from bathroom, toileting tasks, standing sink side to perform grooming tasks  Comment: 1x prolonged seated rest break due to fatigue and increased low back pain with activity    Functional Mobility  Functional - Mobility Device: Rolling Walker  Activity: To/from bathroom  Assist Level: Contact guard assistance  Functional Mobility Comments: Mildly unsteady however with no true LOB. Increased time/effort. Min VCs for RW mngt/navigation and safety awareness.     Toilet Transfers  Toilet - Technique: Ambulating(with use of RW)  Equipment Used: Standard toilet(with use of grab bars)  Toilet Transfer: Contact guard assistance        Transfers  Sit to stand: Contact guard assistance  Stand to sit: Contact guard assistance  Transfer Comments: Min VCs for proper hand placement/sequencing/safety awareness with use of RW during functional transfers. Increased time/effort.          Cognition  Overall Cognitive Status: [facility]         Plan   Plan  Times per week: 5-6x/wk  Times per day: Daily  Current Treatment Recommendations: Strengthening, Endurance Training, Patient/Caregiver Education & Training, Equipment Evaluation, Education, & procurement, Self-Care / ADL, Balance Training, Home Management Training, Functional Mobility Training, Safety Education & Training      Goals  Short term goals  Time Frame for Short term goals: 14 visits  Short term goal 1: Pt will independently demo good safety awareness throughout fucntional tasks  Short term goal 2: Pt will perform functional transfers/mobility modified independent with use of LRD  Short term goal 3: Pt will perform ADLs modified independent with use of AE / DME PRN  Short term goal 4: Pt will demo 15+ minutes activity tolerance for increased ADL participation       Therapy Time   Individual Concurrent Group Co-treatment   Time In 1202         Time Out 1216         Minutes 14         Timed Code Treatment Minutes: Dotty Boswell 1615       Bonifacio Clements, OTR/L

## 2021-02-04 ENCOUNTER — CARE COORDINATION (OUTPATIENT)
Dept: CASE MANAGEMENT | Age: 81
End: 2021-02-04

## 2021-02-04 NOTE — CARE COORDINATION
[place] [address] Transitions Initial Follow Up Call    Call within 2 business days of discharge: Yes    Patient: Kellie Anderson Patient : 1940   MRN: [id number]  Reason for Admission: UTI  Discharge Date: 2/3/21 RARS: Readmission Risk Score: 17    BPCI-A Medical Bundle Patient:  Working DR Sepsis  Admitting Location: [facility]  Adm Date: 21  Date of Discharge: 2/3/21     Bundle End Date: 21    Last Discharge [facility]       Complaint Diagnosis Description Type Department Provider    21 Fatigue; Cough; Arm Pain Acute UTI . .. ED to Hosp-Admission (Discharged) (ADMITTED) EVITA PORTER MS Lyla Castleman, MD; Joe Hayden. .. [place] [id number] [place] [address] [place]: [facility]    Attempted to contact Clarisa Dunne for initial [employer], but her  answered the phone. He stated that she was not doing very well. He stated that she said that she felt like she had the flu. He said that she came home last night, took a 1/2 pill for her racing heart and then went to bed. He said that she has been in bed since. He said that she did drink 1/2 a bottle of Ensure and is not drinking . He said that she is complaining of lower back pain, does not know if she is running a temp and is more confused that she is normally. He said that the visiting nurse is coming this afternoon for the IV antibiotic and he did schedule a PCP appointment for tomorrow. Explained that she may have to return to the hospital, but have the home care nurse assess his wife. Will call tomorrow to check up on Freda.     Non-face-to-face services provided:  Scheduled appointment with PCP-20    Care Transitions 24 Hour Call    Do you have any ongoing symptoms?: Yes  Patient-reported symptoms: Weakness, Fatigue  Do you have a copy of your discharge instructions?: Yes  Do you have all of your prescriptions and are they filled?: No  Have you been contacted by a [address] [address] Pharmacist?: No  Have you scheduled your follow up appointment?: Yes  How are you going to get to your appointment?: Car - family or friend to transport  Were you discharged with any Home Care or Post Acute Services: Yes  Post Acute Services: Home Health (Comment: Loretta)  Do you feel like you have everything you need to keep you well at home?: Yes  Care Transitions Interventions         Follow Up  No future appointments.     Carl De León RN

## 2021-02-04 NOTE — PROGRESS NOTES
Pt discharged to home at this time; discharge instructions, home medication list, prescriptions and TWILA provided to pt and pt's ; pt's  verbalized understanding of instructions. [facility] home care is set to visit pt daily for antibiotic infusion. Dressing to midline changed prior to discharge. Pt off unit per wheelchair in stable condition. Pt took all of her personal belongings with her. Pt's  is taking pt home.

## 2021-02-05 ENCOUNTER — CARE COORDINATION (OUTPATIENT)
Dept: CASE MANAGEMENT | Age: 81
End: 2021-02-05

## 2021-02-08 LAB
CULTURE: NORMAL
CULTURE: NORMAL
Lab: NORMAL
Lab: NORMAL
SPECIMEN DESCRIPTION: NORMAL
SPECIMEN DESCRIPTION: NORMAL

## 2021-02-12 ENCOUNTER — CARE COORDINATION (OUTPATIENT)
Dept: CASE MANAGEMENT | Age: 81
End: 2021-02-12

## 2021-02-12 NOTE — CARE COORDINATION
[facility] Transitions Follow Up Call    2021    Patient: Micheal Timmons  Patient : 1940   MRN: <[id number]>  Reason for Admission:   Discharge Date: 2/3/21 RARS: Readmission Risk Score: 17    Attempted to reach patient by phone regarding follow up; BPCI-A. Party answering the phone reports patient is not available. Will attempt outreach at a later time.          Adam Grimaldo RN

## 2021-02-19 ENCOUNTER — CARE COORDINATION (OUTPATIENT)
Dept: CASE MANAGEMENT | Age: 81
End: 2021-02-19

## 2021-02-19 NOTE — CARE COORDINATION
[facility] Transitions Follow Up Call    2021    Patient: Jessica Salmeron  Patient : 1940   MRN: <[id number]>  Reason for Admission:   Discharge Date: 2/3/21 RARS: Readmission Risk Score: 17         Spoke with: Patient, Jessica Salmeron    Patient's spouse is sharing on the call. Care Transitions Subsequent and Final Call    Subsequent and Final Calls  Do you have any ongoing symptoms?: No  Do you have any questions related to your medications?: No  Do you currently have any active services?: No  Do you have any needs or concerns that I can assist you with?: No  Identified Barriers: None  Care Transitions Interventions  No Identified Needs      Patient is pleasant in conversation and reports she is doing well.   -denies fever, chills, shortness of breath, or chest discomfort   -reports good appetite; keeping hydrated  -reports voiding clear/yellow urine  -reports regular bowel patterns   -ambulates independently; denies recent fall   -reports taking prescribed medications as ordered    Emotional support provided; will continue to follow. Follow Up  No future appointments.     Bravo Metcalf RN

## 2021-02-26 ENCOUNTER — CARE COORDINATION (OUTPATIENT)
Dept: CASE MANAGEMENT | Age: 81
End: 2021-02-26

## 2021-02-26 NOTE — CARE COORDINATION
[facility] Transitions Follow Up Call    2021    Patient: Rosy Hernandez  Patient : 1940   MRN: <[id number]>  Reason for Admission: Acute UTI  Discharge Date: 2/3/21 RARS: Readmission Risk Score: 17    Attempted to contact patient for BPCI-A call.  answered, disconnected after introduction. Follow Up  No future appointments.     Hung Jackson RN

## 2021-03-05 PROBLEM — R77.8 ELEVATED TROPONIN: Status: RESOLVED | Noted: 2021-01-28 | Resolved: 2021-03-05

## 2021-03-10 ENCOUNTER — CARE COORDINATION (OUTPATIENT)
Dept: CASE MANAGEMENT | Age: 81
End: 2021-03-10

## 2021-03-10 NOTE — CARE COORDINATION
[facility] Transitions Follow Up Call    3/10/2021    Patient: Samir Lehman  Patient : 1940   MRN: <[id number]>  Reason for Admission:   Discharge Date: 2/3/21 RARS: Readmission Risk Score: 17         Spoke with: Samir Lehman, patient    Follow Up:  Contacted patient for BPCI-A follow up. Patient very short with CTN. Stated you have the wrong person. CTN explained reason for follow up call and 90 day follow up. Patient declined any further follow up calls. She stated she is fine. Denies having any urinary issues. Thanked CTN for calling and call was disconnected. No future appointments.     Girish Hale RN

## 2021-06-21 ENCOUNTER — HOSPITAL ENCOUNTER (EMERGENCY)
Age: 81
Discharge: ANOTHER HOSPITAL | End: 2021-06-21
Attending: EMERGENCY MEDICINE
Payer: MEDICARE

## 2021-06-21 ENCOUNTER — APPOINTMENT (OUTPATIENT)
Dept: CT IMAGING | Age: 81
End: 2021-06-21
Payer: MEDICARE

## 2021-06-21 VITALS
HEIGHT: 62 IN | HEART RATE: 92 BPM | DIASTOLIC BLOOD PRESSURE: 72 MMHG | BODY MASS INDEX: 20.43 KG/M2 | WEIGHT: 111 LBS | SYSTOLIC BLOOD PRESSURE: 119 MMHG | TEMPERATURE: 97.7 F | OXYGEN SATURATION: 92 % | RESPIRATION RATE: 16 BRPM

## 2021-06-21 DIAGNOSIS — N39.0 URINARY TRACT INFECTION WITHOUT HEMATURIA, SITE UNSPECIFIED: ICD-10-CM

## 2021-06-21 DIAGNOSIS — E87.1 HYPONATREMIA: Primary | ICD-10-CM

## 2021-06-21 LAB
-: ABNORMAL
ABSOLUTE EOS #: 0 K/UL (ref 0–0.4)
ABSOLUTE IMMATURE GRANULOCYTE: ABNORMAL K/UL (ref 0–0.3)
ABSOLUTE LYMPH #: 0.8 K/UL (ref 1–4.8)
ABSOLUTE MONO #: 0.5 K/UL (ref 0.1–1.2)
ALBUMIN SERPL-MCNC: 3.9 G/DL (ref 3.5–5.2)
ALBUMIN/GLOBULIN RATIO: 1.3 (ref 1–2.5)
ALP BLD-CCNC: 74 U/L (ref 35–104)
ALT SERPL-CCNC: 7 U/L (ref 5–33)
AMORPHOUS: ABNORMAL
ANION GAP SERPL CALCULATED.3IONS-SCNC: 11 MMOL/L (ref 9–17)
AST SERPL-CCNC: 16 U/L
BACTERIA: ABNORMAL
BASOPHILS # BLD: 1 % (ref 0–2)
BASOPHILS ABSOLUTE: 0.1 K/UL (ref 0–0.2)
BILIRUB SERPL-MCNC: 0.35 MG/DL (ref 0.3–1.2)
BILIRUBIN URINE: NEGATIVE
BUN BLDV-MCNC: 27 MG/DL (ref 8–23)
BUN/CREAT BLD: ABNORMAL (ref 9–20)
CALCIUM SERPL-MCNC: 9.2 MG/DL (ref 8.6–10.4)
CASTS UA: ABNORMAL /LPF
CHLORIDE BLD-SCNC: 83 MMOL/L (ref 98–107)
CO2: 20 MMOL/L (ref 20–31)
COLOR: YELLOW
COMMENT UA: ABNORMAL
CREAT SERPL-MCNC: 1.31 MG/DL (ref 0.5–0.9)
CRYSTALS, UA: ABNORMAL /HPF
DIFFERENTIAL TYPE: ABNORMAL
EOSINOPHILS RELATIVE PERCENT: 1 % (ref 1–4)
EPITHELIAL CELLS UA: ABNORMAL /HPF (ref 0–5)
GFR AFRICAN AMERICAN: 47 ML/MIN
GFR NON-AFRICAN AMERICAN: 39 ML/MIN
GFR SERPL CREATININE-BSD FRML MDRD: ABNORMAL ML/MIN/{1.73_M2}
GFR SERPL CREATININE-BSD FRML MDRD: ABNORMAL ML/MIN/{1.73_M2}
GLUCOSE BLD-MCNC: 95 MG/DL (ref 70–99)
GLUCOSE URINE: ABNORMAL
HCT VFR BLD CALC: 29.2 % (ref 36–46)
HEMOGLOBIN: 9.5 G/DL (ref 12–16)
IMMATURE GRANULOCYTES: ABNORMAL %
KETONES, URINE: NEGATIVE
LEUKOCYTE ESTERASE, URINE: ABNORMAL
LIPASE: 33 U/L (ref 13–60)
LYMPHOCYTES # BLD: 16 % (ref 24–44)
MCH RBC QN AUTO: 30.2 PG (ref 26–34)
MCHC RBC AUTO-ENTMCNC: 32.6 G/DL (ref 31–37)
MCV RBC AUTO: 92.6 FL (ref 80–100)
MONOCYTES # BLD: 9 % (ref 2–11)
MUCUS: ABNORMAL
NITRITE, URINE: POSITIVE
NRBC AUTOMATED: ABNORMAL PER 100 WBC
OTHER OBSERVATIONS UA: ABNORMAL
PDW BLD-RTO: 14.4 % (ref 12.5–15.4)
PH UA: 6 (ref 5–8)
PLATELET # BLD: 202 K/UL (ref 140–450)
PLATELET ESTIMATE: ABNORMAL
PMV BLD AUTO: 8.7 FL (ref 6–12)
POTASSIUM SERPL-SCNC: 4.7 MMOL/L (ref 3.7–5.3)
PROTEIN UA: ABNORMAL
RBC # BLD: 3.15 M/UL (ref 4–5.2)
RBC # BLD: ABNORMAL 10*6/UL
RBC UA: ABNORMAL /HPF (ref 0–2)
RENAL EPITHELIAL, UA: ABNORMAL /HPF
SEG NEUTROPHILS: 73 % (ref 36–66)
SEGMENTED NEUTROPHILS ABSOLUTE COUNT: 3.8 K/UL (ref 1.8–7.7)
SODIUM BLD-SCNC: 114 MMOL/L (ref 135–144)
SPECIFIC GRAVITY UA: 1.02 (ref 1–1.03)
TOTAL PROTEIN: 6.8 G/DL (ref 6.4–8.3)
TRICHOMONAS: ABNORMAL
TURBIDITY: CLEAR
URINE HGB: ABNORMAL
UROBILINOGEN, URINE: NORMAL
WBC # BLD: 5.2 K/UL (ref 3.5–11)
WBC # BLD: ABNORMAL 10*3/UL
WBC UA: ABNORMAL /HPF (ref 0–5)
YEAST: ABNORMAL

## 2021-06-21 PROCEDURE — 74177 CT ABD & PELVIS W/CONTRAST: CPT

## 2021-06-21 PROCEDURE — 36415 COLL VENOUS BLD VENIPUNCTURE: CPT

## 2021-06-21 PROCEDURE — 81001 URINALYSIS AUTO W/SCOPE: CPT

## 2021-06-21 PROCEDURE — 85025 COMPLETE CBC W/AUTO DIFF WBC: CPT

## 2021-06-21 PROCEDURE — 6360000004 HC RX CONTRAST MEDICATION: Performed by: EMERGENCY MEDICINE

## 2021-06-21 PROCEDURE — 83690 ASSAY OF LIPASE: CPT

## 2021-06-21 PROCEDURE — 99284 EMERGENCY DEPT VISIT MOD MDM: CPT

## 2021-06-21 PROCEDURE — 80053 COMPREHEN METABOLIC PANEL: CPT

## 2021-06-21 PROCEDURE — 2580000003 HC RX 258: Performed by: EMERGENCY MEDICINE

## 2021-06-21 RX ORDER — SODIUM CHLORIDE 0.9 % (FLUSH) 0.9 %
10 SYRINGE (ML) INJECTION PRN
Status: DISCONTINUED | OUTPATIENT
Start: 2021-06-21 | End: 2021-06-21 | Stop reason: HOSPADM

## 2021-06-21 RX ORDER — 0.9 % SODIUM CHLORIDE 0.9 %
80 INTRAVENOUS SOLUTION INTRAVENOUS ONCE
Status: COMPLETED | OUTPATIENT
Start: 2021-06-21 | End: 2021-06-21

## 2021-06-21 RX ADMIN — IOPAMIDOL 75 ML: 755 INJECTION, SOLUTION INTRAVENOUS at 15:34

## 2021-06-21 RX ADMIN — SODIUM CHLORIDE, PRESERVATIVE FREE 10 ML: 5 INJECTION INTRAVENOUS at 15:34

## 2021-06-21 RX ADMIN — SODIUM CHLORIDE 80 ML: 9 INJECTION, SOLUTION INTRAVENOUS at 15:33

## 2021-06-21 ASSESSMENT — PAIN DESCRIPTION - LOCATION: LOCATION: PELVIS;ABDOMEN

## 2021-06-21 ASSESSMENT — PAIN SCALES - GENERAL: PAINLEVEL_OUTOF10: 7

## 2021-06-21 NOTE — ED PROVIDER NOTES
[id number] [facility] ED    [address] [address] [facility] [address] [facility] [address]  Phone: [phone]  Fax: [phone]  Emergency Department  Faculty Attestation    I performed a history and physical examination of the patient and discussed management with the mid level provideer. I reviewed the mid level provider's note and agree with the documented findings and plan of care. Any areas of disagreement are noted on the chart. I was personally present for the key portions of any procedures. I have documented in the chart those procedures where I was not present during the key portions. I have reviewed the emergency nurses triage note. I agree with the chief complaint, past medical history, past surgical history, allergies, medications, social and family history as documented unless otherwise noted below. Documentation of the HPI, Physical Exam and Medical Decision Making performed by medical students or scribes is based on my personal performance of the HPI, PE and MDM. For Physician Assistant/ Nurse Practitioner cases/documentation I have personally evaluated this patient and have completed at least one if not all key elements of the E/M (history, physical exam, and MDM). Additional findings are as noted.       Primary Care Physician:  Josué Dominguez MD    CHIEF COMPLAINT       Chief Complaint   Patient presents with    Dysuria    Abdominal Pain       RECENT VITALS:   Temp: 97.7 °F (36.5 °C),  Pulse: 92, Resp: 16, BP: 119/72    LABS:  Labs Reviewed   URINE RT REFLEX TO CULTURE - Abnormal; Notable for the following components:       Result Value    Glucose, Ur TRACE (*)     Urine Hgb TRACE (*)     Protein, UA 2+ (*)     Nitrite, Urine POSITIVE (*)     Leukocyte Esterase, Urine TRACE (*)     All other components within normal limits   CBC WITH AUTO DIFFERENTIAL - Abnormal; Notable for the following components:    RBC 3.15 (*)     Hemoglobin 9.5 (*)     Hematocrit 29.2 (*)     Seg Neutrophils 73 (*)     Lymphocytes 16 Will consult on pt at [facility]. Does not feel ICU admission is necessary. [address] contacted to attempt transfer. [phone]  Discussed with Dr. Don Borrero.   Admit to med-surg tele at [facility] Tye Ramesh MD  06/23/21 4880

## 2021-06-21 NOTE — ED NOTES
Received bed from [address] [place] [address] St: 204. Transport being set up at this time.       Leonard Duncan RN  06/21/21 8523

## 2021-06-21 NOTE — ED NOTES
[facility] page[facility]лия per [facility] via [facility] for admission. Jessie amos.      Crystal Marshall  06/21/21 8670

## 2021-06-21 NOTE — ED PROVIDER NOTES
use. She reports that she does not use drugs. Family History: None  Psychiatric History: None    Allergies:is allergic to morphine, doxycycline, azithromycin, and ciprofloxacin. PHYSICAL EXAM     INITIAL VITALS: /72   Pulse 92   Temp 97.7 °F (36.5 °C) (Oral)   Resp 16   Ht 5' 1.5\" (1.562 m)   Wt 50.3 kg (111 lb)   SpO2 92%   BMI 20.63 kg/m²   Constitutional:  Well developed, nontoxic. Eyes:  Pupils equal/round  HENT:  Atraumatic, external ears normal, nose normal  Respiratory:  Clear to auscultation bilaterally with good air exchange, no W/R/R  Cardiovascular:  RRR with normal S1 and S2  Abdomen:  Gestures to lower abd/suprapubic as area of symptoms but nontender with palpation, Soft. BS present. Musculoskeletal:  Normal to inspection  Back:  No midline pain. No CVA tenderness. Integument:  No rash. Neurologic:  Alert, poor memory, pleasant. Appears unable to comprehend explanation of her symptoms and lab findings. DIAGNOSTIC RESULTS     EKG: All EKG's are interpreted by the Emergency Department Physician who either signs or Co-signs this chart in the absence of a cardiologist.  Not indicated    RADIOLOGY:   Reviewed the radiologist:  CT ABDOMEN PELVIS W IV CONTRAST Additional Contrast? None   Final Result   Cholelithiasis. Sigmoid diverticulosis. Post-surgical changes of the rectosigmoid region. Otherwise, unremarkable contrast-enhanced CT examination of the abdomen and   pelvis, without finding to account for the patient's symptoms.                  LABS:  Labs Reviewed   URINE RT REFLEX TO CULTURE - Abnormal; Notable for the following components:       Result Value    Glucose, Ur TRACE (*)     Urine Hgb TRACE (*)     Protein, UA 2+ (*)     Nitrite, Urine POSITIVE (*)     Leukocyte Esterase, Urine TRACE (*)     All other components within normal limits   CBC WITH AUTO DIFFERENTIAL - Abnormal; Notable for the following components:    RBC 3.15 (*)     Hemoglobin 9.5 (*) Hematocrit 29.2 (*)     Seg Neutrophils 73 (*)     Lymphocytes 16 (*)     Absolute Lymph # 0.80 (*)     All other components within normal limits   COMPREHENSIVE METABOLIC PANEL - Abnormal; Notable for the following components:    BUN 27 (*)     CREATININE 1.31 (*)     Sodium 114 (*)     Chloride 83 (*)     GFR Non- 39 (*)     GFR  47 (*)     All other components within normal limits   MICROSCOPIC URINALYSIS - Abnormal; Notable for the following components:    Bacteria, UA FEW (*)     Mucus, UA 1+ (*)     Amorphous, UA 1+ (*)     Other Observations UA   (*)     Value: Utilizing a urinalysis as the only screening method to exclude a potential uropathogen can be unreliable in many patient populations. Rapid screening tests are less sensitive than culture and if UTI is a clinical possibility, culture should be considered despite a negative urinalysis. All other components within normal limits   LIPASE         EMERGENCY DEPARTMENT COURSE:         1339  Critically low Na level. Pt informed that she should be admitted for this. She was having trouble understanding this need but  was able to convince her and is making the decision to have her admitted for this. Unsure if this is her baseline mentation or due to her hyponatremia. Maryjo Carrera. 1556  We had multiple discusssions with Kishor and our on-call Nephrology. Attending spoke to Dr Sudarshan Rangel directly and Kishor too spoke to other Nephologists from this group. Consensus recommendation was to transfer to [facility] for possible ICU level of care. I explained this to  and both he/patient prefer [facility] if possible. Attending spoke to [facility] and they are agreeable to admit. Our CT here has found no acute issues. /pt informed of this and we await transport timeframe to [facility].        Orders Placed This Encounter   Medications    iopamidol (ISOVUE-370) 76 % injection 75 mL    0.9 % sodium chloride bolus    DISCONTD: sodium chloride flush 0.9 % injection 10 mL       CONSULTS:  None      FINAL IMPRESSION      1. Hyponatremia    2. Urinary tract infection without hematuria, site unspecified          DISPOSITION/PLAN:  DISPOSITION Decision To Admit 06/21/2021 01:34:03 PM        PATIENT REFERRED TO:  No follow-up provider specified.     DISCHARGE MEDICATIONS:  Discharge Medication List as of 6/21/2021  5:08 PM          (Please note that portions of this note were completed with a voice recognition program.  Efforts were made to edit the dictations but occasionally words are mis-transcribed.)    JOSSE Hilton PA-C  06/22/21 1121

## 2021-06-21 NOTE — PLAN OF CARE
I SPOKE WITH DR Zoey Wilburn ABOUT THE PATIENT STAYING AT [facility], HE BELIEVES THE PATIENT SHOULD BE ADMITTED TO [facility] ICU, HE ALSO SUGGESTED DISCUSSING THE CASE WITH NEPHROLOGY I THEN SPOKE WITH DR Michael Dodson WHO AGREED THE PATIENT SHOULD BE TRANSFERRED TO [facility] AS WELL

## 2022-08-08 ENCOUNTER — HOSPITAL ENCOUNTER (EMERGENCY)
Age: 82
Discharge: HOME | End: 2022-08-08
Attending: EMERGENCY MEDICINE
Payer: MEDICARE

## 2022-08-08 VITALS
HEART RATE: 90 BPM | BODY MASS INDEX: 22.09 KG/M2 | TEMPERATURE: 98.6 F | HEIGHT: 61 IN | SYSTOLIC BLOOD PRESSURE: 188 MMHG | RESPIRATION RATE: 16 BRPM | WEIGHT: 117 LBS | OXYGEN SATURATION: 97 % | DIASTOLIC BLOOD PRESSURE: 99 MMHG

## 2022-08-08 DIAGNOSIS — R21 RASH AND OTHER NONSPECIFIC SKIN ERUPTION: Primary | ICD-10-CM

## 2022-08-08 PROCEDURE — 99283 EMERGENCY DEPT VISIT LOW MDM: CPT

## 2022-08-08 PROCEDURE — 6370000000 HC RX 637 (ALT 250 FOR IP): Performed by: PHYSICIAN ASSISTANT

## 2022-08-08 RX ORDER — DIPHENHYDRAMINE HCL 25 MG
50 TABLET ORAL ONCE
Status: COMPLETED | OUTPATIENT
Start: 2022-08-08 | End: 2022-08-08

## 2022-08-08 RX ADMIN — DIPHENHYDRAMINE HYDROCHLORIDE 50 MG: 25 TABLET ORAL at 17:12

## 2022-08-08 ASSESSMENT — PAIN SCALES - GENERAL: PAINLEVEL_OUTOF10: 8

## 2022-08-08 ASSESSMENT — PAIN - FUNCTIONAL ASSESSMENT: PAIN_FUNCTIONAL_ASSESSMENT: 0-10

## 2022-08-08 NOTE — ED PROVIDER NOTES
[id number] [facility] ED  [address] [facility] [address]  Phone: [phone]  Fax: [phone]        Pt Name: Ashanti Melchor  MRN: [id number]  [place] 1940  Date of evaluation: 8/8/22    [address]       Chief Complaint   Patient presents with    Rash     arms       HISTORY OF PRESENT ILLNESS (Location/Symptom, Timing/Onset, Context/Setting, Quality, Duration, Modifying Factors, Severity)      Ashanti Melchor is a 79 y.o. female with pertinent PMH of dementia (per ) who presents to the ED via private auto with a rash. Patient has been seen 5 times since 7/13 for this rash on her bilateral upper extremities. She has been advised to use topical steroids, oral Benadryl and oral steroids. It has been present for the last month. It is significantly worse over the last 2 to 3 days and feels like a burn. She does admit to using IcyHot and calamine lotion as well as Neosporin on her arms today. She has only tried 1 dose of Benadryl in the week. She has a referral to dermatology. She did take the prednisone. She did not have any change in her symptoms. Denies any previous history of this type of rash. Denies any known allergies. Denies any new exposures to medications, foods, plants, animals, soaps, detergents or materials. Denies any exacerbating or alleviating factors. Denies any difficulty breathing, difficulty swallowing, chest pain, shortness of breath, fever, chills, nausea, vomiting, abdominal pain, or any other concerns at this time. PAST MEDICAL / SURGICAL / SOCIAL / FAMILY HISTORY     PMH:  has a past medical history of Accelerated hypertension, Atypical chest pain, Hyperlipidemia, Hypertension, and Labile blood pressure. Surgical History:  has a past surgical history that includes Hysterectomy; Abdomen surgery; LIGATION OF SAPHENOUS VEIN; and bladder suspension. Social History:  reports that she has quit smoking.  She has never used smokeless tobacco. She reports that she does not currently use alcohol. She reports that she does not use drugs. Family History: She indicated that her mother is . She indicated that her father is . She indicated that two of her three brothers are alive. family history includes Alzheimer's Disease (age of onset: 67) in her father; Heart Attack (age of onset: 62) in her brother; Heart Disease in her brother and brother; Heart Disease (age of onset: 78) in her mother. Psychiatric History: None    Allergies: Morphine, Doxycycline, Azithromycin, and Ciprofloxacin    Home Medications:   Prior to Admission medications    Medication Sig Start Date End Date Taking? Authorizing Provider   ferrous sulfate (FE TABS 325) 325 (65 Fe) MG EC tablet Take 1 tablet by mouth daily (with breakfast) 2/3/21   RIC Mejia NP   traZODone (DESYREL) 50 MG tablet Take 50 mg by mouth nightly as needed for Sleep    Historical Provider, MD   pantoprazole (PROTONIX) 40 MG tablet Take 1 tablet by mouth daily 20   Park Anders DO   aspirin 81 MG chewable tablet Take 81 mg by mouth daily    Historical Provider, MD   acebutolol (SECTRAL) 400 MG capsule Take 400 mg by mouth daily    Historical Provider, MD   lovastatin (MEVACOR) 20 MG tablet Take 30 mg by mouth daily     Historical Provider, MD   estradiol (ESTRACE) 0.5 MG tablet Take 0.25 mg by mouth daily    Historical Provider, MD   Omega-3 Fatty Acids (FISH OIL PO) Take by mouth    Historical Provider, MD   Cholecalciferol (VITAMIN D3) 50 MCG ( UT) CAPS Take by mouth    Historical Provider, MD   Misc Natural Products (GLUCOSAMINE-CHONDROITIN PLUS PO) Take by mouth    Historical Provider, MD       REVIEW OF SYSTEMS  (2-9 systems for level 4, 10 ormore for level 5)      Review of Systems    Constitutional: See HPI. Eyes: Denies vision changes. Musculoskeletal: Denies recent trauma. Skin: Positive for rash. Neurologic:  Denies new numbness or weakness.   Psychiatric: Denies sleep disturbances. All other systems negative except as marked in HPI and ROS. PHYSICAL EXAM  (up to 7 for level 4, 8 or more for level 5)      INITIAL VITALS:  height is 5' 1\" (1.549 m) and weight is 53.1 kg (117 lb). Her oral temperature is 98.6 °F (37 °C). Her blood pressure is 205/108 (abnormal) and her pulse is 90. Her respiration is 16 and oxygen saturation is 97%. Vital signs reviewed. Physical Exam    General:  Alert, cooperative, well-groomed, well-nourished, appears stated age, and is in no acute distress. HEENT: Normocephalic, atraumatic, and without obvious abnormality. Sclerae/conjunctivae clear without injection, pallor, or icterus. Corneas clear without opacities. EOM's intact. Ears and nose are all without obvious masses lesion or deformity. Lips and buccal mucosa are pink and moist without lesions. Airway patent. Handling secretions. Tongue and uvula midline. No trismus or malocclusion. Symmetric elevation of soft palate upon phonation. No hoarseness or muffled voice. Oropharynx is clear, without erythema. Tonsils are symmetrical, without enlargement or erythema, bilaterally. No exudates or drainage. Neck: Supple and symmetrical. Trachea midline. No LAD. No jugular venous distention. Lungs:   No accessory muscle use. Clear to auscultation bilaterally. No wheezes, rhonchi, or rales. No stridor. Heart:  Regular rate. Regular rhythm. No murmurs, rubs, or gallops. Abdomen:   Soft, nontender, nondistended without guarding or rebound. Skin: Erythematous, photosensitivity rash noted to the bilateral arms, mostly right forearm and left forearm with sparing of the palms, soles, and mucous membranes. Blanchable. No petechiae. No dermatographia. Skin warm and dry. Neurologic: GCS is 15 and no focal deficits are appreciated. Normal gait. Grossly normal motor and sensation. Speech clear. Psychiatric: Normal mood and affect. Normal behavior. Coherent thought process. DIFFERENTIAL DIAGNOSIS / MDM     Patient presents to the emergency department with a pruritic, painful, erythematous rash to the bilateral upper extremities. Sparing of the palms, soles, and mucous membranes. Airway is patent. Normal speech. I have low suspicion for airway compromise, SJS, TENS syndrome, or infectious cause for the rash at this time. Vital signs are unremarkable. We discussed that the patient's rash seems consistent with a photosensitivity rash. She was on Bactrim and Keflex it appears but unsure whether or not this contributed. She is done so many topical medications and I do suspect that the [address] over last couple days is likely causing the burn. We rinsed the patient's arms and I had a lengthy discussion regarding keeping the patient from putting more lotions or soaps or ointments on her arms as I think this is worsening the rash. With her dementia, she asked 20 times if she could put something on it or what she could put on it. I expressed that she should not place anything topical and if she does only hydrocortisone. She has a follow-up with dermatology soon. The patient and/or family and I have discussed the diagnosis and risks, and we agree with discharging home to follow-up with their PCP and/or pertinent providers. The patient appears stable for discharge and has been instructed to return immediately for new concerning symptoms or if the symptoms worsen in any way. We have discussed the symptoms associated with the patient's presentation which are most concerning like fever (new or persistent with antipyretic usage), changing or worsening rash, development or worsening of pain, difficulty breathing, lip or face swelling, vomiting, abdominal pain, chest pain, lethargy, syncope, etc. that necessitate immediate return.      The patient understands that at this time there is no evidence for a more malignant underlying process, but the patient also understands that early in the process of an illness or injury, an emergency department workup can be falsely reassuring. Routine discharge counseling was given, and the patient understands that worsening, changing or persistent symptoms should prompt an immediate call or follow up with their primary physician or return to the emergency department. The importance of appropriate follow up was also discussed. I have reviewed the disposition diagnosis with the patient and or their family/guardian. I have answered their questions and given discharge instructions. They voiced understanding of these instructions and did not have any further questions or complaints. This patient was seen by the attending physician and they agreed with the assessment and plan. PLAN (LABS / IMAGING / EKG):  No orders of the defined types were placed in this encounter. MEDICATIONS ORDERED:  Orders Placed This Encounter   Medications    diphenhydrAMINE (BENADRYL) tablet 50 mg       Controlled Substances Monitoring:     DIAGNOSTIC RESULTS     EKG: None    RADIOLOGY: All images are read by the radiologist and their interpretations are reviewed. No results found. LABS:  No results found for this visit on 08/08/22. EMERGENCY DEPARTMENT COURSE           Vitals:    Vitals:    08/08/22 1623   BP: (!) 205/108   Pulse: 90   Resp: 16   Temp: 98.6 °F (37 °C)   TempSrc: Oral   SpO2: 97%   Weight: 53.1 kg (117 lb)   Height: 5' 1\" (1.549 m)     -------------------------  BP: (!) 205/108, Temp: 98.6 °F (37 °C), Heart Rate: 90, Resp: 16      RE-EVALUATION:  No re-evaluation was necessary as patient was discharged home after first impression. The did request Benadryl here and will give some prior to d/c.    CONSULTS:  None    PROCEDURES:  None    FINAL IMPRESSION      1. Rash and other nonspecific skin eruption          DISPOSITION / PLAN     CONDITION ON DISPOSITION:   Good / Stable for discharge.      PATIENT REFERRED TO:  The Dermatologist    Call in 1 day  To

## 2022-08-08 NOTE — ED PROVIDER NOTES
[id number] [facility]  [address] [facility] [address]  Phone: [phone]  Fax: [phone]      Attending Physician Attestation    I performed a history and physical examination of the patient and discussed management with the mid level provider. I reviewed the mid level provider's note and agree with the documented findings and plan of care. Any areas of disagreement are noted on the chart. I was personally present for the key portions of any procedures. I have documented in the chart those procedures where I was not present during the key portions. I have reviewed the emergency nurses triage note. I agree with the chief complaint, past medical history, past surgical history, allergies, medications, social and family history as documented unless otherwise noted below. Documentation of the HPI, Physical Exam and Medical Decision Making performed by mid level providers is based on my personal performance of the HPI, PE and MDM. For Physician Assistant/ Nurse Practitioner cases/documentation I have personally evaluated this patient and have completed at least one if not all key elements of the E/M (history, physical exam, and MDM). Additional findings are as noted. CHIEF COMPLAINT       Chief Complaint   Patient presents with    Rash     arms         HISTORY OF PRESENT ILLNESS    Mavis Leonardo is a 79 y.o. female who presents with a rash to bilateral forearms for past 2 months, has applied topical agents without relief. Has seen PCP and had referral to dermatology but has not seen dermatology. PAST MEDICAL HISTORY    has a past medical history of Accelerated hypertension, Atypical chest pain, Hyperlipidemia, Hypertension, and Labile blood pressure. SURGICAL HISTORY      has a past surgical history that includes Hysterectomy; Abdomen surgery; LIGATION OF SAPHENOUS VEIN; and bladder suspension.     CURRENT MEDICATIONS       Previous Medications    ACEBUTOLOL (SECTRAL) 400 MG CAPSULE    Take 400 mg by mouth daily    ASPIRIN 81 MG CHEWABLE TABLET    Take 81 mg by mouth daily    CHOLECALCIFEROL (VITAMIN D3) 50 MCG (2000 UT) CAPS    Take by mouth    ESTRADIOL (ESTRACE) 0.5 MG TABLET    Take 0.25 mg by mouth daily    FERROUS SULFATE (FE TABS 325) 325 (65 FE) MG EC TABLET    Take 1 tablet by mouth daily (with breakfast)    LOVASTATIN (MEVACOR) 20 MG TABLET    Take 30 mg by mouth daily     MISC NATURAL PRODUCTS (GLUCOSAMINE-CHONDROITIN PLUS PO)    Take by mouth    OMEGA-3 FATTY ACIDS (FISH OIL PO)    Take by mouth    PANTOPRAZOLE (PROTONIX) 40 MG TABLET    Take 1 tablet by mouth daily    TRAZODONE (DESYREL) 50 MG TABLET    Take 50 mg by mouth nightly as needed for Sleep       ALLERGIES     is allergic to morphine, doxycycline, azithromycin, and ciprofloxacin. FAMILY HISTORY     She indicated that her mother is . She indicated that her father is . She indicated that two of her three brothers are alive. family history includes Alzheimer's Disease (age of onset: 67) in her father; Heart Attack (age of onset: 62) in her brother; Heart Disease in her brother and brother; Heart Disease (age of onset: 78) in her mother. SOCIAL HISTORY      reports that she has quit smoking. She has never used smokeless tobacco. She reports that she does not currently use alcohol. She reports that she does not use drugs. PHYSICAL EXAM     INITIAL VITALS:  height is 5' 1\" (1.549 m) and weight is 53.1 kg (117 lb). Her oral temperature is 98.6 °F (37 °C). Her blood pressure is 205/108 (abnormal) and her pulse is 90. Her respiration is 16 and oxygen saturation is 97%.       Patient is noted to have an erythematous rash limited to the upper extremities in the sun exposed area with some hyperpigmentation most consistent with what appears to be a hypersensitivity reaction I did not appreciate this is a cellulitis there is no rash on the trunk or the lower extremities the patient does admit to applying Bengay as well as antibiotic ointment hydrocortisone and other ointments to her arms which I feel is contributing to the photosensitivity reaction has good pulses motor sensation of both upper extremities      DIAGNOSTIC RESULTS         LABS:  No results found for this visit on 08/08/22. EMERGENCY DEPARTMENT COURSE:   Vitals:    Vitals:    08/08/22 1623   BP: (!) 205/108   Pulse: 90   Resp: 16   Temp: 98.6 °F (37 °C)   TempSrc: Oral   SpO2: 97%   Weight: 53.1 kg (117 lb)   Height: 5' 1\" (1.549 m)     -------------------------  BP: (!) 205/108, Temp: 98.6 °F (37 °C), Heart Rate: 90, Resp: 16      PERTINENT ATTENDING PHYSICIAN COMMENTS:    At this time the patient is without objective evidence of an acute process requiring hospitalization or inpatient management. They have remained hemodynamically stable throughout their entire ED visit and are stable for discharge with outpatient follow-up. The patient understands that at this time there is no evidence for a more malignant underlying process, but the patient also understands that early in the process of an illness or injury, an emergency department workup can be falsely reassuring. Routine discharge counseling was given, and the patient understands that worsening, changing or persistent symptoms should prompt an immediate call or follow up with their primary physician or return to the emergency department. The importance of appropriate follow up was also discussed. I have reviewed the disposition diagnosis with the patient and or their family/guardian. I have answered their questions and given discharge instructions. They voiced understanding of these instructions and did not have any further questions or complaints.      The patient has an appointment scheduled dermatology for Wednesday of this week I am recommending that she quit applying lotions to her upper extremities as I feel that is exacerbating her rash return to the ER for worsening of symptoms fever or signs of infection or other concerns otherwise to follow-up with dermatology on Wednesday as previously directed      (Please note that portions of this note were completed with a voice recognition program.  Efforts were made to edit the dictations but occasionally words are mis-transcribed.)    Esha Singh MD,, MD, F.A.C.E.P.   Attending Emergency Medicine Physician       Esha Singh MD  08/08/22 9359

## 2022-08-08 NOTE — DISCHARGE INSTRUCTIONS
Do not use ANY topical ointments or creams. You can use cold compresses/washcloths to help with pain/itching. You can take Benadryl, Zyrtec, OR Hydroxyzine orally to help with itching. Taking them all together may cause drowsiness. Tylenol/Ibuprofen for pain as needed. Contact the dermatologist tomorrow for follow-up. PLEASE RETURN TO THE EMERGENCY DEPARTMENT IMMEDIATELY if your symptoms worsen in anyway or in 1-2 days if not improved for re-evaluation. You should immediately return to the ER for symptoms such as new or worsening pain, difficulty breathing or swallowing, a change in your voice, a feeling of passing out, light headed, dizziness, chest pain, headache, abdominal pain or vomiting. Tatianna Koehler!!!    From [address] and [facility]    On behalf of the Emergency Department staff at [address], I would like to thank you for giving us the opportunity to address your health care needs and concerns. We hope that during your visit, our service was delivered in a professional and caring manner. Please keep [address] in mind as we walk with you down the path to your own personal wellness. Please expect an automated text message or email from us so we can ask a few questions about your health and progress. Based on your answers, a clinician may call you back to offer help and instructions. Please understand that early in the process of an illness or injury, an emergency department workup can be falsely reassuring. If you notice any worsening, changing or persistent symptoms please call your family doctor or return to the ER immediately. Tell us how we did during your visit at [email or web address]   and let us know about your experience

## 2022-08-17 ENCOUNTER — APPOINTMENT (OUTPATIENT)
Dept: GENERAL RADIOLOGY | Age: 82
End: 2022-08-17
Payer: MEDICARE

## 2022-08-17 ENCOUNTER — HOSPITAL ENCOUNTER (EMERGENCY)
Age: 82
Discharge: HOME | End: 2022-08-17
Attending: EMERGENCY MEDICINE
Payer: MEDICARE

## 2022-08-17 VITALS
WEIGHT: 130 LBS | DIASTOLIC BLOOD PRESSURE: 72 MMHG | BODY MASS INDEX: 24.55 KG/M2 | HEIGHT: 61 IN | TEMPERATURE: 97.9 F | SYSTOLIC BLOOD PRESSURE: 150 MMHG | RESPIRATION RATE: 18 BRPM | HEART RATE: 107 BPM | OXYGEN SATURATION: 96 %

## 2022-08-17 DIAGNOSIS — S61.412A SKIN TEAR OF LEFT HAND WITHOUT COMPLICATION, INITIAL ENCOUNTER: Primary | ICD-10-CM

## 2022-08-17 DIAGNOSIS — S50.10XA CONTUSION OF FOREARM, UNSPECIFIED LATERALITY, INITIAL ENCOUNTER: ICD-10-CM

## 2022-08-17 PROCEDURE — 73080 X-RAY EXAM OF ELBOW: CPT

## 2022-08-17 PROCEDURE — 6370000000 HC RX 637 (ALT 250 FOR IP): Performed by: EMERGENCY MEDICINE

## 2022-08-17 PROCEDURE — 73110 X-RAY EXAM OF WRIST: CPT

## 2022-08-17 PROCEDURE — 99283 EMERGENCY DEPT VISIT LOW MDM: CPT

## 2022-08-17 RX ORDER — ACETAMINOPHEN 325 MG/1
650 TABLET ORAL ONCE
Status: COMPLETED | OUTPATIENT
Start: 2022-08-17 | End: 2022-08-17

## 2022-08-17 RX ADMIN — ACETAMINOPHEN 650 MG: 325 TABLET ORAL at 15:54

## 2022-08-17 ASSESSMENT — PAIN DESCRIPTION - PAIN TYPE: TYPE: ACUTE PAIN

## 2022-08-17 ASSESSMENT — PAIN DESCRIPTION - FREQUENCY: FREQUENCY: CONTINUOUS

## 2022-08-17 ASSESSMENT — PAIN DESCRIPTION - ORIENTATION: ORIENTATION: LEFT

## 2022-08-17 ASSESSMENT — PAIN SCALES - GENERAL
PAINLEVEL_OUTOF10: 4
PAINLEVEL_OUTOF10: 5

## 2022-08-17 ASSESSMENT — PAIN - FUNCTIONAL ASSESSMENT: PAIN_FUNCTIONAL_ASSESSMENT: 0-10

## 2022-08-17 ASSESSMENT — PAIN DESCRIPTION - DESCRIPTORS: DESCRIPTORS: ACHING;DULL

## 2022-08-17 ASSESSMENT — PAIN DESCRIPTION - LOCATION: LOCATION: ARM

## 2022-08-17 ASSESSMENT — PAIN DESCRIPTION - ONSET: ONSET: PROGRESSIVE

## 2022-08-17 NOTE — ED NOTES
Patient to the ER with c/c of left arm pain from a fall at 10am today. Patient relates she was coming down some stairs and tripped and fell down last three steps. Denies any LOC with event. No neck or back pain. Noted abrasion to parietal of head. Redness to bilateral forearms for which spouse relates has been ongoing for months. She was seen by a dermatologist on Monday this week and put on medicine for same. Patient has pain to Left wrist/forearm and hand area. Patient also has a large skin tear to left hand as well. Patient is a/o x 3, patent airway, speaking clearly in complete sentences. Patient denies any CP or dyspnea. Lungs are clear and equal bilaterally to auscultation, HT are S/R, A=R. Abdomen is soft, non-tender. No NVD in last 24 hours. Normal Bowel and Bladder habits. Patient has strong PMS x 4. No peripheral edema is appreciated. Patient was ambulatory into the ER today without distress.       Jl Borden RN  08/17/22 9064

## 2022-08-17 NOTE — DISCHARGE INSTRUCTIONS
Wound clean and dry. Elevate as much as possible. Return for worsening swelling, drainage, bleeding, or if worse in any way. Please understand that at this time there is no evidence for a more serious underlying process, but that early in the process of an illness or injury, an emergency department workup can be falsely reassuring. You should contact your family doctor within the next 48 hours for a follow up appointment    Rustymundoobi Koehler!!!    From [facility] ([facility]) and [facility]    On behalf of the Emergency Department staff at [facility]), I would like to thank you for giving us the opportunity to address your health care needs and concerns. We hope that during your visit, our service was delivered in a professional and caring manner. Please keep [facility] ([facility]) in mind as we walk with you down the path to your own personal wellness. Please expect an automated text message or email from us so we can ask a few questions about your health and progress. Based on your answers, a clinician may call you back to offer help and instructions. Please understand that early in the process of an illness or injury, an emergency department workup can be falsely reassuring. If you notice any worsening, changing or persistent symptoms please call your family doctor or return to the ER immediately. Tell us how we did during your visit at [email or web address]   and let us know about your experience

## 2022-08-17 NOTE — ED NOTES
Cleansed area with soap/water. Steri strips applied with benzoin ointment. Non adaptic and cling.      Andrés Schaffer RN  08/17/22 5781

## 2022-08-17 NOTE — ED PROVIDER NOTES
[address]      Pt Name: Karen Villar  MRN: [id number]  [place] 1940  Date of evaluation: 8/17/2022      CHIEF COMPLAINT       Chief Complaint   Patient presents with    Fall     Patient fell down three steps at home this morning about 10am. She denies LOC. \"Lost her footing. \"         HISTORY OF PRESENT ILLNESS      Patient presents for a fall. She says she fell down 3 steps this morning about 10 AM.  She fell onto her outstretched hands. She is having some pain in her upper extremities. She did not strike her head or lose consciousness. She simply suffered a mechanical fall. The patient reports a skin tear on her left hand and the dorsal aspect. She also complains of some pain in her elbows and wrist.  She denies chest pain or shortness of breath. She denies head or neck discomfort. REVIEW OF SYSTEMS       All systems reviewed and negative unless noted in HPI. The patient denies fever or constitutional symptoms. Denies vision change. Denies any sore throat or rhinorrhea. Denies any neck pain or stiffness. Denies chest pain or shortness of breath. No nausea,  vomiting or diarrhea. Denies any dysuria. Denies urinary frequency or hematuria. In wrists and elbows after fall as noted in HPI. Denies any weakness, numbness or focal neurologic deficit. Thin skin that usually bruises. Skin tear on left hand. No recent psychiatric issues. Bruises easily. Denies any polyuria, polydypsia or history of immunocompromise. PAST MEDICAL HISTORY    has a past medical history of Accelerated hypertension, Atypical chest pain, Hyperlipidemia, Hypertension, and Labile blood pressure. SURGICAL HISTORY      has a past surgical history that includes Hysterectomy; Abdomen surgery; LIGATION OF SAPHENOUS VEIN; and bladder suspension.     CURRENT MEDICATIONS       Previous Medications    CHOLECALCIFEROL (VITAMIN D3) 50 MCG (2000 UT) CAPS    Take by mouth    FERROUS SULFATE (FE TABS 325) 325 (65 FE) MG EC TABLET    Take 1 tablet by mouth daily (with breakfast)    MISC NATURAL PRODUCTS (GLUCOSAMINE-CHONDROITIN PLUS PO)    Take by mouth    OMEGA-3 FATTY ACIDS (FISH OIL PO)    Take by mouth    PANTOPRAZOLE (PROTONIX) 40 MG TABLET    Take 1 tablet by mouth daily       ALLERGIES     is allergic to morphine, doxycycline, azithromycin, and ciprofloxacin. FAMILY HISTORY     She indicated that her mother is . She indicated that her father is . She indicated that two of her three brothers are alive. family history includes Alzheimer's Disease (age of onset: 67) in her father; Heart Attack (age of onset: 62) in her brother; Heart Disease in her brother and brother; Heart Disease (age of onset: 78) in her mother. SOCIAL HISTORY      reports that she has quit smoking. She has never used smokeless tobacco. She reports that she does not currently use alcohol. She reports that she does not use drugs. PHYSICAL EXAM     INITIAL VITALS:  height is 5' 1\" (1.549 m) and weight is 59 kg (130 lb). Her oral temperature is 97.9 °F (36.6 °C). Her blood pressure is 150/72 (abnormal) and her pulse is 107 (abnormal). Her respiration is 18 and oxygen saturation is 96%. The patient is alert and oriented, in no apparent distress. HEENT is atraumatic. Pupils are PERRL at 4 mm with normal extraocular motion. Mucous membranes moist.    Neck is supple with no pain or step-off. Heart sounds regular rate and rhythm with no gallops, murmurs, or rubs. Lungs clear, no wheezes, rales or rhonchi. Abdomen: soft, nontender with no pain to palpation. Musculoskeletal exam shows no evidence of trauma. Normal distal pulses in all extremities. Skin: 3 x 2 cm stellate skin tear on patient's left dorsal hand. Underlying hematoma noted. Hematoma and superficial bruising noted on the patient's forearms bilaterally.   Patient has normal range of motion of elbows and wrist bilaterally. No pain in the shoulders. No lower extremity pain. No pain in the neck or back. Normal distal pulses in all extremities. Neurological exam reveals cranial nerves 2 through 12 grossly intact. Patient has equal  and normal deep tendon reflexes. Psychiatric: no hallucinations or suicidal ideation. Lymphatics.:  No lymphadenopathy. DIFFERENTIAL DIAGNOSIS/ MDM:     Skin tear, contusion, fracture    DIAGNOSTIC RESULTS         RADIOLOGY:   I reviewed the radiologist interpretations:  XR ELBOW LEFT (MIN 3 VIEWS)   Final Result   Left elbow: No acute abnormality detected. Left wrist: No acute abnormality detected. XR WRIST LEFT (MIN 3 VIEWS)   Final Result   Left elbow: No acute abnormality detected. Left wrist: No acute abnormality detected. XR WRIST RIGHT (MIN 3 VIEWS)   Preliminary Result   No acute osseous abnormality in the right wrist or right elbow. XR ELBOW RIGHT (MIN 3 VIEWS)   Preliminary Result   No acute osseous abnormality in the right wrist or right elbow. XR ELBOW LEFT (MIN 3 VIEWS) (Final result)  Result time 08/17/22 16:16:34  Final result by Arley López MD (08/17/22 16:16:34)                Impression:    Left elbow: No acute abnormality detected. Left wrist: No acute abnormality detected. Narrative:    EXAMINATION:   THREE XRAY VIEWS OF THE LEFT ELBOW; 4 XRAY VIEWS OF THE LEFT WRIST     8/17/2022 12:34 pm     COMPARISON:   None. HISTORY:   ORDERING SYSTEM PROVIDED HISTORY: fall   TECHNOLOGIST PROVIDED HISTORY:   fall   Reason for Exam: Pt had a fall up the stairs today, presented with skin   abrasions. Pt states she used her elbow and wrists to catch her fall. Pain in   left and right elbow and left and right wrists. FINDINGS:   Left elbow: The radiocapitellar and ulnotrochlear joints are congruent. No   acute fracture or dislocation is identified.   Lateral examination is limited   by oblique projection. No obvious effusion. Periarticular soft tissues are   unremarkable. Left wrist: Scapholunate and lunotriquetral interosseous distances are within   normal limits. No fractures are found. There is normal alignment of the   carpal rows on the lateral examination. No soft tissue abnormalities are   identified. Chondrocalcinosis is seen. XR WRIST LEFT (MIN 3 VIEWS) (Final result)  Result time 08/17/22 16:16:34  Final result by Neo Melvin MD (08/17/22 16:16:34)                Impression:    Left elbow: No acute abnormality detected. Left wrist: No acute abnormality detected. Narrative:    EXAMINATION:   THREE XRAY VIEWS OF THE LEFT ELBOW; 4 XRAY VIEWS OF THE LEFT WRIST     8/17/2022 12:34 pm     COMPARISON:   None. HISTORY:   ORDERING SYSTEM PROVIDED HISTORY: fall   TECHNOLOGIST PROVIDED HISTORY:   fall   Reason for Exam: Pt had a fall up the stairs today, presented with skin   abrasions. Pt states she used her elbow and wrists to catch her fall. Pain in   left and right elbow and left and right wrists. FINDINGS:   Left elbow: The radiocapitellar and ulnotrochlear joints are congruent. No   acute fracture or dislocation is identified. Lateral examination is limited   by oblique projection. No obvious effusion. Periarticular soft tissues are   unremarkable. Left wrist: Scapholunate and lunotriquetral interosseous distances are within   normal limits. No fractures are found. There is normal alignment of the   carpal rows on the lateral examination. No soft tissue abnormalities are   identified. Chondrocalcinosis is seen. XR WRIST RIGHT (MIN 3 VIEWS) (Preliminary result)  Result time 08/17/22 16:16:36  Preliminary result by Hollie Keith DO (08/17/22 16:16:36)                Impression:    No acute osseous abnormality in the right wrist or right elbow.              Narrative: EXAMINATION:   4 XRAY VIEWS OF THE RIGHT WRIST; THREE XRAY VIEWS OF THE RIGHT ELBOW     8/17/2022 3:34 pm     COMPARISON:   None. HISTORY:   ORDERING SYSTEM PROVIDED HISTORY: Fall. TECHNOLOGIST PROVIDED HISTORY: Fall. Reason for Exam: Pt had a fall up the stairs today, presented with skin   abrasions. Pt states she used her elbow and wrists to catch her fall. Pain in   left and right elbow and left and right wrists. FINDINGS:   Right wrist: Soft tissues are within normal limits. There is no evidence of   acute fracture or dislocation. There is mild joint space narrowing at the   triscaphe and 1st carpometacarpal joints. No bony erosion. Mild   chondrocalcinosis at the triangular fibrocartilage. Right elbow: No evidence of a joint effusion. No acute fracture or   dislocation. Joint spaces are preserved. Preliminary result by Juarez Chamberlain (08/17/22 16:13:26)                Impression:    No acute osseous abnormality in the right wrist or right elbow. XR ELBOW RIGHT (MIN 3 VIEWS) (Preliminary result)  Result time 08/17/22 16:16:36  Preliminary result by Arturo Barcenas DO (08/17/22 16:16:36)                Impression:    No acute osseous abnormality in the right wrist or right elbow. Narrative:    EXAMINATION:   4 XRAY VIEWS OF THE RIGHT WRIST; THREE XRAY VIEWS OF THE RIGHT ELBOW     8/17/2022 3:34 pm     COMPARISON:   None. HISTORY:   ORDERING SYSTEM PROVIDED HISTORY: Fall. TECHNOLOGIST PROVIDED HISTORY: Fall. Reason for Exam: Pt had a fall up the stairs today, presented with skin   abrasions. Pt states she used her elbow and wrists to catch her fall. Pain in   left and right elbow and left and right wrists. FINDINGS:   Right wrist: Soft tissues are within normal limits. There is no evidence of   acute fracture or dislocation. There is mild joint space narrowing at the   triscaphe and 1st carpometacarpal joints. No bony erosion. Mild   chondrocalcinosis at the triangular fibrocartilage. Right elbow: No evidence of a joint effusion. No acute fracture or   dislocation. Joint spaces are preserved. Preliminary result by Bibiana Hernández (08/17/22 16:13:26)                Impression:    No acute osseous abnormality in the right wrist or right elbow. EMERGENCY DEPARTMENT COURSE:   Vitals:    Vitals:    08/17/22 1515   BP: (!) 150/72   Pulse: (!) 107   Resp: 18   Temp: 97.9 °F (36.6 °C)   TempSrc: Oral   SpO2: 96%   Weight: 59 kg (130 lb)   Height: 5' 1\" (1.549 m)     -------------------------  BP: (!) 150/72, Temp: 97.9 °F (36.6 °C), Heart Rate: (!) 107, Resp: 18      Re-evaluation Notes    The patient should elevate her limbs. Wound care instructions were provided for the skin tear. There is no evidence of fracture. She is discharged in good condition. PROCEDURES:    Skin tear was by the nurse. Please refer to her documentation. FINAL IMPRESSION      1. Skin tear of left hand without complication, initial encounter    2.  Contusion of forearm, unspecified laterality, initial encounter          DISPOSITION/PLAN   DISPOSITION Decision To Discharge 08/17/2022 04:34:01 PM      Condition on Disposition    good    PATIENT REFERRED TO:  Rosemary Toussaint MD  [address]  [phone]    In 1 week      DISCHARGE MEDICATIONS:  New Prescriptions    No medications on file       (Please note that portions of this note were completed with a voice recognition program.  Efforts were made to edit the dictations but occasionally words are mis-transcribed.)    Amanda Anthony MD,, MD   Attending Emergency Physician          Trinh Chen MD  08/17/22 [id number]

## 2022-08-28 ENCOUNTER — APPOINTMENT (OUTPATIENT)
Dept: CT IMAGING | Age: 82
DRG: 439 | End: 2022-08-28
Payer: MEDICARE

## 2022-08-28 ENCOUNTER — HOSPITAL ENCOUNTER (INPATIENT)
Age: 82
LOS: 5 days | Discharge: HOSPICE | DRG: 439 | End: 2022-09-02
Attending: EMERGENCY MEDICINE | Admitting: HOSPITALIST
Payer: MEDICARE

## 2022-08-28 ENCOUNTER — APPOINTMENT (OUTPATIENT)
Dept: GENERAL RADIOLOGY | Age: 82
DRG: 439 | End: 2022-08-28
Payer: MEDICARE

## 2022-08-28 DIAGNOSIS — K85.90 ACUTE PANCREATITIS, UNSPECIFIED COMPLICATION STATUS, UNSPECIFIED PANCREATITIS TYPE: Primary | ICD-10-CM

## 2022-08-28 LAB
ABSOLUTE EOS #: 0 K/UL (ref 0–0.4)
ABSOLUTE LYMPH #: 0 K/UL (ref 1–4.8)
ABSOLUTE MONO #: 0.81 K/UL (ref 0.1–0.8)
ALBUMIN SERPL-MCNC: 3.4 G/DL (ref 3.5–5.2)
ALBUMIN/GLOBULIN RATIO: 0.9 (ref 1–2.5)
ALP BLD-CCNC: 118 U/L (ref 35–104)
ALT SERPL-CCNC: 33 U/L (ref 5–33)
ANION GAP SERPL CALCULATED.3IONS-SCNC: 20 MMOL/L (ref 9–17)
AST SERPL-CCNC: 42 U/L
BASOPHILS # BLD: 0 % (ref 0–2)
BASOPHILS ABSOLUTE: 0 K/UL (ref 0–0.2)
BILIRUB SERPL-MCNC: 0.38 MG/DL (ref 0.3–1.2)
BILIRUBIN URINE: NEGATIVE
BUN BLDV-MCNC: 24 MG/DL (ref 8–23)
CALCIUM SERPL-MCNC: 10.3 MG/DL (ref 8.6–10.4)
CHLORIDE BLD-SCNC: 84 MMOL/L (ref 98–107)
CO2: 17 MMOL/L (ref 20–31)
COLOR: YELLOW
COMMENT UA: NORMAL
CREAT SERPL-MCNC: 0.93 MG/DL (ref 0.5–0.9)
EOSINOPHILS RELATIVE PERCENT: 0 % (ref 1–4)
GFR AFRICAN AMERICAN: >60 ML/MIN
GFR NON-AFRICAN AMERICAN: 58 ML/MIN
GFR SERPL CREATININE-BSD FRML MDRD: ABNORMAL ML/MIN/{1.73_M2}
GLUCOSE BLD-MCNC: 159 MG/DL (ref 70–99)
GLUCOSE URINE: NEGATIVE
HCT VFR BLD CALC: 46.9 % (ref 36–46)
HEMOGLOBIN: 15.8 G/DL (ref 12–16)
KETONES, URINE: NEGATIVE
LACTIC ACID: 5.4 MMOL/L (ref 0.5–2.2)
LACTIC ACID: 5.5 MMOL/L (ref 0.5–2.2)
LEUKOCYTE ESTERASE, URINE: NEGATIVE
LIPASE: 1349 U/L (ref 13–60)
LYMPHOCYTES # BLD: 0 % (ref 24–44)
MAGNESIUM: 1.4 MG/DL (ref 1.6–2.6)
MCH RBC QN AUTO: 30.9 PG (ref 26–34)
MCHC RBC AUTO-ENTMCNC: 33.6 G/DL (ref 31–37)
MCV RBC AUTO: 92 FL (ref 80–100)
METAMYELOCYTES ABSOLUTE COUNT: 0.12 K/UL
METAMYELOCYTES: 1 %
MONOCYTES # BLD: 7 % (ref 1–7)
MORPHOLOGY: ABNORMAL
NITRITE, URINE: NEGATIVE
PDW BLD-RTO: 15 % (ref 12.5–15.4)
PH UA: 6.5 (ref 5–8)
PLATELET # BLD: 278 K/UL (ref 140–450)
PMV BLD AUTO: 9.1 FL (ref 6–12)
POTASSIUM SERPL-SCNC: 3.3 MMOL/L (ref 3.7–5.3)
PRO-BNP: 1774 PG/ML
PROTEIN UA: NEGATIVE
RBC # BLD: 5.1 M/UL (ref 4–5.2)
SEG NEUTROPHILS: 92 % (ref 36–66)
SEGMENTED NEUTROPHILS ABSOLUTE COUNT: 10.67 K/UL (ref 1.8–7.7)
SODIUM BLD-SCNC: 121 MMOL/L (ref 135–144)
SPECIFIC GRAVITY UA: 1.01 (ref 1–1.03)
TOTAL PROTEIN: 7.1 G/DL (ref 6.4–8.3)
TROPONIN, HIGH SENSITIVITY: 26 NG/L (ref 0–14)
TURBIDITY: CLEAR
URINE HGB: NEGATIVE
UROBILINOGEN, URINE: NORMAL
WBC # BLD: 11.6 K/UL (ref 3.5–11)

## 2022-08-28 PROCEDURE — 80053 COMPREHEN METABOLIC PANEL: CPT

## 2022-08-28 PROCEDURE — 74174 CTA ABD&PLVS W/CONTRAST: CPT

## 2022-08-28 PROCEDURE — 83735 ASSAY OF MAGNESIUM: CPT

## 2022-08-28 PROCEDURE — 2580000003 HC RX 258: Performed by: STUDENT IN AN ORGANIZED HEALTH CARE EDUCATION/TRAINING PROGRAM

## 2022-08-28 PROCEDURE — 83880 ASSAY OF NATRIURETIC PEPTIDE: CPT

## 2022-08-28 PROCEDURE — 96376 TX/PRO/DX INJ SAME DRUG ADON: CPT

## 2022-08-28 PROCEDURE — 2500000003 HC RX 250 WO HCPCS: Performed by: PHYSICIAN ASSISTANT

## 2022-08-28 PROCEDURE — 93005 ELECTROCARDIOGRAM TRACING: CPT | Performed by: PHYSICIAN ASSISTANT

## 2022-08-28 PROCEDURE — 83690 ASSAY OF LIPASE: CPT

## 2022-08-28 PROCEDURE — 6360000002 HC RX W HCPCS: Performed by: STUDENT IN AN ORGANIZED HEALTH CARE EDUCATION/TRAINING PROGRAM

## 2022-08-28 PROCEDURE — 71045 X-RAY EXAM CHEST 1 VIEW: CPT

## 2022-08-28 PROCEDURE — 36415 COLL VENOUS BLD VENIPUNCTURE: CPT

## 2022-08-28 PROCEDURE — 99285 EMERGENCY DEPT VISIT HI MDM: CPT

## 2022-08-28 PROCEDURE — 81003 URINALYSIS AUTO W/O SCOPE: CPT

## 2022-08-28 PROCEDURE — 96374 THER/PROPH/DIAG INJ IV PUSH: CPT

## 2022-08-28 PROCEDURE — 2580000003 HC RX 258: Performed by: PHYSICIAN ASSISTANT

## 2022-08-28 PROCEDURE — 6360000004 HC RX CONTRAST MEDICATION: Performed by: PHYSICIAN ASSISTANT

## 2022-08-28 PROCEDURE — 96375 TX/PRO/DX INJ NEW DRUG ADDON: CPT

## 2022-08-28 PROCEDURE — 83605 ASSAY OF LACTIC ACID: CPT

## 2022-08-28 PROCEDURE — 99222 1ST HOSP IP/OBS MODERATE 55: CPT | Performed by: STUDENT IN AN ORGANIZED HEALTH CARE EDUCATION/TRAINING PROGRAM

## 2022-08-28 PROCEDURE — 84484 ASSAY OF TROPONIN QUANT: CPT

## 2022-08-28 PROCEDURE — 6360000002 HC RX W HCPCS: Performed by: PHYSICIAN ASSISTANT

## 2022-08-28 PROCEDURE — 85025 COMPLETE CBC W/AUTO DIFF WBC: CPT

## 2022-08-28 PROCEDURE — 6370000000 HC RX 637 (ALT 250 FOR IP): Performed by: STUDENT IN AN ORGANIZED HEALTH CARE EDUCATION/TRAINING PROGRAM

## 2022-08-28 PROCEDURE — 1210000000 HC MED SURG R&B

## 2022-08-28 RX ORDER — MAGNESIUM SULFATE IN WATER 40 MG/ML
2000 INJECTION, SOLUTION INTRAVENOUS PRN
Status: DISCONTINUED | OUTPATIENT
Start: 2022-08-28 | End: 2022-09-02 | Stop reason: HOSPADM

## 2022-08-28 RX ORDER — MORPHINE SULFATE 4 MG/ML
4 INJECTION, SOLUTION INTRAMUSCULAR; INTRAVENOUS ONCE
Status: COMPLETED | OUTPATIENT
Start: 2022-08-28 | End: 2022-08-28

## 2022-08-28 RX ORDER — NIFEDIPINE 30 MG/1
60 TABLET, EXTENDED RELEASE ORAL DAILY
Status: DISCONTINUED | OUTPATIENT
Start: 2022-08-29 | End: 2022-08-31

## 2022-08-28 RX ORDER — ONDANSETRON 4 MG/1
4 TABLET, ORALLY DISINTEGRATING ORAL EVERY 8 HOURS PRN
Status: DISCONTINUED | OUTPATIENT
Start: 2022-08-28 | End: 2022-09-02 | Stop reason: HOSPADM

## 2022-08-28 RX ORDER — 0.9 % SODIUM CHLORIDE 0.9 %
80 INTRAVENOUS SOLUTION INTRAVENOUS ONCE
Status: DISCONTINUED | OUTPATIENT
Start: 2022-08-28 | End: 2022-09-02 | Stop reason: HOSPADM

## 2022-08-28 RX ORDER — ENOXAPARIN SODIUM 100 MG/ML
40 INJECTION SUBCUTANEOUS DAILY
Status: DISCONTINUED | OUTPATIENT
Start: 2022-08-29 | End: 2022-08-31

## 2022-08-28 RX ORDER — MORPHINE SULFATE 2 MG/ML
2 INJECTION, SOLUTION INTRAMUSCULAR; INTRAVENOUS EVERY 4 HOURS PRN
Status: DISCONTINUED | OUTPATIENT
Start: 2022-08-28 | End: 2022-08-29

## 2022-08-28 RX ORDER — POLYETHYLENE GLYCOL 3350 17 G/17G
17 POWDER, FOR SOLUTION ORAL DAILY PRN
Status: DISCONTINUED | OUTPATIENT
Start: 2022-08-28 | End: 2022-09-02 | Stop reason: HOSPADM

## 2022-08-28 RX ORDER — 0.9 % SODIUM CHLORIDE 0.9 %
1000 INTRAVENOUS SOLUTION INTRAVENOUS ONCE
Status: COMPLETED | OUTPATIENT
Start: 2022-08-28 | End: 2022-08-28

## 2022-08-28 RX ORDER — ACETAMINOPHEN 325 MG/1
650 TABLET ORAL EVERY 6 HOURS PRN
Status: DISCONTINUED | OUTPATIENT
Start: 2022-08-28 | End: 2022-09-02 | Stop reason: HOSPADM

## 2022-08-28 RX ORDER — LABETALOL HYDROCHLORIDE 5 MG/ML
10 INJECTION, SOLUTION INTRAVENOUS ONCE
Status: COMPLETED | OUTPATIENT
Start: 2022-08-28 | End: 2022-08-28

## 2022-08-28 RX ORDER — SODIUM CHLORIDE 9 MG/ML
INJECTION, SOLUTION INTRAVENOUS PRN
Status: DISCONTINUED | OUTPATIENT
Start: 2022-08-28 | End: 2022-09-02 | Stop reason: HOSPADM

## 2022-08-28 RX ORDER — FENTANYL CITRATE 50 UG/ML
50 INJECTION, SOLUTION INTRAMUSCULAR; INTRAVENOUS ONCE
Status: COMPLETED | OUTPATIENT
Start: 2022-08-28 | End: 2022-08-28

## 2022-08-28 RX ORDER — FENTANYL CITRATE 50 UG/ML
25 INJECTION, SOLUTION INTRAMUSCULAR; INTRAVENOUS ONCE
Status: COMPLETED | OUTPATIENT
Start: 2022-08-28 | End: 2022-08-28

## 2022-08-28 RX ORDER — POTASSIUM CHLORIDE 7.45 MG/ML
10 INJECTION INTRAVENOUS PRN
Status: DISCONTINUED | OUTPATIENT
Start: 2022-08-28 | End: 2022-09-02 | Stop reason: HOSPADM

## 2022-08-28 RX ORDER — MORPHINE SULFATE 2 MG/ML
INJECTION, SOLUTION INTRAMUSCULAR; INTRAVENOUS
Status: DISPENSED
Start: 2022-08-28 | End: 2022-08-29

## 2022-08-28 RX ORDER — POTASSIUM CHLORIDE 20 MEQ/1
40 TABLET, EXTENDED RELEASE ORAL PRN
Status: DISCONTINUED | OUTPATIENT
Start: 2022-08-28 | End: 2022-09-02 | Stop reason: HOSPADM

## 2022-08-28 RX ORDER — SODIUM CHLORIDE 9 MG/ML
INJECTION, SOLUTION INTRAVENOUS CONTINUOUS
Status: DISCONTINUED | OUTPATIENT
Start: 2022-08-28 | End: 2022-09-02 | Stop reason: HOSPADM

## 2022-08-28 RX ORDER — HYDRALAZINE HYDROCHLORIDE 25 MG/1
25 TABLET, FILM COATED ORAL EVERY 8 HOURS SCHEDULED
Status: DISCONTINUED | OUTPATIENT
Start: 2022-08-28 | End: 2022-08-31

## 2022-08-28 RX ORDER — SODIUM CHLORIDE 0.9 % (FLUSH) 0.9 %
5-40 SYRINGE (ML) INJECTION EVERY 12 HOURS SCHEDULED
Status: DISCONTINUED | OUTPATIENT
Start: 2022-08-28 | End: 2022-09-02 | Stop reason: HOSPADM

## 2022-08-28 RX ORDER — ACETAMINOPHEN 650 MG/1
650 SUPPOSITORY RECTAL EVERY 6 HOURS PRN
Status: DISCONTINUED | OUTPATIENT
Start: 2022-08-28 | End: 2022-09-02 | Stop reason: HOSPADM

## 2022-08-28 RX ORDER — ONDANSETRON 2 MG/ML
4 INJECTION INTRAMUSCULAR; INTRAVENOUS EVERY 6 HOURS PRN
Status: DISCONTINUED | OUTPATIENT
Start: 2022-08-28 | End: 2022-09-02 | Stop reason: HOSPADM

## 2022-08-28 RX ORDER — SODIUM CHLORIDE 0.9 % (FLUSH) 0.9 %
5-40 SYRINGE (ML) INJECTION PRN
Status: DISCONTINUED | OUTPATIENT
Start: 2022-08-28 | End: 2022-09-02 | Stop reason: HOSPADM

## 2022-08-28 RX ORDER — SODIUM CHLORIDE 0.9 % (FLUSH) 0.9 %
10 SYRINGE (ML) INJECTION PRN
Status: DISCONTINUED | OUTPATIENT
Start: 2022-08-28 | End: 2022-09-02 | Stop reason: HOSPADM

## 2022-08-28 RX ORDER — ONDANSETRON 2 MG/ML
4 INJECTION INTRAMUSCULAR; INTRAVENOUS ONCE
Status: COMPLETED | OUTPATIENT
Start: 2022-08-28 | End: 2022-08-28

## 2022-08-28 RX ADMIN — Medication 80 ML: at 17:41

## 2022-08-28 RX ADMIN — LABETALOL HYDROCHLORIDE 10 MG: 5 INJECTION INTRAVENOUS at 18:17

## 2022-08-28 RX ADMIN — SODIUM CHLORIDE, PRESERVATIVE FREE 10 ML: 5 INJECTION INTRAVENOUS at 17:40

## 2022-08-28 RX ADMIN — IOPAMIDOL 100 ML: 755 INJECTION, SOLUTION INTRAVENOUS at 17:32

## 2022-08-28 RX ADMIN — MORPHINE SULFATE 4 MG: 4 INJECTION INTRAVENOUS at 19:32

## 2022-08-28 RX ADMIN — SODIUM CHLORIDE: 9 INJECTION, SOLUTION INTRAVENOUS at 22:42

## 2022-08-28 RX ADMIN — FENTANYL CITRATE 25 MCG: 50 INJECTION, SOLUTION INTRAMUSCULAR; INTRAVENOUS at 17:13

## 2022-08-28 RX ADMIN — SODIUM CHLORIDE 1000 ML: 9 INJECTION, SOLUTION INTRAVENOUS at 16:55

## 2022-08-28 RX ADMIN — MORPHINE SULFATE 2 MG: 2 INJECTION, SOLUTION INTRAMUSCULAR; INTRAVENOUS at 22:45

## 2022-08-28 RX ADMIN — HYDRALAZINE HYDROCHLORIDE 25 MG: 25 TABLET, FILM COATED ORAL at 22:41

## 2022-08-28 RX ADMIN — FENTANYL CITRATE 25 MCG: 50 INJECTION, SOLUTION INTRAMUSCULAR; INTRAVENOUS at 16:53

## 2022-08-28 RX ADMIN — FENTANYL CITRATE 50 MCG: 50 INJECTION, SOLUTION INTRAMUSCULAR; INTRAVENOUS at 18:32

## 2022-08-28 RX ADMIN — ONDANSETRON 4 MG: 2 INJECTION INTRAMUSCULAR; INTRAVENOUS at 19:32

## 2022-08-28 ASSESSMENT — PAIN DESCRIPTION - LOCATION
LOCATION: ABDOMEN

## 2022-08-28 ASSESSMENT — PAIN DESCRIPTION - ORIENTATION: ORIENTATION: MID

## 2022-08-28 ASSESSMENT — PAIN - FUNCTIONAL ASSESSMENT
PAIN_FUNCTIONAL_ASSESSMENT: 0-10
PAIN_FUNCTIONAL_ASSESSMENT: PREVENTS OR INTERFERES SOME ACTIVE ACTIVITIES AND ADLS

## 2022-08-28 ASSESSMENT — PAIN DESCRIPTION - ONSET: ONSET: ON-GOING

## 2022-08-28 ASSESSMENT — PAIN SCALES - GENERAL
PAINLEVEL_OUTOF10: 8
PAINLEVEL_OUTOF10: 10
PAINLEVEL_OUTOF10: 8
PAINLEVEL_OUTOF10: 10
PAINLEVEL_OUTOF10: 6
PAINLEVEL_OUTOF10: 0
PAINLEVEL_OUTOF10: 8
PAINLEVEL_OUTOF10: 10
PAINLEVEL_OUTOF10: 8

## 2022-08-28 ASSESSMENT — PAIN DESCRIPTION - PAIN TYPE
TYPE: ACUTE PAIN

## 2022-08-28 ASSESSMENT — PAIN DESCRIPTION - DESCRIPTORS: DESCRIPTORS: ACHING;SORE

## 2022-08-28 ASSESSMENT — PAIN DESCRIPTION - FREQUENCY: FREQUENCY: CONTINUOUS

## 2022-08-28 ASSESSMENT — LIFESTYLE VARIABLES
HOW OFTEN DO YOU HAVE A DRINK CONTAINING ALCOHOL: NEVER
HOW MANY STANDARD DRINKS CONTAINING ALCOHOL DO YOU HAVE ON A TYPICAL DAY: PATIENT DOES NOT DRINK

## 2022-08-28 NOTE — ED PROVIDER NOTES
[id number] [facility]  [address] [facility] [address]  Phone: [phone]  Fax: [phone] [facility] Name: Danielle Guevara  MRN: [id number]  [place] 1940  Date of evaluation: 8/28/22      CHIEF COMPLAINT:  Chief Complaint   Patient presents with    Abdominal Pain    Drug Overdose       HISTORY OF PRESENT ILLNESS    Danielle Guevara is a 79 y.o. female who presents with GI complaints:       Timing/Onset:   3 days  Context/Setting: Patient here with  for evaluation of intermittent but increasing abdominal pain that she states is more mid abdomen in nature. She states that she has been tolerating all oral intake and she is having no fever/chills/nausea/vomiting or any stooling changes. She has no radiation of pain to the pelvis or chest.  She does report some intermittent pain to the back. She denies having any significant cardiac history. Remote hysterectomy history reported. She is having no radiation of pain or any other paresthesias weakness of the lower extremities.  confirms that patient has been taken omeprazole he noticed that there was 10 fewer than he thought he saw in the pill container, she is unable to recall if she took all of these. Patient is also been on some doxycycline for a skin tear of the left hand following a fall earlier in the week. There is no associated lightheadedness or syncope      Quality:  unable to describe  Duration: constant     Modifying Factors: Worse with movement  Severity: moderate        Nursing Notes were reviewed. REVIEW OF SYSTEMS       Constitutional: per HPI  Eyes: No visual changes. Neck: No neck pain. Respiratory: Denies recent shortness of breath. Cardiac:  Denies recent chest pain or palpitations  GI:  Per HPI  : Per HPI    Musculoskeletal: per HPI   Neurologic: Denies headache or focal weakness. Skin:  Denies any rash.       Negative in 10 essential Systems except as mentioned above and in the HPI. PAST MEDICAL HISTORY   PMH:  has a past medical history of Accelerated hypertension, Atypical chest pain, Hyperlipidemia, Hypertension, and Labile blood pressure. Surgical History:  has a past surgical history that includes Hysterectomy; Abdomen surgery; LIGATION OF SAPHENOUS VEIN; and bladder suspension. Social History:  reports that she has quit smoking. She has never used smokeless tobacco. She reports that she does not currently use alcohol. She reports that she does not use drugs. Family History: None  Psychiatric History: None    Allergies:is allergic to morphine, doxycycline, azithromycin, and ciprofloxacin. PHYSICAL EXAM     INITIAL VITALS: BP (!) 167/107   Pulse (!) 102   Temp 97.5 °F (36.4 °C) (Oral)   Resp 20   Ht 5' 1\" (1.549 m)   Wt 52.2 kg (115 lb)   SpO2 98%   BMI 21.73 kg/m²   Constitutional:  Well developed , anxious with some mild pain distress. Eyes:  Pupils equal/round  HENT:  Atraumatic, external ears normal, nose normal, oropharynx moist.   Respiratory:  Clear to auscultation bilaterally with good air exchange, no W/R/R  Cardiovascular:  RRR with normal S1 and S2  Gastrointestinal/Abdomen:  Soft, generalized TTP but gestures to periumbilical area is most focal pain., BS present. No mass or distention appreciated. Musculoskeletal:  Normal to inspection  Back:  No midline/paraspinal TTP. No bilat CVA/flank TTP. Normal to inspection. Integument:  No rash. Neurologic:  Alert & age appropriate mentation-interaction      DIAGNOSTIC RESULTS     EKG: All EKG's are interpreted by the Emergency Department Physician who either signs or Co-signs this chart in the absence of a cardiologist.    Not indicated    RADIOLOGY:   Reviewed the radiologist:  XR CHEST PORTABLE   Final Result   Possible mild pulmonary vascular congestion         CTA ABDOMEN PELVIS W CONTRAST   Final Result   1.  Findings of acute uncomplicated pancreatitis with secondary reactive edematous wall thickening of the duodenum. 2. Small amount of reactive free fluid throughout the peritoneal cavity. No   evidence of abscess. 3. Mild-to-moderate atherosclerotic disease as described above. No aortic   aneurysm or dissection.                    LABS:  Labs Reviewed   CBC WITH AUTO DIFFERENTIAL - Abnormal; Notable for the following components:       Result Value    WBC 11.6 (*)     Hematocrit 46.9 (*)     Seg Neutrophils 92 (*)     Lymphocytes 0 (*)     Eosinophils % 0 (*)     Metamyelocytes 1 (*)     Segs Absolute 10.67 (*)     Absolute Lymph # 0.00 (*)     Absolute Mono # 0.81 (*)     Metamyelocytes Absolute 0.12 (*)     All other components within normal limits   COMPREHENSIVE METABOLIC PANEL W/ REFLEX TO MG FOR LOW K - Abnormal; Notable for the following components:    Glucose 159 (*)     BUN 24 (*)     Creatinine 0.93 (*)     Sodium 121 (*)     Potassium 3.3 (*)     Chloride 84 (*)     CO2 17 (*)     Anion Gap 20 (*)     Alkaline Phosphatase 118 (*)     AST 42 (*)     Albumin 3.4 (*)     Albumin/Globulin Ratio 0.9 (*)     GFR Non- 58 (*)     All other components within normal limits   LIPASE - Abnormal; Notable for the following components:    Lipase 1,349 (*)     All other components within normal limits   TROPONIN - Abnormal; Notable for the following components:    Troponin, High Sensitivity 26 (*)     All other components within normal limits   LACTIC ACID - Abnormal; Notable for the following components:    Lactic Acid 5.5 (*)     All other components within normal limits   BRAIN NATRIURETIC PEPTIDE - Abnormal; Notable for the following components:    Pro-BNP 1,774 (*)     All other components within normal limits   MAGNESIUM - Abnormal; Notable for the following components:    Magnesium 1.4 (*)     All other components within normal limits   LACTIC ACID - Abnormal; Notable for the following components:    Lactic Acid 5.4 (*)     All other components within normal limits   URINALYSIS WITH REFLEX TO CULTURE         EMERGENCY DEPARTMENT COURSE, DDX and MDM:     1622  Diffuse abdominal pain without distention, no constitutional symptoms but patient is hypertensive without a history of this. No associated chest/respiratory/urinary or stooling changes. Some question whether not patient had taken too many doxycycline or omeprazole recently. No effect on appetite or oral intake. Getting a CTA of the abdomen and pelvis as well as a cardiac work-up. Nontoxic. She is not hypoxic/tachycardic or tachypneic. [address] [place] [address]  Dr Ajay Rojo contacted about this pt and an admit. He evaluated her here in the ED. Pt/ informed of our findings/Pancreatitis, they were agreeable to this plan. Orders Placed This Encounter   Medications    0.9 % sodium chloride bolus    fentaNYL (SUBLIMAZE) injection 25 mcg    fentaNYL (SUBLIMAZE) injection 25 mcg    0.9 % sodium chloride bolus    iopamidol (ISOVUE-370) 76 % injection 100 mL    sodium chloride flush 0.9 % injection 10 mL    labetalol (NORMODYNE;TRANDATE) injection 10 mg    fentaNYL (SUBLIMAZE) injection 50 mcg    morphine injection 4 mg    ondansetron (ZOFRAN) injection 4 mg       CONSULTS:  None      FINAL IMPRESSION      1. Acute pancreatitis, unspecified complication status, unspecified pancreatitis type          DISPOSITION/PLAN:  DISPOSITION Admitted 08/28/2022 06:40:57 PM        PATIENT REFERRED TO:  No follow-up provider specified.     DISCHARGE MEDICATIONS:  New Prescriptions    No medications on file       (Please note that portions of this note were completed with a voice recognition program.  Efforts were made to edit the dictations but occasionally words are mis-transcribed.)    JOSSE Royal PA-C  08/28/22 0563

## 2022-08-28 NOTE — ED PROVIDER NOTES
Emergency Department           COMPLAINT       Chief Complaint   Patient presents with    Abdominal Pain    Drug Overdose      PHYSICAL EXAM      ED Triage Vitals [08/28/22 1555]   BP Temp Temp Source Heart Rate Resp SpO2 Height Weight   (!) 213/109 97.5 °F (36.4 °C) Oral 98 18 97 % 5' 1\" (1.549 m) 115 lb (52.2 kg)         Constitutional: Alert, nontoxic,  acting properly for age, in no acute distress   HEENT: Extraocular muscles intact  Neck: Trachea midline   Cardiovascular: Regular rhythm and rate no murmurs   Respiratory: Clear to auscultation bilaterally no wheezes, rhonchi, rales, no respiratory distress no tachypnea no retractions no hypoxia  Gastrointestinal: Soft, diffuse abdominal tenderness to palpation no abdominal bruit no pulsatile mass, nondistended, diminished bowel sounds. No rebound, rigidity, or guarding. Musculoskeletal: No extremity pain or swelling   Neurologic: Moving all 4 extremities  Skin: Warm and dry       Physical Exam  DIAGNOSTIC RESULTS     EKG: All EKG's are interpreted by the Emergency Department Physician who either signs or Co-signs this chart in the absence of a cardiologist.    1626 sinus rhythm rate 96    no acute ST or T wave changes.     Not indicated unless otherwise documented above or in the midlevel documentation    LABS:  Results for orders placed or performed during the hospital encounter of 08/28/22   CBC with Auto Differential   Result Value Ref Range    WBC 11.6 (H) 3.5 - 11.0 k/uL    RBC 5.10 4.0 - 5.2 m/uL    Hemoglobin 15.8 12.0 - 16.0 g/dL    Hematocrit 46.9 (H) 36 - 46 %    MCV 92.0 80 - 100 fL    MCH 30.9 26 - 34 pg    MCHC 33.6 31 - 37 g/dL    RDW 15.0 12.5 - 15.4 %    Platelets 496 113 - 924 k/uL    MPV 9.1 6.0 - 12.0 fL    Seg Neutrophils 92 (H) 36 - 66 %    Lymphocytes 0 (L) 24 - 44 %    Monocytes 7 1 - 7 %    Eosinophils % 0 (L) 1 - 4 %    Basophils 0 0 - 2 %    Metamyelocytes 1 (H) 0 %    Segs Absolute 10.67 (H) 1.8 - 7.7 k/uL    Absolute Lymph # 0.00 (L) 1.0 - 4.8 k/uL    Absolute Mono # 0.81 (H) 0.1 - 0.8 k/uL    Absolute Eos # 0.00 0.0 - 0.4 k/uL    Basophils Absolute 0.00 0.0 - 0.2 k/uL    Metamyelocytes Absolute 0.12 (H) 0 k/uL    Morphology LARGE PLATELETS PRESENT    Comprehensive Metabolic Panel w/ Reflex to MG   Result Value Ref Range    Glucose 159 (H) 70 - 99 mg/dL    BUN 24 (H) 8 - 23 mg/dL    Creatinine 0.93 (H) 0.50 - 0.90 mg/dL    Calcium 10.3 8.6 - 10.4 mg/dL    Sodium 121 (L) 135 - 144 mmol/L    Potassium 3.3 (L) 3.7 - 5.3 mmol/L    Chloride 84 (L) 98 - 107 mmol/L    CO2 17 (L) 20 - 31 mmol/L    Anion Gap 20 (H) 9 - 17 mmol/L    Alkaline Phosphatase 118 (H) 35 - 104 U/L    ALT 33 5 - 33 U/L    AST 42 (H) <32 U/L    Total Bilirubin 0.38 0.3 - 1.2 mg/dL    Total Protein 7.1 6.4 - 8.3 g/dL    Albumin 3.4 (L) 3.5 - 5.2 g/dL    Albumin/Globulin Ratio 0.9 (L) 1.0 - 2.5    GFR Non- 58 (L) >60 mL/min    GFR African American >60 >60 mL/min    GFR Comment         Lipase   Result Value Ref Range    Lipase 1,349 (H) 13 - 60 U/L   Urinalysis with Reflex to Culture    Specimen: Urine   Result Value Ref Range    Color, UA Yellow Yellow    Turbidity UA Clear Clear    Glucose, Ur NEGATIVE NEGATIVE    Bilirubin Urine NEGATIVE NEGATIVE    Ketones, Urine NEGATIVE NEGATIVE    Specific Gravity, UA 1.010 1.005 - 1.030    Urine Hgb NEGATIVE NEGATIVE    pH, UA 6.5 5.0 - 8.0    Protein, UA NEGATIVE NEGATIVE    Urobilinogen, Urine Normal Normal    Nitrite, Urine NEGATIVE NEGATIVE    Leukocyte Esterase, Urine NEGATIVE NEGATIVE    Urinalysis Comments       Microscopic exam not performed based on chemical results unless requested in original order. Urinalysis Comments          Urinalysis Comments       Utilizing a urinalysis as the only screening method to exclude a potential uropathogen can be unreliable in many patient populations.   Rapid screening tests are less sensitive than culture and if UTI is a clinical possibility, culture should be considered despite a negative urinalysis. Troponin   Result Value Ref Range    Troponin, High Sensitivity 26 (H) 0 - 14 ng/L   Lactic Acid   Result Value Ref Range    Lactic Acid 5.5 (H) 0.5 - 2.2 mmol/L   Brain Natriuretic Peptide   Result Value Ref Range    Pro-BNP 1,774 (H) <300 pg/mL   Magnesium   Result Value Ref Range    Magnesium 1.4 (L) 1.6 - 2.6 mg/dL       Not indicated unless otherwise documented above or in the midlevel documentation    RADIOLOGY:   I reviewedthe radiologist interpretations:  XR CHEST PORTABLE   Final Result   Possible mild pulmonary vascular congestion         CTA ABDOMEN PELVIS W CONTRAST   Final Result   1. Findings of acute uncomplicated pancreatitis with secondary reactive   edematous wall thickening of the duodenum. 2. Small amount of reactive free fluid throughout the peritoneal cavity. No   evidence of abscess. 3. Mild-to-moderate atherosclerotic disease as described above. No aortic   aneurysm or dissection. Not indicated unless otherwise documented above or in the midlevel documentation    EMERGENCY DEPARTMENT COURSE:       PERTINENT ATTENDING PHYSICIAN COMMENTS:    Diffuse abdominal pain. Took 10 omeprazole according to her  as well as finished an entire prescription of doxycycline that was prescribed 4 days ago for a possible hand infection. IV labs. Patient hypertensive will address with pain medicine initially. CT abdomen pelvis. 6:15 PM significantly elevated lipase with an elevated lactic acid level. Slight elevated troponin with history of the same. White blood cell count 11.6 no anemia. Urine no signs of infection. CT shows uncomplicated pancreatitis. Will admit. Faculty Attestation    I performed a history and physical examination of the patient and discussed management with the mid level provideer. I reviewed the mid level provider's note and agree with the documented findings and plan of care. Any areas of disagreement are noted on the chart. I was personally present for the key portions of any procedures. I have documented in the chart those procedures where I was not present during the key portions. I have reviewed the emergency nurses triage note. I agree with the chief complaint, past medical history, past surgical history, allergies, medications, social and family history as documented unless otherwise noted below. Documentation of the HPI, Physical Exam and Medical Decision Making performed by medical students or scribes is based on my personal performance of the HPI, PE and MDM. For Physician Assistant/ Nurse Practitioner cases/documentation I have personally evaluated this patient and have completed at least one if not all key elements of the E/M (history, physical exam, and MDM). Additional findings are as noted.      Melody Boucher DO  08/28/22 1810

## 2022-08-28 NOTE — ED TRIAGE NOTES
Patient's  reports, patient has been complaining of abdominal pain for the past 3 days. Patient took omeprazole 20 mg x 10 tablets yesterday. She was prescribed Doxycyline 100 mg BID x 10 days, on 8/24 for a skin rash by her dermatologist, patient's  states that he noticed yesterday that all of those pills are now gone. This RN called to confirm Doxycyline prescription with patient's pharmacy, as patient and her  were unsure of medication name.

## 2022-08-28 NOTE — H&P
[facility]  Office: [address], DO, Liang Simper, DO, Bryanna Bolivar, DO, Roman Elainen Blood, DO, Linda Lock MD, Orion Guevara MD, Lupillo Crenshaw MD, Annie Castillo MD,  Sarita De Los Santos MD, Barbra Proctor MD, Azeb Cooney, DO, Diane Bertrand MD,  Tyson Vivar MD, Zeinab Parry MD, Palma Hernandez, DO, Claudia Brown MD, Kinza Banuelos MD, Sandrita Edwards MD, Shane Horan, DO, Nikhil Early MD, Ethan Schmitt MD, Birtha Members, CNP,  Vincent Davis, CNP, Ji Ramsey, CNP, Mack Le, CNP, Charles Cochran PADuaneC, Jorge Freire, DNP, Tiago Herrera, CNP, Kristen Wen, CNP, Lila Winters, CNP, Liliam Lambert, CNP, Anil Fischer, CNP, Honey Montoya, CNS, Neeraj Horn, [facility], Matheus Portillo, CNP, Anna Storey, CNP, Earfranko Conley, [facility]           [facility]    HISTORY AND PHYSICAL EXAMINATION            Date:   8/28/2022  Patient name:  Sera Medina  Date of admission:  8/28/2022  3:48 PM  MRN:   [id number]  Account:  [de-identified]  YOB: 1940  PCP:    Jam Albarran MD  Room:   ER10/ER10  Code Status:    Prior    Chief Complaint:     Chief Complaint   Patient presents with    Abdominal Pain    Drug Overdose     History Obtained From:     Patient; family member -     History of Present Illness:     Sera Medina is a 79 y.o. female with a past medical history of dementia who presented to the emergency department on 8/28/2022 complaining of abdominal pain, nausea/vomiting and anorexia. The patient's  states that her symptoms began several days ago and have progressively worsened. He states that she has had very little to eat or drink over the past week. In the ED, the patient was afebrile and nontoxic appearing but hypertensive and tachycardic. Lipase was found to be elevated at 1349. CT scan of the abdomen and pelvis was remarkable for acute pancreatitis.  The patient is admitted to internal medicine for further management. Past Medical History:     Past Medical History:   Diagnosis Date    Accelerated hypertension     Atypical chest pain     Hyperlipidemia     Hypertension     Labile blood pressure         Past Surgical History:     Past Surgical History:   Procedure Laterality Date    ABDOMEN SURGERY      colon resection    BLADDER SUSPENSION      HYSTERECTOMY (CERVIX STATUS UNKNOWN)      LIGATION OF SAPHENOUS VEIN          Medications Prior to Admission:     Prior to Admission medications    Medication Sig Start Date End Date Taking? Authorizing Provider   ferrous sulfate (FE TABS 325) 325 (65 Fe) MG EC tablet Take 1 tablet by mouth daily (with breakfast)  Patient not taking: Reported on 8/28/2022 2/3/21   Nabil Quintanilla APRN - NP   pantoprazole (PROTONIX) 40 MG tablet Take 1 tablet by mouth daily 1/9/20   Maylin Castellanos DO   Omega-3 Fatty Acids (FISH OIL PO) Take by mouth    Historical Provider, MD   Cholecalciferol (VITAMIN D3) 50 MCG (2000 UT) CAPS Take by mouth    Historical Provider, MD   Misc Natural Products (GLUCOSAMINE-CHONDROITIN PLUS PO) Take by mouth    Historical Provider, MD        Allergies:     Morphine, Doxycycline, Azithromycin, and Ciprofloxacin    Social History:     Tobacco:    reports that she has quit smoking. She has never used smokeless tobacco.  Alcohol:      reports that she does not currently use alcohol. Drug Use:  reports no history of drug use. Family History:     Family History   Problem Relation Age of Onset    Heart Disease Mother 78    Alzheimer's Disease Father 67    Heart Attack Brother 62    Heart Disease Brother     Heart Disease Brother        Review of Systems:     Positive and Negative as described in HPI.     CONSTITUTIONAL:  negative for fevers, chills, sweats, fatigue, weight loss  HEENT:  negative for vision, hearing changes, runny nose, throat pain  RESPIRATORY:  negative for shortness of breath, cough, congestion, wheezing  CARDIOVASCULAR:  negative for chest pain, palpitations  GASTROINTESTINAL:  Positive for abdominal pain, nausea and anorexia   GENITOURINARY:  negative for difficulty of urination, burning with urination, frequency   INTEGUMENT:  negative for rash, skin lesions, easy bruising   HEMATOLOGIC/LYMPHATIC:  negative for swelling/edema   ALLERGIC/IMMUNOLOGIC:  negative for urticaria , itching  ENDOCRINE:  negative increase in drinking, increase in urination, hot or cold intolerance  MUSCULOSKELETAL:  negative joint pains, muscle aches, swelling of joints  NEUROLOGICAL:  negative for headaches, dizziness, lightheadedness, numbness, pain, tingling extremities  BEHAVIOR/PSYCH:  negative for depression, anxiety    Physical Exam:   BP (!) 168/113   Pulse 95   Temp 97.5 °F (36.4 °C) (Oral)   Resp 22   Ht 5' 1\" (1.549 m)   Wt 115 lb (52.2 kg)   SpO2 98%   BMI 21.73 kg/m²   Temp (24hrs), Av.5 °F (36.4 °C), Min:97.5 °F (36.4 °C), Max:97.5 °F (36.4 °C)    No results for input(s): POCGLU in the last 72 hours. Intake/Output Summary (Last 24 hours) at 2022 1917  Last data filed at 2022 1843  Gross per 24 hour   Intake 1800 ml   Output --   Net 1800 ml       General Appearance:  Frail and chronically ill-appearing.    Mental status: oriented only to self   Head:  normocephalic, atraumatic  Eye: no icterus, redness, pupils equal and reactive, extraocular eye movements intact, conjunctiva clear  Ear: normal external ear, no discharge, hearing intact  Nose:  no drainage noted  Mouth: mucous membranes moist  Neck: supple, no carotid bruits, thyroid not palpable  Lungs: Bilateral equal air entry, clear to ausculation, no wheezing, rales or rhonchi, normal effort  Cardiovascular: normal rate, regular rhythm, no murmur, gallop, rub  Abdomen: Tender to deep palpation   Neurologic: There are no new focal motor or sensory deficits, normal muscle tone and bulk, no abnormal sensation, normal speech, cranial nerves II through XII grossly intact  Skin: No gross lesions, rashes, bruising or bleeding on exposed skin area  Extremities:  peripheral pulses palpable, no pedal edema or calf pain with palpation  Psych: normal affect     Investigations:      Laboratory Testing:  Recent Results (from the past 24 hour(s))   CBC with Auto Differential    Collection Time: 08/28/22  4:32 PM   Result Value Ref Range    WBC 11.6 (H) 3.5 - 11.0 k/uL    RBC 5.10 4.0 - 5.2 m/uL    Hemoglobin 15.8 12.0 - 16.0 g/dL    Hematocrit 46.9 (H) 36 - 46 %    MCV 92.0 80 - 100 fL    MCH 30.9 26 - 34 pg    MCHC 33.6 31 - 37 g/dL    RDW 15.0 12.5 - 15.4 %    Platelets 776 096 - 999 k/uL    MPV 9.1 6.0 - 12.0 fL    Seg Neutrophils 92 (H) 36 - 66 %    Lymphocytes 0 (L) 24 - 44 %    Monocytes 7 1 - 7 %    Eosinophils % 0 (L) 1 - 4 %    Basophils 0 0 - 2 %    Metamyelocytes 1 (H) 0 %    Segs Absolute 10.67 (H) 1.8 - 7.7 k/uL    Absolute Lymph # 0.00 (L) 1.0 - 4.8 k/uL    Absolute Mono # 0.81 (H) 0.1 - 0.8 k/uL    Absolute Eos # 0.00 0.0 - 0.4 k/uL    Basophils Absolute 0.00 0.0 - 0.2 k/uL    Metamyelocytes Absolute 0.12 (H) 0 k/uL    Morphology LARGE PLATELETS PRESENT    Comprehensive Metabolic Panel w/ Reflex to MG    Collection Time: 08/28/22  4:32 PM   Result Value Ref Range    Glucose 159 (H) 70 - 99 mg/dL    BUN 24 (H) 8 - 23 mg/dL    Creatinine 0.93 (H) 0.50 - 0.90 mg/dL    Calcium 10.3 8.6 - 10.4 mg/dL    Sodium 121 (L) 135 - 144 mmol/L    Potassium 3.3 (L) 3.7 - 5.3 mmol/L    Chloride 84 (L) 98 - 107 mmol/L    CO2 17 (L) 20 - 31 mmol/L    Anion Gap 20 (H) 9 - 17 mmol/L    Alkaline Phosphatase 118 (H) 35 - 104 U/L    ALT 33 5 - 33 U/L    AST 42 (H) <32 U/L    Total Bilirubin 0.38 0.3 - 1.2 mg/dL    Total Protein 7.1 6.4 - 8.3 g/dL    Albumin 3.4 (L) 3.5 - 5.2 g/dL    Albumin/Globulin Ratio 0.9 (L) 1.0 - 2.5    GFR Non- 58 (L) >60 mL/min    GFR African American >60 >60 mL/min    GFR Comment         Lipase    Collection Time: 08/28/22  4:32 PM Result Value Ref Range    Lipase 1,349 (H) 13 - 60 U/L   Troponin    Collection Time: 08/28/22  4:32 PM   Result Value Ref Range    Troponin, High Sensitivity 26 (H) 0 - 14 ng/L   Lactic Acid    Collection Time: 08/28/22  4:32 PM   Result Value Ref Range    Lactic Acid 5.5 (H) 0.5 - 2.2 mmol/L   Brain Natriuretic Peptide    Collection Time: 08/28/22  4:32 PM   Result Value Ref Range    Pro-BNP 1,774 (H) <300 pg/mL   Magnesium    Collection Time: 08/28/22  4:32 PM   Result Value Ref Range    Magnesium 1.4 (L) 1.6 - 2.6 mg/dL   Urinalysis with Reflex to Culture    Collection Time: 08/28/22  6:05 PM    Specimen: Urine   Result Value Ref Range    Color, UA Yellow Yellow    Turbidity UA Clear Clear    Glucose, Ur NEGATIVE NEGATIVE    Bilirubin Urine NEGATIVE NEGATIVE    Ketones, Urine NEGATIVE NEGATIVE    Specific Gravity, UA 1.010 1.005 - 1.030    Urine Hgb NEGATIVE NEGATIVE    pH, UA 6.5 5.0 - 8.0    Protein, UA NEGATIVE NEGATIVE    Urobilinogen, Urine Normal Normal    Nitrite, Urine NEGATIVE NEGATIVE    Leukocyte Esterase, Urine NEGATIVE NEGATIVE    Urinalysis Comments       Microscopic exam not performed based on chemical results unless requested in original order. Urinalysis Comments          Urinalysis Comments       Utilizing a urinalysis as the only screening method to exclude a potential uropathogen can be unreliable in many patient populations. Rapid screening tests are less sensitive than culture and if UTI is a clinical possibility, culture should be considered despite a negative urinalysis. Lactic Acid    Collection Time: 08/28/22  6:17 PM   Result Value Ref Range    Lactic Acid 5.4 (H) 0.5 - 2.2 mmol/L       Imaging/Diagnostics:  XR CHEST PORTABLE    Result Date: 8/28/2022  Possible mild pulmonary vascular congestion     CTA ABDOMEN PELVIS W CONTRAST    Result Date: 8/28/2022  1. Findings of acute uncomplicated pancreatitis with secondary reactive edematous wall thickening of the duodenum.  2. Small amount of reactive free fluid throughout the peritoneal cavity. No evidence of abscess. 3. Mild-to-moderate atherosclerotic disease as described above. No aortic aneurysm or dissection. Assessment :      Hospital Problems             Last Modified POA    * (Principal) Acute pancreatitis without necrosis or infection, unspecified 8/28/2022 Yes    Hyperlipidemia 8/28/2022 Yes    Hyponatremia 8/28/2022 Yes    Dementia ([address] 8/28/2022 Yes       Plan:     Patient status inpatient in the Med/Surge    Acute pancreatitis   -CT abdomen and pelvis showing acute uncomplicated pancreatitis with secondary reactive edematous wall thickening of the duodenum   -Likely secondary to gallstones   -MRCP pending   -Maintenance fluids at 100 mL/hr   -Morphine pain panel   -Daily CBC   -Daily CMP     Hyponatremia   -Likely secondary to volume depletion from intractable nausea/abdominal pain   -Maintenance fluids as above   -Sodium q6h     Elevated lactic acid   -Likely secondary to acute pancreatitis/duodenitis   -Fluids as above   -Continue to trend     Hypertension   -Start tid hydralazine   -Likely secondary to pain   -Morphine pain panel as above     Dementia   -Patient has a history of dementia and is alert and oriented only to self on my exam ( states that her mentation waxes and wanes)   -Code status has been changed to [facility] per 's request     Consultations:   None    Patient is admitted as inpatient status because of co-morbidities listed above, severity of signs and symptoms as outlined, requirement for current medical therapies and most importantly because of direct risk to patient if care not provided in a hospital setting. Expected length of stay > 48 hours.     Rosy Cantu MD  8/28/2022  7:17 PM    Copy sent to Dr. Jodi Underwood MD

## 2022-08-29 ENCOUNTER — APPOINTMENT (OUTPATIENT)
Dept: MRI IMAGING | Age: 82
DRG: 439 | End: 2022-08-29
Payer: MEDICARE

## 2022-08-29 LAB
ABSOLUTE EOS #: 0 K/UL (ref 0–0.4)
ABSOLUTE LYMPH #: 0.26 K/UL (ref 1–4.8)
ABSOLUTE MONO #: 0.52 K/UL (ref 0.1–0.8)
BASOPHILS # BLD: 0 % (ref 0–2)
BASOPHILS ABSOLUTE: 0 K/UL (ref 0–0.2)
EKG ATRIAL RATE: 96 BPM
EKG P AXIS: 37 DEGREES
EKG P-R INTERVAL: 154 MS
EKG Q-T INTERVAL: 366 MS
EKG QRS DURATION: 102 MS
EKG QTC CALCULATION (BAZETT): 462 MS
EKG R AXIS: -34 DEGREES
EKG T AXIS: 87 DEGREES
EKG VENTRICULAR RATE: 96 BPM
EOSINOPHILS RELATIVE PERCENT: 0 % (ref 1–4)
HCT VFR BLD CALC: 44.2 % (ref 36–46)
HEMOGLOBIN: 15 G/DL (ref 12–16)
LACTIC ACID: 2.6 MMOL/L (ref 0.5–2.2)
LACTIC ACID: 2.8 MMOL/L (ref 0.5–2.2)
LACTIC ACID: 3.2 MMOL/L (ref 0.5–2.2)
LYMPHOCYTES # BLD: 2 % (ref 24–44)
MCH RBC QN AUTO: 31.1 PG (ref 26–34)
MCHC RBC AUTO-ENTMCNC: 34 G/DL (ref 31–37)
MCV RBC AUTO: 91.6 FL (ref 80–100)
MONOCYTES # BLD: 4 % (ref 1–7)
MORPHOLOGY: NORMAL
PDW BLD-RTO: 14.9 % (ref 12.5–15.4)
PLATELET # BLD: 176 K/UL (ref 140–450)
PMV BLD AUTO: 9 FL (ref 6–12)
RBC # BLD: 4.83 M/UL (ref 4–5.2)
SEG NEUTROPHILS: 94 % (ref 36–66)
SEGMENTED NEUTROPHILS ABSOLUTE COUNT: 12.32 K/UL (ref 1.8–7.7)
SODIUM BLD-SCNC: 123 MMOL/L (ref 135–144)
SODIUM BLD-SCNC: 125 MMOL/L (ref 135–144)
SODIUM BLD-SCNC: 127 MMOL/L (ref 135–144)
WBC # BLD: 13.1 K/UL (ref 3.5–11)

## 2022-08-29 PROCEDURE — 6360000002 HC RX W HCPCS: Performed by: NURSE PRACTITIONER

## 2022-08-29 PROCEDURE — 36415 COLL VENOUS BLD VENIPUNCTURE: CPT

## 2022-08-29 PROCEDURE — 83605 ASSAY OF LACTIC ACID: CPT

## 2022-08-29 PROCEDURE — 97535 SELF CARE MNGMENT TRAINING: CPT

## 2022-08-29 PROCEDURE — 85025 COMPLETE CBC W/AUTO DIFF WBC: CPT

## 2022-08-29 PROCEDURE — 74183 MRI ABD W/O CNTR FLWD CNTR: CPT

## 2022-08-29 PROCEDURE — 97162 PT EVAL MOD COMPLEX 30 MIN: CPT

## 2022-08-29 PROCEDURE — 84295 ASSAY OF SERUM SODIUM: CPT

## 2022-08-29 PROCEDURE — 6370000000 HC RX 637 (ALT 250 FOR IP): Performed by: STUDENT IN AN ORGANIZED HEALTH CARE EDUCATION/TRAINING PROGRAM

## 2022-08-29 PROCEDURE — 99232 SBSQ HOSP IP/OBS MODERATE 35: CPT | Performed by: STUDENT IN AN ORGANIZED HEALTH CARE EDUCATION/TRAINING PROGRAM

## 2022-08-29 PROCEDURE — 97116 GAIT TRAINING THERAPY: CPT

## 2022-08-29 PROCEDURE — A9579 GAD-BASE MR CONTRAST NOS,1ML: HCPCS | Performed by: STUDENT IN AN ORGANIZED HEALTH CARE EDUCATION/TRAINING PROGRAM

## 2022-08-29 PROCEDURE — 6360000002 HC RX W HCPCS: Performed by: STUDENT IN AN ORGANIZED HEALTH CARE EDUCATION/TRAINING PROGRAM

## 2022-08-29 PROCEDURE — 1210000000 HC MED SURG R&B

## 2022-08-29 PROCEDURE — 2580000003 HC RX 258: Performed by: STUDENT IN AN ORGANIZED HEALTH CARE EDUCATION/TRAINING PROGRAM

## 2022-08-29 PROCEDURE — 97166 OT EVAL MOD COMPLEX 45 MIN: CPT

## 2022-08-29 PROCEDURE — 6360000004 HC RX CONTRAST MEDICATION: Performed by: STUDENT IN AN ORGANIZED HEALTH CARE EDUCATION/TRAINING PROGRAM

## 2022-08-29 RX ORDER — SODIUM CHLORIDE 0.9 % (FLUSH) 0.9 %
10 SYRINGE (ML) INJECTION ONCE
Status: COMPLETED | OUTPATIENT
Start: 2022-08-29 | End: 2022-08-29

## 2022-08-29 RX ORDER — LIDOCAINE HYDROCHLORIDE 10 MG/ML
5 INJECTION, SOLUTION EPIDURAL; INFILTRATION; INTRACAUDAL; PERINEURAL ONCE
Status: DISCONTINUED | OUTPATIENT
Start: 2022-08-29 | End: 2022-08-31

## 2022-08-29 RX ORDER — SODIUM CHLORIDE 0.9 % (FLUSH) 0.9 %
5-40 SYRINGE (ML) INJECTION EVERY 12 HOURS SCHEDULED
Status: DISCONTINUED | OUTPATIENT
Start: 2022-08-29 | End: 2022-09-02 | Stop reason: HOSPADM

## 2022-08-29 RX ORDER — MORPHINE SULFATE 2 MG/ML
2 INJECTION, SOLUTION INTRAMUSCULAR; INTRAVENOUS
Status: DISCONTINUED | OUTPATIENT
Start: 2022-08-29 | End: 2022-08-30

## 2022-08-29 RX ORDER — SODIUM CHLORIDE 0.9 % (FLUSH) 0.9 %
5-40 SYRINGE (ML) INJECTION PRN
Status: DISCONTINUED | OUTPATIENT
Start: 2022-08-29 | End: 2022-09-02 | Stop reason: HOSPADM

## 2022-08-29 RX ORDER — 0.9 % SODIUM CHLORIDE 0.9 %
100 INTRAVENOUS SOLUTION INTRAVENOUS ONCE
Status: DISCONTINUED | OUTPATIENT
Start: 2022-08-29 | End: 2022-09-02 | Stop reason: HOSPADM

## 2022-08-29 RX ORDER — SODIUM CHLORIDE 9 MG/ML
25 INJECTION, SOLUTION INTRAVENOUS PRN
Status: DISCONTINUED | OUTPATIENT
Start: 2022-08-29 | End: 2022-09-02 | Stop reason: HOSPADM

## 2022-08-29 RX ORDER — MORPHINE SULFATE 2 MG/ML
2 INJECTION, SOLUTION INTRAMUSCULAR; INTRAVENOUS ONCE
Status: COMPLETED | OUTPATIENT
Start: 2022-08-29 | End: 2022-08-29

## 2022-08-29 RX ADMIN — GADOTERIDOL 11 ML: 279.3 INJECTION, SOLUTION INTRAVENOUS at 12:16

## 2022-08-29 RX ADMIN — Medication 100 ML: at 12:17

## 2022-08-29 RX ADMIN — MORPHINE SULFATE 2 MG: 2 INJECTION, SOLUTION INTRAMUSCULAR; INTRAVENOUS at 01:47

## 2022-08-29 RX ADMIN — NIFEDIPINE 60 MG: 30 TABLET, FILM COATED, EXTENDED RELEASE ORAL at 09:14

## 2022-08-29 RX ADMIN — MORPHINE SULFATE 2 MG: 2 INJECTION, SOLUTION INTRAMUSCULAR; INTRAVENOUS at 19:49

## 2022-08-29 RX ADMIN — MORPHINE SULFATE 2 MG: 2 INJECTION, SOLUTION INTRAMUSCULAR; INTRAVENOUS at 18:01

## 2022-08-29 RX ADMIN — MORPHINE SULFATE 2 MG: 2 INJECTION, SOLUTION INTRAMUSCULAR; INTRAVENOUS at 09:09

## 2022-08-29 RX ADMIN — HYDRALAZINE HYDROCHLORIDE 25 MG: 25 TABLET, FILM COATED ORAL at 14:36

## 2022-08-29 RX ADMIN — MORPHINE SULFATE 2 MG: 2 INJECTION, SOLUTION INTRAMUSCULAR; INTRAVENOUS at 23:31

## 2022-08-29 RX ADMIN — SODIUM CHLORIDE, PRESERVATIVE FREE 10 ML: 5 INJECTION INTRAVENOUS at 09:15

## 2022-08-29 RX ADMIN — MEROPENEM 1000 MG: 1 INJECTION, POWDER, FOR SOLUTION INTRAVENOUS at 14:35

## 2022-08-29 RX ADMIN — SODIUM CHLORIDE, PRESERVATIVE FREE 10 ML: 5 INJECTION INTRAVENOUS at 12:16

## 2022-08-29 RX ADMIN — MORPHINE SULFATE 2 MG: 2 INJECTION, SOLUTION INTRAMUSCULAR; INTRAVENOUS at 13:12

## 2022-08-29 RX ADMIN — HYDRALAZINE HYDROCHLORIDE 25 MG: 25 TABLET, FILM COATED ORAL at 05:55

## 2022-08-29 RX ADMIN — HYDRALAZINE HYDROCHLORIDE 25 MG: 25 TABLET, FILM COATED ORAL at 21:32

## 2022-08-29 RX ADMIN — MORPHINE SULFATE 2 MG: 2 INJECTION, SOLUTION INTRAMUSCULAR; INTRAVENOUS at 06:01

## 2022-08-29 ASSESSMENT — PAIN DESCRIPTION - FREQUENCY
FREQUENCY: CONTINUOUS

## 2022-08-29 ASSESSMENT — PAIN SCALES - GENERAL
PAINLEVEL_OUTOF10: 10
PAINLEVEL_OUTOF10: 7
PAINLEVEL_OUTOF10: 6
PAINLEVEL_OUTOF10: 4
PAINLEVEL_OUTOF10: 0
PAINLEVEL_OUTOF10: 4
PAINLEVEL_OUTOF10: 10
PAINLEVEL_OUTOF10: 0
PAINLEVEL_OUTOF10: 0

## 2022-08-29 ASSESSMENT — PAIN DESCRIPTION - LOCATION
LOCATION: ABDOMEN

## 2022-08-29 ASSESSMENT — PAIN - FUNCTIONAL ASSESSMENT
PAIN_FUNCTIONAL_ASSESSMENT: PREVENTS OR INTERFERES SOME ACTIVE ACTIVITIES AND ADLS

## 2022-08-29 ASSESSMENT — PAIN DESCRIPTION - ONSET
ONSET: ON-GOING

## 2022-08-29 ASSESSMENT — PAIN DESCRIPTION - ORIENTATION
ORIENTATION: MID

## 2022-08-29 ASSESSMENT — PAIN DESCRIPTION - DESCRIPTORS
DESCRIPTORS: ACHING;SORE

## 2022-08-29 ASSESSMENT — PAIN DESCRIPTION - PAIN TYPE
TYPE: ACUTE PAIN

## 2022-08-29 NOTE — PROGRESS NOTES
[facility]  Office: [address], DO, Florencio Anderson, DO, Bryant Gonzalez, DO, Sayra Conenll Blood, DO, Flaquita Paz MD, Shayla Titus MD, Chadd Royal MD, Carissa Kiran MD,  Matthew Penn MD, Angy Martinez MD, Timbo Orozco, DO, Asia Euceda MD,  Stephan Connell MD, Juana Mcallister MD, Karlene Velasquez, DO, Mary De Anda MD, Vinay Jones MD, Noel Sullivan MD, Magali Lellor, DO, Derek Esquivel MD, Kartik Jung MD, Hernesto Jose, CNP,  Kassi Waller, CNP, Jolly Adame, CNP, Meghan Ledezma, CNP, Toby Matute PADuaneC, Gabrielle Goldberg, DNP, Deb Archer, CNP, Deshaun Stark, CNP, Greg Hinds, CNP, Ron Blackburn, CNP, Keshia Cancer, CNP, Edilberto Conner, CNS, Clint Segura, DNP, Malvin Max, CNP, Jose Hyatt, CNP, Mikala Petit, CNP           [facility]    Progress Note    8/29/2022    8:51 AM    Name:   Marbella Andrews  MRN:     [id number]     Acct:      [de-identified]   Room:   [address] [place] [address] Day:  1  Admit Date:  8/28/2022  3:48 PM    PCP:   uJliet Whitehead MD  Code Status:  DNR-CC    Subjective:     C/C:   Chief Complaint   Patient presents with    Abdominal Pain    Drug Overdose     Interval History Status: improved. Patient was seen and examined at bedside this AM. She reports feeling better and states that her abdominal pain is improving. Mental status is improved this morning and she is alert and oriented to person and place. [facility] Brief History:     Marbella Andrews is a 79 y.o. female with a past medical history of dementia who presented to the emergency department on 8/28/2022 complaining of abdominal pain, nausea/vomiting and anorexia. The patient's  states that her symptoms began several days ago and have progressively worsened. He states that she has had very little to eat or drink over the past week.  In the ED, the patient was afebrile and nontoxic appearing but Alzheimer's Disease Father 67    Heart Attack Brother 62    Heart Disease Brother     Heart Disease Brother        Vitals:  /76   Pulse (!) 123   Temp 97.3 °F (36.3 °C) (Oral)   Resp 14   Ht 5' 1\" (1.549 m)   Wt 120 lb 13 oz (54.8 kg)   SpO2 96%   BMI 22.83 kg/m²   Temp (24hrs), Av.9 °F (36.6 °C), Min:97.3 °F (36.3 °C), Max:98.8 °F (37.1 °C)    No results for input(s): POCGLU in the last 72 hours. I/O (24Hr): Intake/Output Summary (Last 24 hours) at 2022 0851  Last data filed at 2022 0602  Gross per 24 hour   Intake 2450.05 ml   Output 400 ml   Net 2050.05 ml       Labs:  Hematology:  Recent Labs     22  1632 22  0733   WBC 11.6* 13.1*   RBC 5.10 4.83   HGB 15.8 15.0   HCT 46.9* 44.2   MCV 92.0 91.6   MCH 30.9 31.1   MCHC 33.6 34.0   RDW 15.0 14.9    176   MPV 9.1 9.0     Chemistry:  Recent Labs     22  1632 22  0131   * 123*   K 3.3*  --    CL 84*  --    CO2 17*  --    GLUCOSE 159*  --    BUN 24*  --    CREATININE 0.93*  --    MG 1.4*  --    ANIONGAP 20*  --    LABGLOM 58*  --    GFRAA >60  --    CALCIUM 10.3  --    PROBNP 1,774*  --    TROPHS 26*  --      Recent Labs     22  1632   PROT 7.1   LABALBU 3.4*   AST 42*   ALT 33   ALKPHOS 118*   BILITOT 0.38   LIPASE 1,349*     ABG:No results found for: POCPH, PHART, PH, POCPCO2, FPH3RTJ, PCO2, POCPO2, PO2ART, PO2, POCHCO3, ZED6KMZ, HCO3, NBEA, PBEA, BEART, BE, THGBART, THB, RSF7YBY, HVNQ4HRF, S3PGHXBB, O2SAT, FIO2  Lab Results   Component Value Date/Time    SPECIAL 6ML LEFT HAND 2021 07:25 PM     Lab Results   Component Value Date/Time    CULTURE NO GROWTH 6 DAYS 2021 07:25 PM       Radiology:  XR CHEST PORTABLE    Result Date: 2022  Possible mild pulmonary vascular congestion     CTA ABDOMEN PELVIS W CONTRAST    Result Date: 2022  1. Findings of acute uncomplicated pancreatitis with secondary reactive edematous wall thickening of the duodenum.  2. Small amount of reactive free fluid throughout the peritoneal cavity. No evidence of abscess. 3. Mild-to-moderate atherosclerotic disease as described above. No aortic aneurysm or dissection. Physical Examination:     General appearance:  alert, cooperative and no distress  Mental Status:  Oriented to person and place. Lungs:  clear to auscultation bilaterally, normal effort  Heart:  regular rate and rhythm, no murmur  Abdomen:  Tender to palpation.    Extremities:  no edema, redness, tenderness in the calves  Skin:  no gross lesions, rashes, induration    Assessment:     Hospital Problems             Last Modified POA    * (Principal) Acute pancreatitis without necrosis or infection, unspecified 8/28/2022 Yes    Hyperlipidemia 8/28/2022 Yes    Hyponatremia 8/28/2022 Yes    Dementia ([facility] [address] 8/28/2022 Yes       Plan:     Acute pancreatitis   -CT abdomen and pelvis showing acute uncomplicated pancreatitis with secondary reactive edematous wall thickening of the duodenum   -Likely secondary to gallstones   -MRCP pending   -Maintenance fluids at 100 mL/hr   -Morphine pain panel   -Daily CBC   -Daily CMP      Hyponatremia   -Improving with fluids   -Likely secondary to volume depletion from intractable nausea/abdominal pain   -Maintenance fluids as above   -Sodium q6h      Elevated lactic acid  -Trending down   -Likely secondary to acute pancreatitis/duodenitis   -Fluids as above   -Continue to trend      Hypertension   -Continue tid hydralazine   -Likely secondary to pain   -Morphine pain panel as above      Dementia   -Patient has a history of dementia and is alert and oriented only to self on my exam ( states that her mentation waxes and wanes)   -Code status has been changed to [facility] per 's request     Lieutenant Ja MD  8/29/2022  8:51 AM

## 2022-08-29 NOTE — PLAN OF CARE
Problem: Discharge Planning  Goal: Discharge to home or other facility with appropriate resources  Outcome: Progressing  Flowsheets (Taken 8/28/2022 2226)  Discharge to home or other facility with appropriate resources:   Identify barriers to discharge with patient and caregiver   Arrange for needed discharge resources and transportation as appropriate   Identify discharge learning needs (meds, wound care, etc)   Refer to discharge planning if patient needs post-hospital services based on physician order or complex needs related to functional status, cognitive ability or social support system     Problem: Pain  Goal: Verbalizes/displays adequate comfort level or baseline comfort level  Outcome: Progressing   PRN pain medication ordered (See Mar). Problem: Safety - Adult  Goal: Free from fall injury  Outcome: Progressing   The patient remained free from falls this shift, call light within reach, bed in locked and lowest position. Side rails up x2. Continue to monitor closely.      Problem: ABCDS Injury Assessment  Goal: Absence of physical injury  Outcome: Progressing

## 2022-08-29 NOTE — PROGRESS NOTES
Occupational Therapy  Facility/Department: [facility] [address] [place] [address] ICU  Occupational Therapy Initial Assessment    Name: Karne Villar  : 1940  MRN: [id number]  Date of Service: 2022    Chief Complaint   Patient presents with    Abdominal Pain    Drug Overdose     Discharge Recommendations:  Patient would benefit from continued therapy after discharge        Patient Diagnosis(es): The encounter diagnosis was Acute pancreatitis, unspecified complication status, unspecified pancreatitis type. Past Medical History:  has a past medical history of Accelerated hypertension, Atypical chest pain, Hyperlipidemia, Hypertension, and Labile blood pressure. Past Surgical History:  has a past surgical history that includes Hysterectomy; Abdomen surgery; LIGATION OF SAPHENOUS VEIN; and bladder suspension. Assessment   Performance deficits / Impairments: Decreased functional mobility ; Decreased ADL status; Decreased safe awareness;Decreased balance;Decreased high-level IADLs;Decreased endurance;Decreased cognition;Decreased strength;Decreased coordination  Assessment: Pt currently limited in performing ADL tasks and functional transfers/functional mobility due to above noted deficits, most significantly decreased activity tolerance and decreased cognition. Pt to benefit from continued therapy services while hospitalized and at discharge to maximize pt's safety and independence in performing functional tasks. Pt to require 24hr assistance and continued therapy services at discharge. Prognosis: Good  Decision Making: Medium Complexity  REQUIRES OT FOLLOW-UP: Yes  Activity Tolerance  Activity Tolerance: Treatment limited secondary to decreased cognition;Patient limited by pain      Safety Devices  Type of Devices: Call light within reach;Gait belt;Left in chair;Nurse notified; Chair alarm in place  Restraints  Restraints Initially in Place: No    Plan   Plan  Times per Week: 5-6x/wk  Times per Day: Daily  Current Treatment Recommendations: Strengthening, Balance training, Functional mobility training, Patient/Caregiver education & training, Self-Care / ADL, Home management training, Equipment evaluation, education, & procurement, Safety education & training, Endurance training     Restrictions  Restrictions/Precautions  Restrictions/Precautions: Fall Risk  Required Braces or Orthoses?: No  Position Activity Restriction  Other position/activity restrictions: Up with assistance    Subjective   General  Patient assessed for rehabilitation services?: Yes  Family / Caregiver Present: Yes ( arrived following start of session and remained throughout)  General Comment  Comments: RN ok'd for therapy this AM. Pt agreeable to participate in session, pt with confusion throughout however is pleasant/cooperative. Pt with consistent complaints of mild to moderate stomach pain throughout session. Social/Functional History  Social/Functional History  Lives With: Spouse  Type of Home: House  Home Layout: Two level, Bed/Bath upstairs, 1/2 bath on main level (Full finished basement. Pt notes she is able to negotiate a flight of stairs with one rail for full bed/bath.)  Home Access: Stairs to enter without rails  Entrance Stairs - Number of Steps: 2  Bathroom Shower/Tub: Walk-in shower  Bathroom Toilet: Standard  Bathroom Equipment:  (No DME)  Bathroom Accessibility: Accessible  Home Equipment:  (No DME)  Receives Help From: Family (supportive )  ADL Assistance: Independent (Per pt and confirmed by family)  Homemaking Assistance: Needs assistance  Homemaking Responsibilities: No ( has been performing primarily with pt assisting only as able to tolerate.)  Health Care Management: Primary (Pt's  notes pt has been previously performing own medication management and notes she has had great difficulty with such (notes including taking wrong medications).   plans to take over at d/c.)  Ambulation Assistance: Independent (without device)  Transfer Assistance: Independent  Active : No  Patient's  Info:  drives  Occupation: Retired  Leisure & Hobbies: Enjoys spending time with her   Additional Comments: Pt reports he is able to provide 24/7 assistance at discharge and able to assist her as needed. Objective   Vision  Vision: Impaired  Vision Exceptions: Wears glasses at all times  Hearing  Hearing: Within functional limits    Balance  Sitting: Impaired (seated EOB ~6 minutes, seated on toilet ~2-3 minutes)  Sitting - Static: Stand by assistance   Sitting - Dynamic: Contact guard assistance  Standing: Impaired (~2-3 minute bouts of standing/mobility prior to requiring rest breaks)  Standing - Static: Minimal assistance  Standing - Dynamic: Minimal assistance    Functional Mobility   Overall Level of Assistance: Minimum assistance  Interventions: Safety awareness training; Tactile cues; Verbal cues  Assistive Device:  (Attempted with hand held assistance bilaterally and use of RW)  Comments: Mildly unsteady however no true LOB; forward flexed trunk with reports of stomach pain; increased time/effort as pt with slow pace and report of fatigue and increased pain following minimal exertion. Attempted with bilateral hand held assistance and with use of RW, pt requires mod VCs with both due to decreased cognition/safety awareness. Toilet Transfers  Toilet - Technique: Ambulating  Equipment Used: Standard toilet (with use of grab bars)  Toilet Transfer: Minimal assistance    AROM: Within functional limits (BUE)  Strength: Generally decreased, functional (BUE- observed functionally, not formally assessed)  Coordination: Generally decreased, functional (BUE FMC/GMC)    ADL  Feeding: Setup; Increased time to complete  Grooming: Increased time to complete;Setup;Verbal cueing;Contact guard assistance  UE Bathing: Increased time to complete;Setup;Verbal cueing;Minimal assistance  LE Bathing: Increased time to complete;Setup;Verbal cueing; Moderate assistance  UE Dressing: Increased time to complete;Setup;Verbal cueing;Minimal assistance  LE Dressing: Increased time to complete;Setup;Verbal cueing; Moderate assistance  Toileting: Increased time to complete;Setup;Minimal assistance       Bed mobility  Supine to Sit: Moderate assistance (mod Ax1-2; cues for sequencing)  Sit to Supine:  (retired to chair at end of session)  Scooting: Moderate assistance    Transfers  Sit to stand: Minimal assistance  Stand to sit: Minimal assistance  Transfer Comments: Mod verbal/tactile cues for initiation/sequencing/safety awareness; increased time/effort to perform      Cognition  Overall Cognitive Status: Exceptions  Following Commands: Follows one step commands with increased time; Follows multistep commands with increased time; Follows multistep commands with repitition  Attention Span: Difficulty dividing attention  Safety Judgement: Decreased awareness of need for assistance;Decreased awareness of need for safety  Problem Solving: Assistance required to identify errors made;Assistance required to generate solutions  Insights: Decreased awareness of deficits  Initiation: Requires cues for all  Sequencing: Requires cues for all  Cognition Comment: Pt with noted baseline dementia. Orientation  Overall Orientation Status: Impaired  Orientation Level: Oriented to place; Disoriented to person;Disoriented to time;Disoriented to situation        Education Given To: Patient  Education Provided: Plan of Care;Equipment;Transfer Training;Energy Conservation;Role of Therapy; Fall Prevention Strategies;Precautions; ADL Adaptive Strategies  Education Method: Demonstration;Verbal  Barriers to Learning: Cognition  Education Outcome: Verbalized understanding;Continued education needed    AM-PAC Score   AM-PAC Inpatient Daily Activity Raw Score: 16 (08/29/22 1405)  AM-PAC Inpatient ADL T-Scale Score : 35.96 (08/29/22 1405)  ADL Inpatient CMS 0-100%

## 2022-08-29 NOTE — PROGRESS NOTES
Pharmacy Note - Extended Infusion Beta-Lactam Adjustment    Meropenem 1g Q8h for treatment of Intra-abdominal Infection. Per [facility] Extended Infusion Beta-Lactam Policy, meropenem will be changed to 1000mg load followed by 1000mg Q12h extended infusion for CrCl 26-49mL/min. Estimated Creatinine Clearance: Estimated Creatinine Clearance: 35 mL/min (A) (based on SCr of 0.93 mg/dL (H)). BMI: Body mass index is 22.83 kg/m². Please call with any questions.     Thank you,    Unknown Sandifer, [facility]

## 2022-08-29 NOTE — PROGRESS NOTES
Patient arrived to [facility] ICU unit room 346 viz wheelchair. Vitals taken and admission hx started. Patient oriented to room and instructed on use  call light. Bed locked and in lowest position, bedside table and call light within reach, and side rails up x2.

## 2022-08-29 NOTE — PROGRESS NOTES
Physical Therapy  Facility/Department: [facility] ICU  Physical Therapy Initial Assessment    Name: Lui Griggs  : 1940  MRN: [id number]  Date of Service: 2022  Chief Complaint   Patient presents with    Abdominal Pain    Drug Overdose       Discharge Recommendations:  Patient would benefit from continued therapy after discharge   PT Equipment Recommendations  Equipment Needed: Yes  Mobility Devices: Mario Rear: Rolling  Other: Continue to assess but likely will benefit from pending overall pain control at d/c. Patient Diagnosis(es): The encounter diagnosis was Acute pancreatitis, unspecified complication status, unspecified pancreatitis type. Past Medical History:  has a past medical history of Accelerated hypertension, Atypical chest pain, Hyperlipidemia, Hypertension, and Labile blood pressure. Past Surgical History:  has a past surgical history that includes Hysterectomy; Abdomen surgery; LIGATION OF SAPHENOUS VEIN; and bladder suspension. Assessment   Body Structures, Functions, Activity Limitations Requiring Skilled Therapeutic Intervention: Decreased functional mobility ; Decreased strength;Decreased safe awareness;Decreased cognition;Decreased endurance;Decreased balance;Decreased posture; Increased pain  Assessment: Pt with impaired mobility requiring mostly min Ax1 for transfers and gait but up to mod A for bed mobility. Pt typcially ambulates independently without device but with noted decreased tolerance likely related to abdominal pain this date. Pt currently would be unsafe to return to prior living arrangements without strict 24/ assistance and will likely need equipment (RW) and additional therapy but will continue to assess as pt with improvement in pain control.   Therapy Prognosis: Good  Decision Making: Medium Complexity  Requires PT Follow-Up: Yes  Activity Tolerance  Activity Tolerance: Patient limited by fatigue;Patient limited by pain;Treatment limited ( has been performing primarily with pt assisting only as able to tolerate.)  Health Care Management: Primary (Pt's  notes pt has been previously performing own medication management and notes she has had great difficulty with such (notes including taking wrong medications).  plans to take over at d/c.)  Ambulation Assistance: Independent (without device)  Transfer Assistance: Independent  Active : No  Patient's  Info:  drives  Occupation: Retired  Leisure & Hobbies: Enjoys spending time with her   Additional Comments: Pt reports he is able to provide 24/7 assistance at discharge and able to assist her as needed. Vision/Hearing  Vision  Vision: Impaired  Vision Exceptions: Wears glasses at all times  Hearing  Hearing: Within functional limits    Cognition   Orientation  Overall Orientation Status: Impaired  Orientation Level: Oriented to place; Disoriented to person;Disoriented to time;Disoriented to situation  Cognition  Overall Cognitive Status: Exceptions  Following Commands: Follows one step commands with increased time; Follows multistep commands with increased time; Follows multistep commands with repitition  Attention Span: Difficulty dividing attention  Safety Judgement: Decreased awareness of need for assistance;Decreased awareness of need for safety  Problem Solving: Assistance required to identify errors made;Assistance required to generate solutions  Insights: Decreased awareness of deficits  Initiation: Requires cues for all  Sequencing: Requires cues for all  Cognition Comment: Pt with noted baseline dementia.      Objective     AROM RLE (degrees)  RLE AROM: WFL  AROM LLE (degrees)  LLE AROM : WFL  AROM RUE (degrees)  RUE AROM : WFL  AROM LUE (degrees)  LUE AROM : WFL  Strength RLE  Strength RLE: WFL  Comment: demonstrated with mobility; not formally assessed  Strength LLE  Strength LLE: WFL  Comment: demonstrated with mobility; not formally assessed  Strength RUE  Comment: Co evaluation with OT-see OT evaluation for full UE assessment  Strength LUKANDY  Comment: Co evaluation with OT-see OT evaluation for full UE assessment           Bed mobility  Supine to Sit: Moderate assistance (mod Ax1-2; cues for sequencing)  Sit to Supine:  (retired to chair at end of session)  Scooting:  Moderate assistance  Transfers  Sit to Stand: Minimal Assistance  Stand to sit: Minimal Assistance  Comment: cues for sequencing, cues for hand placement  Ambulation  Surface: level tile  Device: No Device;Hand-Held Assist  Assistance: Minimal assistance  Quality of Gait: mild unsteady, forward flexed, cues for sequencing, increased complaints of pain  Gait Deviations: Slow Allyssa;Decreased step length;Decreased step height;Shuffles  Distance: 8ft  More Ambulation?: Yes  Ambulation 2  Surface - 2: level tile  Device 2: Rolling Walker  Assistance 2: Minimal assistance  Quality of Gait 2: min A for walker guidance, CGA for balance, cues for sequencing, forward flexed  Gait Deviations: Slow Allyssa;Decreased step length;Decreased step height;Shuffles  Distance: 15ft  Stairs/Curb  Stairs?: No     Balance  Posture: Fair  Sitting - Static: Fair  Sitting - Dynamic: Fair;-  Standing - Static: Fair  Standing - Dynamic: Fair  Comments: standing balance assessed without device and with RW           AM-PAC Score  AM-PAC Inpatient Mobility Raw Score : 16 (08/29/22 1206)  AM-PAC Inpatient T-Scale Score : 40.78 (08/29/22 1206)  Mobility Inpatient CMS 0-100% Score: 54.16 (08/29/22 1206)  Mobility Inpatient CMS G-Code Modifier : CK (08/29/22 1206)          Goals  Short Term Goals  Time Frame for Short term goals: 14 visits  Short term goal 1: Pt to ambulate 300ft modified independently with least restrictive device  Short term goal 2: Pt to sit <> stand transfer independently  Short term goal 3: Pt to demonstrate independent bed mobility  Short term goal 4: Pt to demonstrate good - standing balance to decrease risk of falls  Short term goal 5: Pt to ascend/descend flight of stairs with one rail modified independently       Education  Patient Education  Education Given To: Patient;Caregiver  Education Provided: Role of Therapy;Plan of Care;Transfer Training  Education Provided Comments: importance of mobility, use of RW  Education Method: Verbal  Barriers to Learning: None;Cognition (cognition for pt; none for )  Education Outcome: Verbalized understanding      Therapy Time   Individual Concurrent Group Co-treatment   Time In 1003         Time Out 1039         Minutes 36         Timed Code Treatment Minutes: [address],

## 2022-08-30 LAB
ABSOLUTE EOS #: 0 K/UL (ref 0–0.4)
ABSOLUTE LYMPH #: 0.27 K/UL (ref 1–4.8)
ABSOLUTE MONO #: 1.2 K/UL (ref 0.1–0.8)
ALBUMIN SERPL-MCNC: 2.4 G/DL (ref 3.5–5.2)
ALBUMIN/GLOBULIN RATIO: 0.9 (ref 1–2.5)
ALP BLD-CCNC: 157 U/L (ref 35–104)
ALT SERPL-CCNC: 23 U/L (ref 5–33)
ANION GAP SERPL CALCULATED.3IONS-SCNC: 14 MMOL/L (ref 9–17)
AST SERPL-CCNC: 67 U/L
BASOPHILS # BLD: 0 % (ref 0–2)
BASOPHILS ABSOLUTE: 0 K/UL (ref 0–0.2)
BILIRUB SERPL-MCNC: 0.69 MG/DL (ref 0.3–1.2)
BUN BLDV-MCNC: 35 MG/DL (ref 8–23)
CALCIUM SERPL-MCNC: 8.7 MG/DL (ref 8.6–10.4)
CHLORIDE BLD-SCNC: 100 MMOL/L (ref 98–107)
CO2: 16 MMOL/L (ref 20–31)
CREAT SERPL-MCNC: 0.86 MG/DL (ref 0.5–0.9)
EOSINOPHILS RELATIVE PERCENT: 0 % (ref 1–4)
GFR AFRICAN AMERICAN: >60 ML/MIN
GFR NON-AFRICAN AMERICAN: >60 ML/MIN
GFR SERPL CREATININE-BSD FRML MDRD: ABNORMAL ML/MIN/{1.73_M2}
GLUCOSE BLD-MCNC: 118 MG/DL (ref 70–99)
HCT VFR BLD CALC: 42.3 % (ref 36–46)
HEMOGLOBIN: 14.9 G/DL (ref 12–16)
LIPASE: 461 U/L (ref 13–60)
LYMPHOCYTES # BLD: 2 % (ref 24–44)
MCH RBC QN AUTO: 30.7 PG (ref 26–34)
MCHC RBC AUTO-ENTMCNC: 35.2 G/DL (ref 31–37)
MCV RBC AUTO: 87.2 FL (ref 80–100)
MONOCYTES # BLD: 9 % (ref 1–7)
MORPHOLOGY: ABNORMAL
NUCLEATED RED BLOOD CELLS: 1 PER 100 WBC
PDW BLD-RTO: 15.9 % (ref 12.5–15.4)
PLATELET # BLD: 163 K/UL (ref 140–450)
PMV BLD AUTO: 11.3 FL (ref 8–14)
POTASSIUM SERPL-SCNC: 3.8 MMOL/L (ref 3.7–5.3)
POTASSIUM SERPL-SCNC: 5.4 MMOL/L (ref 3.7–5.3)
RBC # BLD: 4.85 M/UL (ref 4–5.2)
SEG NEUTROPHILS: 89 % (ref 36–66)
SEGMENTED NEUTROPHILS ABSOLUTE COUNT: 11.83 K/UL (ref 1.8–7.7)
SODIUM BLD-SCNC: 128 MMOL/L (ref 135–144)
SODIUM BLD-SCNC: 130 MMOL/L (ref 135–144)
TOTAL PROTEIN: 5.2 G/DL (ref 6.4–8.3)
WBC # BLD: 13.3 K/UL (ref 3.5–11)

## 2022-08-30 PROCEDURE — 97116 GAIT TRAINING THERAPY: CPT

## 2022-08-30 PROCEDURE — 51798 US URINE CAPACITY MEASURE: CPT

## 2022-08-30 PROCEDURE — 99232 SBSQ HOSP IP/OBS MODERATE 35: CPT | Performed by: HOSPITALIST

## 2022-08-30 PROCEDURE — 6360000002 HC RX W HCPCS: Performed by: NURSE PRACTITIONER

## 2022-08-30 PROCEDURE — 1210000000 HC MED SURG R&B

## 2022-08-30 PROCEDURE — 36415 COLL VENOUS BLD VENIPUNCTURE: CPT

## 2022-08-30 PROCEDURE — 83690 ASSAY OF LIPASE: CPT

## 2022-08-30 PROCEDURE — 97535 SELF CARE MNGMENT TRAINING: CPT

## 2022-08-30 PROCEDURE — 6360000002 HC RX W HCPCS: Performed by: STUDENT IN AN ORGANIZED HEALTH CARE EDUCATION/TRAINING PROGRAM

## 2022-08-30 PROCEDURE — 2580000003 HC RX 258: Performed by: STUDENT IN AN ORGANIZED HEALTH CARE EDUCATION/TRAINING PROGRAM

## 2022-08-30 PROCEDURE — 84295 ASSAY OF SERUM SODIUM: CPT

## 2022-08-30 PROCEDURE — 80053 COMPREHEN METABOLIC PANEL: CPT

## 2022-08-30 PROCEDURE — 97162 PT EVAL MOD COMPLEX 30 MIN: CPT

## 2022-08-30 PROCEDURE — 6370000000 HC RX 637 (ALT 250 FOR IP): Performed by: STUDENT IN AN ORGANIZED HEALTH CARE EDUCATION/TRAINING PROGRAM

## 2022-08-30 PROCEDURE — 85025 COMPLETE CBC W/AUTO DIFF WBC: CPT

## 2022-08-30 PROCEDURE — 6360000002 HC RX W HCPCS: Performed by: HOSPITALIST

## 2022-08-30 PROCEDURE — 84132 ASSAY OF SERUM POTASSIUM: CPT

## 2022-08-30 RX ORDER — MORPHINE SULFATE 4 MG/ML
4 INJECTION, SOLUTION INTRAMUSCULAR; INTRAVENOUS
Status: DISCONTINUED | OUTPATIENT
Start: 2022-08-30 | End: 2022-08-31

## 2022-08-30 RX ORDER — MORPHINE SULFATE 2 MG/ML
2 INJECTION, SOLUTION INTRAMUSCULAR; INTRAVENOUS
Status: DISCONTINUED | OUTPATIENT
Start: 2022-08-30 | End: 2022-08-31

## 2022-08-30 RX ADMIN — ONDANSETRON 4 MG: 2 INJECTION INTRAMUSCULAR; INTRAVENOUS at 16:01

## 2022-08-30 RX ADMIN — NIFEDIPINE 60 MG: 30 TABLET, FILM COATED, EXTENDED RELEASE ORAL at 10:28

## 2022-08-30 RX ADMIN — MORPHINE SULFATE 2 MG: 2 INJECTION, SOLUTION INTRAMUSCULAR; INTRAVENOUS at 12:47

## 2022-08-30 RX ADMIN — MEROPENEM 1000 MG: 1 INJECTION, POWDER, FOR SOLUTION INTRAVENOUS at 02:38

## 2022-08-30 RX ADMIN — MORPHINE SULFATE 2 MG: 2 INJECTION, SOLUTION INTRAMUSCULAR; INTRAVENOUS at 15:38

## 2022-08-30 RX ADMIN — HYDRALAZINE HYDROCHLORIDE 25 MG: 25 TABLET, FILM COATED ORAL at 15:38

## 2022-08-30 RX ADMIN — HYDRALAZINE HYDROCHLORIDE 25 MG: 25 TABLET, FILM COATED ORAL at 05:44

## 2022-08-30 RX ADMIN — MEROPENEM 1000 MG: 1 INJECTION, POWDER, FOR SOLUTION INTRAVENOUS at 16:06

## 2022-08-30 RX ADMIN — MORPHINE SULFATE 4 MG: 4 INJECTION INTRAVENOUS at 18:14

## 2022-08-30 RX ADMIN — MORPHINE SULFATE 2 MG: 2 INJECTION, SOLUTION INTRAMUSCULAR; INTRAVENOUS at 02:35

## 2022-08-30 RX ADMIN — MORPHINE SULFATE 2 MG: 2 INJECTION, SOLUTION INTRAMUSCULAR; INTRAVENOUS at 05:45

## 2022-08-30 RX ADMIN — SODIUM CHLORIDE, PRESERVATIVE FREE 10 ML: 5 INJECTION INTRAVENOUS at 20:31

## 2022-08-30 RX ADMIN — MORPHINE SULFATE 2 MG: 2 INJECTION, SOLUTION INTRAMUSCULAR; INTRAVENOUS at 10:39

## 2022-08-30 RX ADMIN — MORPHINE SULFATE 4 MG: 4 INJECTION INTRAVENOUS at 20:23

## 2022-08-30 RX ADMIN — SODIUM CHLORIDE, PRESERVATIVE FREE 10 ML: 5 INJECTION INTRAVENOUS at 20:24

## 2022-08-30 RX ADMIN — SODIUM CHLORIDE 125 ML/HR: 9 INJECTION, SOLUTION INTRAVENOUS at 05:50

## 2022-08-30 ASSESSMENT — PAIN SCALES - PAIN ASSESSMENT IN ADVANCED DEMENTIA (PAINAD)
BODYLANGUAGE: 0
BREATHING: 1
FACIALEXPRESSION: 2
NEGVOCALIZATION: 1
CONSOLABILITY: 1
TOTALSCORE: 5

## 2022-08-30 ASSESSMENT — PAIN DESCRIPTION - LOCATION
LOCATION: ABDOMEN

## 2022-08-30 ASSESSMENT — PAIN SCALES - GENERAL
PAINLEVEL_OUTOF10: 8
PAINLEVEL_OUTOF10: 10
PAINLEVEL_OUTOF10: 10
PAINLEVEL_OUTOF10: 6
PAINLEVEL_OUTOF10: 6
PAINLEVEL_OUTOF10: 0

## 2022-08-30 ASSESSMENT — PAIN DESCRIPTION - FREQUENCY: FREQUENCY: CONTINUOUS

## 2022-08-30 ASSESSMENT — PAIN DESCRIPTION - PAIN TYPE: TYPE: ACUTE PAIN

## 2022-08-30 ASSESSMENT — PAIN DESCRIPTION - ONSET: ONSET: ON-GOING

## 2022-08-30 ASSESSMENT — PAIN DESCRIPTION - ORIENTATION: ORIENTATION: MID

## 2022-08-30 ASSESSMENT — PAIN DESCRIPTION - DESCRIPTORS: DESCRIPTORS: ACHING;SORE

## 2022-08-30 ASSESSMENT — PAIN - FUNCTIONAL ASSESSMENT: PAIN_FUNCTIONAL_ASSESSMENT: PREVENTS OR INTERFERES SOME ACTIVE ACTIVITIES AND ADLS

## 2022-08-30 NOTE — PROGRESS NOTES
anticipated course. They do understand that it will take weeks for her pain to improve. She will require a few more days of IV antibiotics. They appreciate all the information and denies any questions or concerns at this point in time. Brief History: This very pleasant 79-year-old female presenting the hospital with abdominal pain, nausea, vomiting, anorexia. 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 At this point in time family wish to treat the patient conservatively second assertively secondary to her dementia and [facility] status. She has been placed on meropenem. Pain is presently under control. Review of Systems:     Constitutional:  negative for chills, fevers, sweats  Respiratory:  negative for cough, dyspnea on exertion, shortness of breath, wheezing  Cardiovascular:  negative for chest pain, chest pressure/discomfort, lower extremity edema, palpitations  Gastrointestinal:  negative for abdominal pain, constipation, diarrhea, nausea, vomiting  Neurological:  negative for dizziness, headache    Medications: Allergies:     Allergies   Allergen Reactions    Doxycycline Other (See Comments)     Other reaction(s): diarrhea.stomach pain      Azithromycin Other (See Comments)    Ciprofloxacin Other (See Comments)       Current Meds:   Scheduled Meds:    sodium chloride  100 mL IntraVENous Once    meropenem  1,000 mg IntraVENous Q12H    HYDROmorphone  0.5 mg IntraVENous Once    lidocaine 1 % injection  5 mL IntraDERmal Once    sodium chloride flush  5-40 mL IntraVENous 2 times per day    sodium chloride  80 mL IntraVENous Once    sodium chloride flush  5-40 mL IntraVENous 2 times per day    enoxaparin  40 mg SubCUTAneous Daily    hydrALAZINE  25 mg Oral 3 times per day    NIFEdipine  60 mg Oral Daily     Continuous Infusions:    sodium chloride      sodium chloride      sodium chloride 125 mL/hr (08/30/22 4550)     PRN Meds: morphine **OR** morphine, sodium chloride flush, sodium chloride, sodium chloride flush, sodium chloride flush, sodium chloride, ondansetron **OR** ondansetron, polyethylene glycol, acetaminophen **OR** acetaminophen, potassium chloride **OR** potassium alternative oral replacement **OR** potassium chloride, magnesium sulfate    Data:     Past Medical History:   has a past medical history of Accelerated hypertension, Atypical chest pain, Hyperlipidemia, Hypertension, and Labile blood pressure. Social History:   reports that she has quit smoking. She has never used smokeless tobacco. She reports that she does not currently use alcohol. She reports that she does not use drugs. Family History:   Family History   Problem Relation Age of Onset    Heart Disease Mother 78    Alzheimer's Disease Father 67    Heart Attack Brother 62    Heart Disease Brother     Heart Disease Brother        Vitals:  /67   Pulse (!) 118   Temp 97.3 °F (36.3 °C) (Oral)   Resp 16   Ht 5' 1\" (1.549 m)   Wt 120 lb 5.9 oz (54.6 kg)   SpO2 96%   BMI 22.74 kg/m²   Temp (24hrs), Av.8 °F (36.6 °C), Min:97.3 °F (36.3 °C), Max:98.4 °F (36.9 °C)    No results for input(s): POCGLU in the last 72 hours. I/O (24Hr):     Intake/Output Summary (Last 24 hours) at 2022 1205  Last data filed at 2022 0549  Gross per 24 hour   Intake --   Output 350 ml   Net -350 ml       Labs:  Hematology:  Recent Labs     22  1632 22  0733 22  0904   WBC 11.6* 13.1* 13.3*   RBC 5.10 4.83 4.85   HGB 15.8 15.0 14.9   HCT 46.9* 44.2 42.3   MCV 92.0 91.6 87.2   MCH 30.9 31.1 30.7   MCHC 33.6 34.0 35.2   RDW 15.0 14.9 15.9*    176 163   MPV 9.1 9.0 11.3     Chemistry:  Recent Labs     22  1632 22  01322  0232 22  0904   *   < > 127* 128* 130*   K 3.3*  --   --   --  5.4*   CL 84*  --   --   --  100   CO2 17*  --   --   --  16*   GLUCOSE 159*  --   --   --  118*   BUN 24*  --   --   -- 35*   CREATININE 0.93*  --   --   --  0.86   MG 1.4*  --   --   --   --    ANIONGAP 20*  --   --   --  14   LABGLOM 58*  --   --   --  >60   GFRAA >60  --   --   --  >60   CALCIUM 10.3  --   --   --  8.7   PROBNP 1,774*  --   --   --   --    TROPHS 26*  --   --   --   --     < > = values in this interval not displayed. Recent Labs     08/28/22  1632 08/30/22  0904   PROT 7.1 5.2*   LABALBU 3.4* 2.4*   AST 42* 67*   ALT 33 23   ALKPHOS 118* 157*   BILITOT 0.38 0.69   LIPASE 1,349* 461*     ABG:No results found for: POCPH, PHART, PH, POCPCO2, QIZ4LWG, PCO2, POCPO2, PO2ART, PO2, POCHCO3, TJH2GGZ, HCO3, NBEA, PBEA, BEART, BE, THGBART, THB, MFW8AMH, ZDHC8ZPP, N8MKZIWP, O2SAT, FIO2  Lab Results   Component Value Date/Time    SPECIAL 6ML LEFT HAND 02/02/2021 07:25 PM     Lab Results   Component Value Date/Time    CULTURE NO GROWTH 6 DAYS 02/02/2021 07:25 PM       Radiology:  XR CHEST PORTABLE    Result Date: 8/28/2022  Possible mild pulmonary vascular congestion     MRI ABDOMEN W WO CONTRAST MRCP    Result Date: 8/29/2022  1. Acute pancreatitis with hypoenhancement of the pancreatic parenchyma suspicious for necrotizing pancreatitis. Extensive peripancreatic inflammation and small volume free fluid within the upper abdomen. No focal fluid collection. 2.  No biliary ductal dilation or choledocholithiasis. 3.  Cholelithiasis without acute cholecystitis. 4.  Hepatic steatosis. Mildly nodular liver contour is suggestive of chronic liver disease. CTA ABDOMEN PELVIS W CONTRAST    Result Date: 8/28/2022  1. Findings of acute uncomplicated pancreatitis with secondary reactive edematous wall thickening of the duodenum. 2. Small amount of reactive free fluid throughout the peritoneal cavity. No evidence of abscess. 3. Mild-to-moderate atherosclerotic disease as described above. No aortic aneurysm or dissection.        Physical Examination:       General appearance:  alert, cooperative and no distress  Mental Status: oriented to person and family in the room  Lungs:  clear to auscultation bilaterally, normal effort  Heart:  regular rate and rhythm, no murmur  Abdomen:  soft, mild tenderness to palpation, nondistended, normal bowel sounds, no masses, hepatomegaly, splenomegaly  Extremities:  no edema, redness, tenderness in the calves  Skin:  no gross lesions, rashes, induration    Assessment:     Hospital Problems             Last Modified POA    * (Principal) Acute pancreatitis without necrosis or infection, unspecified 8/28/2022 Yes    Hyperlipidemia 8/28/2022 Yes    Hyponatremia 8/28/2022 Yes    Dementia ([address] 8/28/2022 Yes       Plan:     Acute pancreatitis/necrotizing pancreatitis  Continue meropenem and pain control  Okay to trial clear liquids  Hyperkalemia  Likely hemolyzed specimen, okay to draw blood off PICC line  Hyponatremia  Improving, secondary to poor oral intake  Dementia  Supportive care      Genoveva Maldonado DO  8/30/2022  12:05 PM

## 2022-08-30 NOTE — PROGRESS NOTES
Occupational Therapy  Facility/Department: [facility] SURG ICU  Daily Treatment Note      NAME: Jackie Guallpa  : 1940  MRN: [id number]    Date of Service: 2022    Discharge Recommendations:  Patient would benefit from continued therapy after discharge, 24 hour supervision or assist    OT Equipment Recommendations  Other: Will continue to make appropriate DME / AE recommendations as Pt progresses with OT POC. Patient Diagnosis(es): The encounter diagnosis was Acute pancreatitis, unspecified complication status, unspecified pancreatitis type. Assessment    Activity Tolerance: Patient limited by fatigue;Patient limited by pain;Treatment limited secondary to decreased cognition;Patient limited by endurance  Discharge Recommendations: Patient would benefit from continued therapy after discharge;24 hour supervision or assist  Other: Will continue to make appropriate DME / AE recommendations as Pt progresses with OT POC. Plan   Plan  Times per Week: 5-6x/wk  Times per Day: Daily  Current Treatment Recommendations: Strengthening;Balance training;Functional mobility training;Patient/Caregiver education & training;Self-Care / ADL; Home management training;Equipment evaluation, education, & procurement; Safety education & training; Endurance training     Restrictions  Restrictions/Precautions  Restrictions/Precautions: Fall Risk  Required Braces or Orthoses?: No  Position Activity Restriction  Other position/activity restrictions: Up with assistance      Subjective   Subjective  Subjective: RN approved Pt to be seen for OT / PT treatment sessions. Pain: Pt reported / made faces at start and throughout session that she was in severe pain. Continually reported that she was very painful / hurting in abdomen. RN notified, pt was provided several times during session / after session / throughout the day with warm blankets on abdomen and back.   Orientation  Overall Orientation Status: Impaired  Orientation Level: Oriented to person;Disoriented to situation;Disoriented to time  Cognition  Overall Cognitive Status: Exceptions  Following Commands: Follows one step commands with repetition; Follows one step commands with increased time  Attention Span: Difficulty attending to directions; Attends with cues to redirect  Memory: Decreased recall of biographical Information;Decreased recall of precautions;Decreased recall of recent events;Decreased short term memory  Safety Judgement: Decreased awareness of need for assistance;Decreased awareness of need for safety  Problem Solving: Assistance required to identify errors made;Assistance required to generate solutions  Insights: Decreased awareness of deficits  Initiation: Requires cues for all  Sequencing: Requires cues for all  Cognition Comment: Pt with noted baseline dementia. Objective    Bed Mobility Training  Bed Mobility Training: Yes  Interventions: Visual cues; Verbal cues; Tactile cues (Max Cues for task initiation / sequencing / active participation (d/t severity of abdominal pain))  Supine to Sit: Moderate assistance; Additional time; Adaptive equipment (HOB elevated, Bedrail (Right))  Scooting: Minimum assistance; Additional time; Adaptive equipment    Balance  Sitting: With support  Standing: With support  Transfer Training  Transfer Training: Yes  Overall Level of Assistance: Minimum assistance; Additional time; Adaptive equipment (Using RW)  Interventions: Visual cues; Verbal cues; Tactile cues; Safety awareness training (Max Cues task initiation & sequencing // accurate use of DME)  Sit to Stand: Minimum assistance; Additional time; Adaptive equipment  Stand to Sit: Minimum assistance; Additional time; Adaptive equipment  Stand Pivot Transfers: Additional time; Adaptive equipment;Minimum assistance  Bed to Chair: Minimum assistance; Additional time; Adaptive equipment  Toilet Transfer: Minimum assistance; Additional time; Adaptive equipment (Using Standard BSC and RW)    Gait  Overall Level of Assistance: Minimum assistance; Additional time; Adaptive equipment (Using RW for short distance in-room mobility. MIn Assist)  Interventions: Visual cues; Verbal cues; Tactile cues; Safety awareness training (Max Cues task initiation & sequencing // accurate use of DME)    ADL  UE Dressing: Minimal assistance; Increased time to complete;Verbal cueing  UE Dressing Skilled Clinical Factors: Sitting EOB to doff and don gown like robe. LE Dressing: Maximum assistance; Moderate assistance;Minimal assistance; Adaptive equipment; Increased time to complete;Verbal cueing  LE Dressing Skilled Clinical Factors: Max Assist to don Bilateral socks sitting at EOB. Mod Assist (in standing, with Min assist with RW) to pull brief over hips). Toileting: Moderate assistance;Minimal assistance; Increased time to complete; Adaptive equipment;Maximum assistance  Toileting Skilled Clinical Factors: Max Assist toilet hygiene (standing using BSC with RW). Mod Assist (in standing, with Min assist with RW) to pull brief over hips. Safety Devices  Type of Devices: Call light within reach;Gait belt;Left in chair;Nurse notified; Chair alarm in place  Restraints  Restraints Initially in Place: No     Patient Education  Education Given To: Patient; Family  Education Provided: Plan of Care;Equipment;Transfer Training;Energy Conservation;Role of Therapy; Fall Prevention Strategies;Precautions; ADL Adaptive Strategies  Education Method: Demonstration;Verbal  Barriers to Learning: Cognition  Education Outcome: Verbalized understanding;Continued education needed      Goals  Short Term Goals  Time Frame for Short term goals: 14 visits  Short Term Goal 1: Pt will perform ADL tasks with mod IND including setup and VCs PRN  Short Term Goal 2: Pt will perform functional transfers/functional mobility with mod IND using least restrictive AD and VCs PRN  Short Term Goal 3: Pt will demo 8+ minutes standing tolerance with use of least restrictive AD for increased participation in ADL tasks      Therapy Time   Individual Concurrent Group Co-treatment   Time In 0842         Time Out 0910         Minutes 28         Timed Code Treatment Minutes: 10 Minutes (ADL)    GINETTE Lozano, OTR/L

## 2022-08-30 NOTE — CARE COORDINATION
08/30/22 1428   Service Assessment   Patient Orientation Person   Cognition Dementia / Early Alzheimer's   History Provided By Child/Family;Significant Other   Primary Caregiver Family   Support Systems Spouse/Significant Other   Patient's Healthcare Decision Maker is: Legal Next of Nancy Hurst   PCP Verified by CM Yes   Last Visit to PCP Within last 3 months   Prior Functional Level Independent in ADLs/IADLs   Current Functional Level Assistance with the following:   Can patient return to prior living arrangement Yes   Social/Functional History   Lives With Spouse   Type of [address] Two level;Bed/Bath upstairs;1/2 bath on main level   Home Access Stairs to enter without rails   Entrance Stairs - Number of Steps 2   Bathroom Shower/Tub Walk-in shower   Bathroom Toilet Standard   Bathroom Accessibility Accessible   Receives Help From Family   ADL Assistance Independent   Homemaking Assistance Needs assistance   Homemaking Responsibilities No   Ambulation Assistance Independent   Transfer Assistance Independent   Active  No   Patient's  Info  drives   Occupation Retired   Discharge Planning   Type of [address] Prior To Admission None   Potential Assistance Needed N/A   DME Ordered? No   Type of Home Care Services None   Patient expects to be discharged to: House   Follow Up Appointment: Best Day/Time  Monday AM   Services At/After Discharge   Services At/After Discharge Nursing services   Mode of Transport at Discharge Self   Confirm Follow Up Transport Family   Condition of Participation: Discharge Planning   The Patient and/or Patient Representative was provided with a Choice of Provider? Patient Representative   Name of the Patient Representative who was provided with the Choice of Provider and agrees with the Discharge Plan?   Roselyn Coffey, spouse   Freedom of Choice list was provided with basic dialogue that supports the patient's individualized plan of care/goals, treatment preferences, and shares the quality data associated with the providers?   Yes       D/c home with spouse, monitor for IV antibiotics

## 2022-08-30 NOTE — PROGRESS NOTES
Physical Therapy  Facility/Department: [facility]  Physical Therapy Daily Progress Note    Name: Josue Kirby  : 1940  MRN: [id number]  Date of Service: 2022    Discharge Recommendations:  Patient would benefit from continued therapy after discharge   PT Equipment Recommendations  Equipment Needed: Yes  Walker: Rolling  Other: Continue to assess but likely will benefit from pending overall pain control at d/c. Patient Diagnosis(es): The encounter diagnosis was Acute pancreatitis, unspecified complication status, unspecified pancreatitis type. Past Medical History:  has a past medical history of Accelerated hypertension, Atypical chest pain, Hyperlipidemia, Hypertension, and Labile blood pressure. Past Surgical History:  has a past surgical history that includes Hysterectomy; Abdomen surgery; LIGATION OF SAPHENOUS VEIN; and bladder suspension. Assessment   Body Structures, Functions, Activity Limitations Requiring Skilled Therapeutic Intervention: Decreased functional mobility ; Decreased strength;Decreased safe awareness;Decreased cognition;Decreased endurance;Decreased balance;Decreased posture; Increased pain  Assessment: Pt with limited mobility with Min assist for transfers and gait due to c/o abdominal pain and cognitive deficits. Pt ambulated with RW 10' and 15' x 2 with Min assist for balance deficits and walker management. Pt lives with spouse and pending additional progress should be able to return home with 24/7 assistance. Pt may benefit from therapy upon discharge as she Independently ambulated without a device prior to discharge and has steps to negotiate at home.   Therapy Prognosis: Good  Requires PT Follow-Up: Yes  Activity Tolerance  Activity Tolerance: Patient limited by fatigue;Patient limited by pain;Treatment limited secondary to decreased cognition     Plan   Plan  Plan:  (5-6x/week)  Current Treatment Recommendations: Strengthening, Balance training, functional  Coordination: Within functional limits  Tone: Normal  Sensation: Intact        Bed mobility  Supine to Sit: Moderate assistance  Scooting: Minimal assistance  Bed Mobility Comments: Head of bed elevated 20 degrees; pt required assistance to bring LE's off edge of bed and trunk upright. Pt retired to chair at end of session. Transfers  Sit to Stand: Minimal Assistance  Stand to sit: Minimal Assistance  Comment: transfers with RW; cues for sequencing, cues for hand placement  Ambulation  Surface: level tile  Device: Rolling Walker  Assistance: Minimal assistance  Quality of Gait: mild unsteady, forward flexed, cues for sequencing, increased complaints of pain  Gait Deviations: Slow Allyssa;Decreased step length;Decreased step height;Shuffles  Distance: 10' x 1, 15' x 2  Comments: Pt with constant c/o abdominal pain. Patient toileted x 2 occasions.   More Ambulation?: No  Stairs/Curb  Stairs?: No     Balance  Posture: Fair  Sitting - Static: Good;-  Sitting - Dynamic: Fair  Standing - Static: Fair  Standing - Dynamic: Fair  Comments: balance assessed with RW           OutComes Score                                                  AM-PAC Score  AM-PAC Inpatient Mobility Raw Score : 16 (08/30/22 1539)  AM-PAC Inpatient T-Scale Score : 40.78 (08/30/22 1539)  Mobility Inpatient CMS 0-100% Score: 54.16 (08/30/22 1539)  Mobility Inpatient CMS G-Code Modifier : CK (08/30/22 1539)             Goals  Short Term Goals  Time Frame for Short term goals: 14 visits  Short term goal 1: Pt to ambulate 300ft modified independently with least restrictive device  Short term goal 2: Pt to sit <> stand transfer independently  Short term goal 3: Pt to demonstrate independent bed mobility  Short term goal 4: Pt to demonstrate good - standing balance to decrease risk of falls  Short term goal 5: Pt to ascend/descend flight of stairs with one rail modified independently       Education  Patient Education  Education Given To: Patient;Caregiver  Education Provided: Plan of Care  Education Provided Comments: importance of mobility, use of RW  Education Method: Verbal  Barriers to Learning: None;Cognition  Education Outcome: Verbalized understanding      Therapy Time   Individual Concurrent Group Co-treatment   Time In 0842  1100         Time Out 0909  1116         Minutes 27   16         Timed Code Treatment Minutes: 2201 Kirk Castro, PT

## 2022-08-30 NOTE — PROGRESS NOTES
Writer notified physician of patient's urinary output = 150 mL throughout the shift. Physician requested bladder scan be obtained. Bladder scan volume = 254 mL. Will continue to monitor.

## 2022-08-31 PROBLEM — Z71.89 ACP (ADVANCE CARE PLANNING): Status: ACTIVE | Noted: 2022-08-31

## 2022-08-31 PROBLEM — Z51.5 ENCOUNTER FOR PALLIATIVE CARE: Status: ACTIVE | Noted: 2022-08-31

## 2022-08-31 PROBLEM — Z71.89 GOALS OF CARE, COUNSELING/DISCUSSION: Status: ACTIVE | Noted: 2022-08-31

## 2022-08-31 LAB
ABSOLUTE EOS #: 0 K/UL (ref 0–0.4)
ABSOLUTE LYMPH #: 0.29 K/UL (ref 1–4.8)
ABSOLUTE MONO #: 0.58 K/UL (ref 0.1–0.8)
ALBUMIN SERPL-MCNC: 1.3 G/DL (ref 3.5–5.2)
ALBUMIN/GLOBULIN RATIO: 0.4 (ref 1–2.5)
ALP BLD-CCNC: 260 U/L (ref 35–104)
ALT SERPL-CCNC: 16 U/L (ref 5–33)
ANION GAP SERPL CALCULATED.3IONS-SCNC: 11 MMOL/L (ref 9–17)
AST SERPL-CCNC: 56 U/L
BASOPHILS # BLD: 0 % (ref 0–2)
BASOPHILS ABSOLUTE: 0 K/UL (ref 0–0.2)
BILIRUB SERPL-MCNC: 1.13 MG/DL (ref 0.3–1.2)
BUN BLDV-MCNC: 47 MG/DL (ref 8–23)
CALCIUM SERPL-MCNC: 8.1 MG/DL (ref 8.6–10.4)
CHLORIDE BLD-SCNC: 106 MMOL/L (ref 98–107)
CO2: 13 MMOL/L (ref 20–31)
CREAT SERPL-MCNC: 0.89 MG/DL (ref 0.5–0.9)
EOSINOPHILS RELATIVE PERCENT: 0 % (ref 1–4)
GFR AFRICAN AMERICAN: >60 ML/MIN
GFR NON-AFRICAN AMERICAN: >60 ML/MIN
GFR SERPL CREATININE-BSD FRML MDRD: ABNORMAL ML/MIN/{1.73_M2}
GLUCOSE BLD-MCNC: 91 MG/DL (ref 70–99)
HCT VFR BLD CALC: 41.4 % (ref 36–46)
HEMOGLOBIN: 13.7 G/DL (ref 12–16)
LIPASE: 145 U/L (ref 13–60)
LYMPHOCYTES # BLD: 2 % (ref 24–44)
MCH RBC QN AUTO: 30.9 PG (ref 26–34)
MCHC RBC AUTO-ENTMCNC: 33.1 G/DL (ref 31–37)
MCV RBC AUTO: 93.2 FL (ref 80–100)
MONOCYTES # BLD: 4 % (ref 1–7)
MORPHOLOGY: ABNORMAL
MORPHOLOGY: ABNORMAL
PDW BLD-RTO: 16.1 % (ref 12.5–15.4)
PLATELET # BLD: 135 K/UL (ref 140–450)
PMV BLD AUTO: 12.1 FL (ref 8–14)
POTASSIUM SERPL-SCNC: 3.9 MMOL/L (ref 3.7–5.3)
RBC # BLD: 4.44 M/UL (ref 4–5.2)
SEG NEUTROPHILS: 94 % (ref 36–66)
SEGMENTED NEUTROPHILS ABSOLUTE COUNT: 13.63 K/UL (ref 1.8–7.7)
SODIUM BLD-SCNC: 130 MMOL/L (ref 135–144)
TOTAL PROTEIN: 4.3 G/DL (ref 6.4–8.3)
WBC # BLD: 14.5 K/UL (ref 3.5–11)

## 2022-08-31 PROCEDURE — 85025 COMPLETE CBC W/AUTO DIFF WBC: CPT

## 2022-08-31 PROCEDURE — 6360000002 HC RX W HCPCS: Performed by: HOSPITALIST

## 2022-08-31 PROCEDURE — 97530 THERAPEUTIC ACTIVITIES: CPT

## 2022-08-31 PROCEDURE — 2580000003 HC RX 258: Performed by: STUDENT IN AN ORGANIZED HEALTH CARE EDUCATION/TRAINING PROGRAM

## 2022-08-31 PROCEDURE — 1210000000 HC MED SURG R&B

## 2022-08-31 PROCEDURE — 99232 SBSQ HOSP IP/OBS MODERATE 35: CPT | Performed by: HOSPITALIST

## 2022-08-31 PROCEDURE — 6360000002 HC RX W HCPCS: Performed by: STUDENT IN AN ORGANIZED HEALTH CARE EDUCATION/TRAINING PROGRAM

## 2022-08-31 PROCEDURE — 36415 COLL VENOUS BLD VENIPUNCTURE: CPT

## 2022-08-31 PROCEDURE — 99222 1ST HOSP IP/OBS MODERATE 55: CPT | Performed by: NURSE PRACTITIONER

## 2022-08-31 PROCEDURE — 80053 COMPREHEN METABOLIC PANEL: CPT

## 2022-08-31 PROCEDURE — 83690 ASSAY OF LIPASE: CPT

## 2022-08-31 RX ORDER — MORPHINE SULFATE 4 MG/ML
3 INJECTION, SOLUTION INTRAMUSCULAR; INTRAVENOUS
Status: DISCONTINUED | OUTPATIENT
Start: 2022-08-31 | End: 2022-08-31

## 2022-08-31 RX ORDER — MORPHINE SULFATE 4 MG/ML
5 INJECTION, SOLUTION INTRAMUSCULAR; INTRAVENOUS
Status: DISCONTINUED | OUTPATIENT
Start: 2022-08-31 | End: 2022-09-02 | Stop reason: HOSPADM

## 2022-08-31 RX ORDER — MORPHINE SULFATE 2 MG/ML
1 INJECTION, SOLUTION INTRAMUSCULAR; INTRAVENOUS
Status: DISCONTINUED | OUTPATIENT
Start: 2022-08-31 | End: 2022-09-02 | Stop reason: HOSPADM

## 2022-08-31 RX ORDER — MORPHINE SULFATE 2 MG/ML
1 INJECTION, SOLUTION INTRAMUSCULAR; INTRAVENOUS
Status: DISCONTINUED | OUTPATIENT
Start: 2022-08-31 | End: 2022-08-31

## 2022-08-31 RX ORDER — MORPHINE SULFATE 4 MG/ML
5 INJECTION, SOLUTION INTRAMUSCULAR; INTRAVENOUS
Status: DISCONTINUED | OUTPATIENT
Start: 2022-08-31 | End: 2022-08-31

## 2022-08-31 RX ORDER — MORPHINE SULFATE 2 MG/ML
2 INJECTION, SOLUTION INTRAMUSCULAR; INTRAVENOUS
Status: DISCONTINUED | OUTPATIENT
Start: 2022-08-31 | End: 2022-08-31

## 2022-08-31 RX ORDER — OXYCODONE HYDROCHLORIDE 5 MG/1
5 TABLET ORAL EVERY 4 HOURS PRN
Status: DISCONTINUED | OUTPATIENT
Start: 2022-08-31 | End: 2022-08-31

## 2022-08-31 RX ORDER — MORPHINE SULFATE 4 MG/ML
3 INJECTION, SOLUTION INTRAMUSCULAR; INTRAVENOUS
Status: DISCONTINUED | OUTPATIENT
Start: 2022-08-31 | End: 2022-09-02 | Stop reason: HOSPADM

## 2022-08-31 RX ORDER — OXYCODONE HYDROCHLORIDE 5 MG/1
10 TABLET ORAL EVERY 4 HOURS PRN
Status: DISCONTINUED | OUTPATIENT
Start: 2022-08-31 | End: 2022-08-31

## 2022-08-31 RX ADMIN — MORPHINE SULFATE 2 MG: 2 INJECTION, SOLUTION INTRAMUSCULAR; INTRAVENOUS at 12:34

## 2022-08-31 RX ADMIN — MORPHINE SULFATE 4 MG: 4 INJECTION INTRAVENOUS at 06:28

## 2022-08-31 RX ADMIN — SODIUM CHLORIDE: 9 INJECTION, SOLUTION INTRAVENOUS at 14:49

## 2022-08-31 RX ADMIN — MORPHINE SULFATE 3 MG: 4 INJECTION INTRAVENOUS at 18:47

## 2022-08-31 RX ADMIN — MEROPENEM 1000 MG: 1 INJECTION, POWDER, FOR SOLUTION INTRAVENOUS at 04:11

## 2022-08-31 RX ADMIN — MORPHINE SULFATE 3 MG: 4 INJECTION INTRAVENOUS at 21:49

## 2022-08-31 RX ADMIN — MORPHINE SULFATE 4 MG: 4 INJECTION INTRAVENOUS at 01:40

## 2022-08-31 RX ADMIN — MEROPENEM 1000 MG: 1 INJECTION, POWDER, FOR SOLUTION INTRAVENOUS at 15:42

## 2022-08-31 RX ADMIN — MORPHINE SULFATE 3 MG: 4 INJECTION INTRAVENOUS at 16:33

## 2022-08-31 ASSESSMENT — PAIN SCALES - GENERAL
PAINLEVEL_OUTOF10: 5
PAINLEVEL_OUTOF10: 10
PAINLEVEL_OUTOF10: 5

## 2022-08-31 ASSESSMENT — PAIN DESCRIPTION - LOCATION: LOCATION: ABDOMEN

## 2022-08-31 NOTE — PLAN OF CARE
Problem: Discharge Planning  Goal: Discharge to home or other facility with appropriate resources  8/30/2022 2135 by Odilon Montero RN  Outcome: Progressing     Problem: Pain  Goal: Verbalizes/displays adequate comfort level or baseline comfort level  8/30/2022 2135 by Odilon Montero RN  Outcome: Progressing     Problem: Safety - Adult  Goal: Free from fall injury  8/30/2022 2135 by Odilon Montero RN  Outcome: Progressing     Problem: ABCDS Injury Assessment  Goal: Absence of physical injury  8/30/2022 2135 by Odilon Montero RN  Outcome: Progressing     Problem: Skin/Tissue Integrity  Goal: Absence of new skin breakdown  Description: 1. Monitor for areas of redness and/or skin breakdown  2. Assess vascular access sites hourly  3. Every 4-6 hours minimum:  Change oxygen saturation probe site  4. Every 4-6 hours:  If on nasal continuous positive airway pressure, respiratory therapy assess nares and determine need for appliance change or resting period.   8/30/2022 2135 by Odilon Montero RN  Outcome: Progressing

## 2022-08-31 NOTE — CONSULTS
Palliative Care Inpatient Consult    NAME:  Chris Quiroz  MEDICAL RECORD NUMBER:  [id number]  AGE: 79 y.o. GENDER: female  : 1940  TODAY'S DATE:  2022    Reasons for Consultation:    Symptom management   Goals of care   Family support    Members of PC team contributing to this consultation are :  Shazia Campbell CNP  Palliative care     Summary   Patient is a [facility]  Patient with family meeting with [address] tomorrow at 330 pm  Comfort medications ordered     Plan      Palliative Interaction:  I went and spoke with patient  Shen Christiansen, and daughter and introduced myself and my palliative care role. Patient has been  to Bronwyn Ferraro for 61 years and have 3 biological children 2 sons and 1 daughter. Patient has [facility] [employer]. POA but not on file. By [facility] patient  Bronwyn Ferraro would be primary decision maker. Bronwyn Ferraro states he is working with his children to make decisions    Patient is currently a [facility] comfort care and has Hospice meeting with [address] [place] [address] hospice set up for 330 pm tomorrow. We discussed difference between skilled care and hospice and I answered patient questions to the best of my ability. Emotional support provided and Palliative care will continue to follow       Education/support to family  Discharge planning/helping to coordinate care  Communications with primary service  Pharmacologic pain management  Providing support for coping/adaptation/distress of family  Discussing meaning/purpose   Caregiver support/education  Continue with current plan of care  Code status clarified: [facility]  Provided information about hospice  Principle Problem/Diagnosis:  Acute pancreatitis without necrosis or infection, unspecified    Additional Assessments:   Principal Problem:    Acute pancreatitis without necrosis or infection, unspecified  Active Problems:    Hyperlipidemia    Hyponatremia    Dementia ([facility] [address]  Resolved Problems:    * No resolved hospital problems.  *   1- Symptom management/ pain control     Pain Assessment:  comfort measures                Anxiety:   comfort measures                           Dyspnea:   oxygen per NC                           Fatigue:   generalized weakness     Other:      2- Goals of care evaluation   The patient goals of care are provide comfort care/support/palliation/relieve suffering   Goals of care discussed with:    [] Patient independently    [] Patient and Family    [x] Family or Healthcare DPOA independently    [] Unable to discuss with patient, family/DPOA not present    3- Code Status  DNR-CC    4- Other recommendations   - We will continue to provide comfort and support to the patient and the family    Palliative Care will continue to follow Ms. Ribeiro's care as needed. History of Present Illness     The patient is a 79 y.o. Non- / non  female who presents with Abdominal Pain and Drug Overdose      Referred to Palliative Care by   [x] Physician   [] Nursing  [] Family Request   [] Other:       She was admitted to the Progressive care  service for Acute pancreatitis without necrosis or infection, unspecified [K85.90]  Acute pancreatitis, unspecified complication status, unspecified pancreatitis type [K85.90]. Her hospital course has been associated with Acute pancreatitis without necrosis or infection, unspecified. The patient has a complicated medical history and has been hospitalized since 8/28/2022  3:48 PM.    JUDD is a 79year old female with past medical history of hypertension, CP, Hyperlipidemia, dementia. She came to ER with complaints of abdomen pain , nausea, and anorexia. They state her pain started several days ago and progressively got worse. In ER she was tachycardic and her Lipase was 1349. She had CT abdomen and pelvis that showed   1. Findings of acute uncomplicated pancreatitis with secondary reactive   edematous wall thickening of the duodenum.    2. Small amount of reactive free fluid throughout the peritoneal cavity. No   evidence of abscess. 3. Mild-to-moderate atherosclerotic disease as described above. No aortic   aneurysm or dissection. She also recently had MRI abdomen that showed   1. Acute pancreatitis with hypoenhancement of the pancreatic parenchyma   suspicious for necrotizing pancreatitis. Extensive peripancreatic   inflammation and small volume free fluid within the upper abdomen. No focal   fluid collection. 2.  No biliary ductal dilation or choledocholithiasis. 3.  Cholelithiasis without acute cholecystitis. 4.  Hepatic steatosis. Mildly nodular liver contour is suggestive of chronic   liver disease. Patient  and children have decided against any surgical intervention and using medical management of symptoms presently. Patient continues on IV Merrem for pancreatitis. She is drowsy and does not answer questions or follow commands. Patient has the following consults General surgery and Palliative care. Palliative care will continue to follow   Active Hospital Problems    Diagnosis Date Noted    Acute pancreatitis without necrosis or infection, unspecified [K85.90] 08/28/2022     Priority: Medium    Dementia ([facility] [address] [F03.90] 01/28/2021    Hyponatremia [E87.1] 01/09/2020    Hyperlipidemia [E78.5] 12/16/2019       PAST MEDICAL HISTORY      Diagnosis Date    Accelerated hypertension     Atypical chest pain     Hyperlipidemia     Hypertension     Labile blood pressure        PAST SURGICAL HISTORY  Past Surgical History:   Procedure Laterality Date    ABDOMEN SURGERY      colon resection    BLADDER SUSPENSION      HYSTERECTOMY (CERVIX STATUS UNKNOWN)      LIGATION OF SAPHENOUS VEIN         SOCIAL HISTORY  Social History     Tobacco Use    Smoking status: Former    Smokeless tobacco: Never    Tobacco comments:     quit 50 years ago   Substance Use Topics    Alcohol use: Not Currently     Comment: rare wine    Drug use: Never       FAMILY HISTORY  . Beulah Apprats   Family History   Problem Relation Age of Onset    Heart Disease Mother 78    Alzheimer's Disease Father 67    Heart Attack Brother 62    Heart Disease Brother     Heart Disease Brother         ALLERGIES  Allergies   Allergen Reactions    Doxycycline Other (See Comments)     Other reaction(s): diarrhea.stomach pain      Azithromycin Other (See Comments)    Ciprofloxacin Other (See Comments)         MEDICATIONS  Current Medications    sodium chloride  100 mL IntraVENous Once    meropenem  1,000 mg IntraVENous Q12H    HYDROmorphone  0.5 mg IntraVENous Once    lidocaine 1 % injection  5 mL IntraDERmal Once    sodium chloride flush  5-40 mL IntraVENous 2 times per day    sodium chloride  80 mL IntraVENous Once    sodium chloride flush  5-40 mL IntraVENous 2 times per day    enoxaparin  40 mg SubCUTAneous Daily    hydrALAZINE  25 mg Oral 3 times per day    NIFEdipine  60 mg Oral Daily     oxyCODONE **OR** oxyCODONE, morphine **OR** [DISCONTINUED] morphine, sodium chloride flush, sodium chloride, sodium chloride flush, sodium chloride flush, sodium chloride, ondansetron **OR** ondansetron, polyethylene glycol, acetaminophen **OR** acetaminophen, potassium chloride **OR** potassium alternative oral replacement **OR** potassium chloride, magnesium sulfate  IV Drips/Infusions   sodium chloride      sodium chloride      sodium chloride 125 mL/hr (08/30/22 0550)     Home Medications  No current facility-administered medications on file prior to encounter.      Current Outpatient Medications on File Prior to Encounter   Medication Sig Dispense Refill    ferrous sulfate (FE TABS 325) 325 (65 Fe) MG EC tablet Take 1 tablet by mouth daily (with breakfast) (Patient not taking: Reported on 8/28/2022) 90 tablet 3    pantoprazole (PROTONIX) 40 MG tablet Take 1 tablet by mouth daily 30 tablet 3    Omega-3 Fatty Acids (FISH OIL PO) Take by mouth      Cholecalciferol (VITAMIN D3) 50 MCG (2000 UT) CAPS Take by mouth      Misc Natural Products (GLUCOSAMINE-CHONDROITIN PLUS PO) Take by mouth         Data         /72   Pulse (!) 144   Temp 98.4 °F (36.9 °C) (Axillary)   Resp 15   Ht 5' 1\" (1.549 m)   Wt 133 lb 2.5 oz (60.4 kg)   SpO2 91%   BMI 25.16 kg/m²     Wt Readings from Last 3 Encounters:   08/31/22 133 lb 2.5 oz (60.4 kg)   08/17/22 130 lb (59 kg)   08/08/22 117 lb (53.1 kg)        Lab Results   Component Value Date    WBC 14.5 (H) 08/31/2022    HGB 13.7 08/31/2022    HCT 41.4 08/31/2022    MCV 93.2 08/31/2022     (L) 08/31/2022    ,   Lab Results   Component Value Date/Time     08/31/2022 05:41 AM    K 3.9 08/31/2022 05:41 AM     08/31/2022 05:41 AM    CO2 13 08/31/2022 05:41 AM    BUN 47 08/31/2022 05:41 AM    CREATININE 0.89 08/31/2022 05:41 AM    GLUCOSE 91 08/31/2022 05:41 AM    CALCIUM 8.1 08/31/2022 05:41 AM    ,   Lab Results   Component Value Date/Time    MG 1.4 08/28/2022 04:32 PM    ,  Lab Results   Component Value Date    CALCIUM 8.1 (L) 08/31/2022        Xray Result (most recent):  XR CHEST PORTABLE 08/28/2022    Narrative  EXAMINATION:  ONE XRAY VIEW OF THE CHEST    8/28/2022 2:21 pm    COMPARISON:  02/03/2021    HISTORY:  ORDERING SYSTEM PROVIDED HISTORY: abdominal pain  TECHNOLOGIST PROVIDED HISTORY:  abdominal pain  Reason for Exam: abdominal pain    FINDINGS:  . The cardiac size is normal. No acute infiltrates or pleural effusions are  seen. Pulmonary vascularity appears slightly congested. There is mild  ectasia of the thoracic aorta. There are degenerative changes in the spine . No acute bony abnormalities. The hilar structures are normal.    Impression  Possible mild pulmonary vascular congestion       MRI Result (most recent):  MRI ABDOMEN W WO CONTRAST MRCP 08/29/2022 (Preliminary)  This result has not been signed. Information might be incomplete.     Narrative  EXAMINATION:  MRI OF THE ABDOMEN WITH AND WITHOUT CONTRAST AND MRCP 8/29/2022 12:17 pm    TECHNIQUE:  Multiplanar multisequence MRI of the abdomen was performed with and without  the administration of intravenous contrast.  After initial T2 axial and  coronal images, thick slab, thin slab and 3D coronal MRCP sequences were  obtained without the administration of intravenous contrast.  MIP images are  provided for review. COMPARISON:  08/28/2022 CTA abdomen/pelvis. HISTORY:  ORDERING SYSTEM PROVIDED HISTORY: Pancreatitis; r/o biliary obstruction  TECHNOLOGIST PROVIDED HISTORY:  Pancreatitis; r/o biliary obstruction  What is the sedation requirement?->None  Decision Support Exception - unselect if not a suspected or confirmed  emergency medical condition->Emergency Medical Condition (MA)  Reason for Exam: Pancreatitis; r/o biliary obstruction    FINDINGS:  Lung Bases: Trace bilateral pleural effusions. The heart is normal in size. Liver: Hepatic steatosis. Mildly nodular liver contour. Biliary and Gallbladder: The common bile duct is within normal limits of size  measuring 0.6 cm. No intrahepatic biliary ductal dilation. No evidence of  cholelithiasis. The common bile duct tapers smoothly to the ampulla. Cholelithiasis without evidence of acute cholecystitis. Spleen: The spleen is normal in size. Scattered small T2 hyperintense,  nonenhancing lesions of the spleen likely represent cysts. Pancreas: There is diffuse hypoenhancement of the pancreas with significant  surrounding inflammatory change. No focal fluid collection. The pancreatic  duct is not well seen. Adrenal Glands: The adrenal glands are normal in appearance. Kidneys: Normal renal contour. No suspicious renal lesion. No hydronephrosis. Abdominal Vasculature: The abdominal aorta is normal in diameter. Gastrointestinal Tract: No evidence of bowel obstruction. Peritoneum/Mesentery/Retroperitoneum: Trace upper abdominal free fluid. Lymph Nodes: No retroperitoneal lymphadenopathy. Musculoskeletal: No suspicious osseous findings. Impression  1. Acute pancreatitis with hypoenhancement of the pancreatic parenchyma  suspicious for necrotizing pancreatitis. Extensive peripancreatic  inflammation and small volume free fluid within the upper abdomen. No focal  fluid collection. 2.  No biliary ductal dilation or choledocholithiasis. 3.  Cholelithiasis without acute cholecystitis. 4.  Hepatic steatosis. Mildly nodular liver contour is suggestive of chronic  liver disease. CT Result (most recent):  CTA ABDOMEN AND PELVIS W CONTRAST 08/28/2022    Narrative  EXAMINATION:  CTA OF THE ABDOMEN AND PELVIS WITH CONTRAST    8/28/2022 5:19 pm:    TECHNIQUE:  CTA of the abdomen and pelvis was performed with the administration of  intravenous contrast. Multiplanar reformatted images are provided for review. MIP images are provided for review. Automated exposure control, iterative  reconstruction, and/or weight based adjustment of the mA/kV was utilized to  reduce the radiation dose to as low as reasonably achievable. COMPARISON:  06/31/2021    HISTORY:  ORDERING SYSTEM PROVIDED HISTORY: generalized abd pain  TECHNOLOGIST PROVIDED HISTORY:  generalized abd pain  Decision Support Exception - unselect if not a suspected or confirmed  emergency medical condition->Emergency Medical Condition (MA)    FINDINGS:    CTA ABDOMEN:    No acute abnormality in the visualized lower thorax. The abdominal aorta is normal in caliber with mild scattered calcific  atherosclerotic plaque. Moderate stenosis of the celiac, superior  mesenteric, and right renal artery origins. Mild stenosis of the left renal  artery origin. Moderate multifocal stenosis of the inferior mesenteric  artery. No aortic dissection. The liver is unremarkable. 1.7 cm calcified gallstone in the otherwise  unremarkable gallbladder. No biliary ductal dilatation. Heterogeneous  edematous enlargement of the pancreatic head with peripancreatic fat  stranding.   No pancreatic ductal dilatation or focal pancreatic lesion. No  active extravasation or fluid collection. The spleen and adrenal glands are  unremarkable. There is mild bilateral renal atrophy with multifocal  bilateral renal parenchymal scarring. No suspect renal lesion is evident. There is no hydronephrosis or urinary tract calculus. No acute osseous or soft tissue abnormality. CTA PELVIS:    Mild atherosclerotic plaque. No arterial stenosis or dissection. Edematous wall thickening of the descending and proximal transverse duodenum  is favored reactive secondary to adjacent pancreatic changes. Prior sigmoid  colectomy. Scattered colonic diverticula without evidence of acute  diverticulitis. Normal appendix. Urinary bladder is unremarkable. Prior hysterectomy. No pelvic mass. Small amount of dependent free fluid throughout the peritoneal cavity. No  free or focal fluid collection. No pathologically enlarged lymph node. No acute osseous or soft tissue abnormality. Impression  1. Findings of acute uncomplicated pancreatitis with secondary reactive  edematous wall thickening of the duodenum. 2. Small amount of reactive free fluid throughout the peritoneal cavity. No  evidence of abscess. 3. Mild-to-moderate atherosclerotic disease as described above. No aortic  aneurysm or dissection. Code Status: DNR-CC     ADVANCED CARE PLANNING:  Patient has capacity for medical decisions: no  Health Care Power of :  no copy on file   Living Will: no     Personal, Social, and Family History  Marital Status:   Living situation:with family:  spouse  Importance of maddie/Moravian/spiritual beliefs: [] Very [] Somewhat [] Not   Psychological Distress: moderate  Does patient understand diagnosis/treatment? no  Does caregiver understand diagnosis/treatment? yes      Assessment        REVIEW OF SYSTEMS  Patient drowsy not really answering questions or following commands patient moans when moved.  Unable to assess ROS     PHYSICAL ASSESSMENT:  Constitutional: Patient drowsy not really answering questions or following commands patient moans when moved. Head: Normocephalic and atraumatic. Eyes: eyes closed   Neck: Normal range of motion. Neck supple. No tracheal deviation present. Cardiovascular: tachycardia   Pulmonary/Chest: Lungs diminished respirations relaxed oxygen per NC   Abdomen: abdomen tender to touch decreased intake   Musculoskeletal: Patient with generalized weakness Neurological: Patient drowsy not really answering questions or following commands patient moans when moved. Skin: Normal turgor, no bleeding, no bruising     Palliative Performance Scale:  ___60%  Ambulation reduced; Significant disease; Can't do hobbies/housework; intake normal or reduced; occasional assist; LOC full/confusion  ___50%  Mainly sit/lie; Extensive disease; Can't do any work; Considerable assist; intake normal or reduced; LOC full/confusion  ___40%  Mainly in bed; Extensive disease; Mainly assist; intake normal or reduced; LOC full/confusion   _x__30%  Bed Bound; Extensive disease; Total care; intake reduced; LOCfull/confusion  ___20%  Bed Bound; Extensive disease; Total care; intake minimal; Drowsy/coma  ___10%  Bed Bound; Extensive disease; Total care; Mouth care only; Drowsy/coma  ___0       Death        Thank you for allowing Palliative Care to participate in the care of Ms. Jaime Akers . This note has been dictated by dragon, typing errors may be a possibility. The total time I spent in seeing the patient, discussing goals of care, advanced directives, code status and other major issues was more than 60 minutes      Electronically signed by   RIC Herrera NP  Palliative Care Team  on 8/31/2022 at 2:44 PM    [address] Number [phone]    [address] Number [phone] [address] Number [phone]    Please call with any palliative questions or concerns.

## 2022-08-31 NOTE — CARE COORDINATION
Transitional Planning    Dr. Scott Vides spoke with pt's  and son about discharge plans.  and son agreeable to SNF, requesting referral to [facility]. Spoke with [facility], they can accept.     1600 Updated by palliative care that family is agreeable to meet with hospice.  Trevor Gray NP states she will call in referral.

## 2022-08-31 NOTE — PROGRESS NOTES
Patient's MEWS score is a 5; vitals are as followed: Temp 98.4, , /66, Resp 15, SpO2 92%, asleep/drowsy but responds to voice. Writer notified physician via [address] [place] [address]. Will continue to monitor.

## 2022-08-31 NOTE — PROGRESS NOTES
Patient awoke from sleep tearful and crying. Writer educated patient/family on PO pain meds that were ordered; patient unable to swallow/drink fluids. IV pain medication given per order. Will continue to monitor.

## 2022-08-31 NOTE — PROGRESS NOTES
Physical Therapy        Physical Therapy Cancel Note      DATE: 2022    NAME: Inez Rascon  MRN: [id number]   : 1940      Patient not seen this date for Physical Therapy due to: Other: Hold PT in AM per nursing; pt sleeping and nursing does not want patient awakened.  Will recheck in PM.      Electronically signed by Joanne Castillo PT on 2022 at 11:41 AM

## 2022-08-31 NOTE — PROGRESS NOTES
Occupational [address] [place]  Occupational Therapy Not Seen Note    DATE: 2022    NAME: Sera Medina  MRN: [id number]   : 1940      Patient not seen this date for Occupational Therapy due to:    Pt attempted x2 - Pt was not appropriate for functional OT d/t:  In the morning, Pt was asleep, therapies were asked not to awaken Pt (by both Nursing and Pt's family). In the afternoon, Pt had participated in PT, was severely lethargic and unable to participate with OT.       Electronically signed by GINETTE Vincent OTR/L on 2022 at 3:34 PM

## 2022-08-31 NOTE — PROGRESS NOTES
Physical Therapy  Facility/Department: [facility] SURG ICU  Physical Therapy Daily Treatment Note    Name: Srini Fonseca  : 1940  MRN: [id number]  Date of Service: 2022    Discharge Recommendations:  Patient would benefit from continued therapy after discharge   PT Equipment Recommendations  Equipment Needed: Yes  Mobility Devices: Mardell Chittenden: Rolling  Other: Continue to assess but likely will benefit from pending overall pain control at d/c. Patient Diagnosis(es): The encounter diagnosis was Acute pancreatitis, unspecified complication status, unspecified pancreatitis type. Past Medical History:  has a past medical history of Accelerated hypertension, Atypical chest pain, Hyperlipidemia, Hypertension, and Labile blood pressure. Past Surgical History:  has a past surgical history that includes Hysterectomy; Abdomen surgery; LIGATION OF SAPHENOUS VEIN; and bladder suspension. Assessment   Body Structures, Functions, Activity Limitations Requiring Skilled Therapeutic Intervention: Decreased functional mobility ; Decreased strength;Decreased safe awareness;Decreased cognition;Decreased endurance;Decreased balance;Decreased posture; Increased pain  Assessment: Pt with significantly impaired mobility this date secondary to abdominal pain- poor overall tolerance to mobility. Pt requires max A to pivot to/from commode. Pt would currently be unsafe to return to her prior living arrangements based on today's mobility and will benefit from further therapy at discharge. Therapy Prognosis: Fair  Requires PT Follow-Up: Yes  Activity Tolerance  Activity Tolerance: Patient limited by fatigue;Patient limited by pain;Treatment limited secondary to decreased cognition;Patient limited by endurance  Activity Tolerance Comments: Limited session secondary to pain today and pain medications, etc. Pt with varied increase in participation today with cues.  Decreased tolerance to mobility compared to prior days with writer. Plan   Plan  Plan:  (5-6x)  Current Treatment Recommendations: Strengthening, Balance training, Functional mobility training, Transfer training, Endurance training, Gait training, Stair training, Home exercise program, Safety education & training, Patient/Caregiver education & training, Equipment evaluation, education, & procurement, Therapeutic activities  Safety Devices  Type of Devices: Call light within reach, Gait belt, Left in bed, Nurse notified, Bed alarm in place  Restraints  Restraints Initially in Place: No     Restrictions  Restrictions/Precautions  Restrictions/Precautions: Fall Risk  Required Braces or Orthoses?: No  Position Activity Restriction  Other position/activity restrictions: Up with assistance     Subjective   General  Patient assessed for rehabilitation services?: Yes  Response To Previous Treatment: Patient with no complaints from previous session. Family / Caregiver Present: No  Follows Commands: Impaired (Intermittently follows commands today)  Subjective  Subjective: Pt supine in bed with c/o pain- moaning in pain but unable to verbalize location, etc. Pt assisted to <> from commode for toileting needs. Objective      Bed mobility  Rolling to Left: Maximum assistance  Rolling to Right: Maximum assistance  Supine to Sit: Maximum assistance  Sit to Supine: Maximum assistance;2 Person assistance  Scooting: Maximal assistance  Bed Mobility Comments: Pt originially with limited core engagement but once partially upright was able to assist with remainder of bed mobility. Transfers  Sit to Stand: Maximum Assistance  Stand to sit: Maximum Assistance  Bed to Chair: Maximum assistance  Comment: Face to face sit<>stand and stand pivot to/from commode today with max A. Pt requires cues for hand placement with poor carryover. Pt only able to tolerate 2-3 seconds of standing today.   Ambulation  More Ambulation?: No  Stairs/Curb  Stairs?: No     Balance  Posture: Poor  Sitting - Static: Poor  Sitting - Dynamic: Poor;-  Standing - Static: Poor;-  Standing - Dynamic: Poor;-  Comments: standing balance assessed with face to face transfer. Pt able to sit on edge of commode x5 minutes with originially max A but with verbal cues improved to only min A with noted increase in core engagement. Pt with significant pain complaints this date.            AM-PAC Score  AM-PAC Inpatient Mobility Raw Score : 10 (08/31/22 1444)  AM-PAC Inpatient T-Scale Score : 32.29 (08/31/22 1444)  Mobility Inpatient CMS 0-100% Score: 76.75 (08/31/22 1444)  Mobility Inpatient CMS G-Code Modifier : CL (08/31/22 1444)          Goals  Short Term Goals  Time Frame for Short term goals: 14 visits  Short term goal 1: Pt to ambulate 300ft modified independently with least restrictive device  Short term goal 2: Pt to sit <> stand transfer independently  Short term goal 3: Pt to demonstrate independent bed mobility  Short term goal 4: Pt to demonstrate good - standing balance to decrease risk of falls  Short term goal 5: Pt to ascend/descend flight of stairs with one rail modified independently       Education  Patient Education  Education Given To: Patient  Education Provided: Transfer Training  Education Method: Verbal;Demonstration  Barriers to Learning: Cognition  Education Outcome: Continued education needed      Therapy Time   Individual Concurrent Group Co-treatment   Time In 1355         Time Out 1422         Minutes 27         Timed Code Treatment Minutes: [address] [place],9D, PT

## 2022-08-31 NOTE — PROGRESS NOTES
[facility]  Office: [address], DO, Florencio Anderson, DO, Bryant Gonzalez, DO, Sayra Connell Blood, DO, Flaquita Paz MD, Shayla Titus MD, Chadd Royal MD, Carissa Kiran MD,  Matthew Penn MD, Angy Martinez MD, Timbo Orozco, DO, Asia Euceda MD,  Stephan Connell MD, Juana Mcallister MD, Karlene Velasquez, DO, Mary De Anda MD, Vinay Jones MD, Fernando August MD, Marlena Hicks MD, Chalino Grace MD, Noel Sullivan MD, Magali Wang, DO, Derek Esquivel MD, Kartik Jung MD, Hernesto Jose, CNP,  Kassi Waller, CNP, Jolly Adame, CNP, Meghan Ledezma, CNP, Toby Matute, PA-C, Gabrielle Goldberg, DNP, Deb Archer, CNP, Deshaun Stark, CNP, Greg Hinds, CNP, Ron Blackburn, CNP, Schmitt Cancer, CNP, Edilberto Conner, CNS, Clint Segura, DNP, Malvin Max, CNP, Jose Hyatt, CNP, Mikala Petit, Colin [address]    Progress Note    8/31/2022    10:53 AM    Name:   Marbella Andrews  MRN:     [id number]     Acct:      [de-identified]   Room:   [address] [place] [address] Day:  3  Admit Date:  8/28/2022  3:48 PM    PCP:   Juliet Whitehead MD  Code Status:  DNR-CC    Subjective:     C/C:   Chief Complaint   Patient presents with    Abdominal Pain    Drug Overdose     Patient seen in follow-up for abdominal pain secondary to necrotizing pancreatitis, chief complaint unobtainable due to encephalopathy/dementia    Interval History Status: worsened. Patient is less interactive today. Whereas yesterday she did answer simple questions today she is not particularly interactive and not answering questions appropriately. I did have a long discussion with the patient's family outside of the room per their request.  Very difficult situation as the patient has dementia and multiple comorbid conditions making treatment for her necrotizing pancreatitis difficult.   Family have significant concerns about whether or not she could even survive for surgical intervention and what her postoperative recovery may be like. This is all very reasonable and appropriate concerns. At this point in time she is maintained on meropenem and they do wish to continue with aggressive nonoperative management. We discussed disposition as initially plans were to take the patient home however she requires a significant amount of care and I do not know that the family will be able to provide all the care that she will require. I had a long discussion with the patient's  and son and we discussed consideration of a possible skilled nursing facility not only for IV antibiotics but in addition to rehab. We also discussed involving palliative care for pain control and symptom management. At this point in time they are not ready for hospice and they do wish to continue treatment. They do understand that if the patient does not start eating and if she does not \"rally\" that hospice may be appropriate. They appreciate all the information and at this point in time we will work on disposition to a skilled nursing facility with palliative care to follow. Brief History: This very pleasant 79-year-old female presented to hospital with abdominal pain, nausea, vomiting, anorexia. Imaging studies revealed necrotizing pancreatitis. Given her advanced age and comorbid conditions family have significant concerns regarding surgical intervention. Subsequently they do not want the patient transferred and wanted aggressive nonoperative management. She has been placed on meropenem. At this point in time plans are to work on disposition to a skilled nursing facility for continuation of IV antibiotics, rehabilitation and pain control. Review of Systems:     Limited due to the patient's dementia/encephalopathy    Medications: Allergies:     Allergies   Allergen Reactions    Doxycycline Other (See Comments)     Other reaction(s): diarrhea.stomach pain Azithromycin Other (See Comments)    Ciprofloxacin Other (See Comments)       Current Meds:   Scheduled Meds:    sodium chloride  100 mL IntraVENous Once    meropenem  1,000 mg IntraVENous Q12H    HYDROmorphone  0.5 mg IntraVENous Once    lidocaine 1 % injection  5 mL IntraDERmal Once    sodium chloride flush  5-40 mL IntraVENous 2 times per day    sodium chloride  80 mL IntraVENous Once    sodium chloride flush  5-40 mL IntraVENous 2 times per day    enoxaparin  40 mg SubCUTAneous Daily    hydrALAZINE  25 mg Oral 3 times per day    NIFEdipine  60 mg Oral Daily     Continuous Infusions:    sodium chloride      sodium chloride      sodium chloride 125 mL/hr (22 0150)     PRN Meds: oxyCODONE **OR** oxyCODONE, morphine **OR** [DISCONTINUED] morphine, sodium chloride flush, sodium chloride, sodium chloride flush, sodium chloride flush, sodium chloride, ondansetron **OR** ondansetron, polyethylene glycol, acetaminophen **OR** acetaminophen, potassium chloride **OR** potassium alternative oral replacement **OR** potassium chloride, magnesium sulfate    Data:     Past Medical History:   has a past medical history of Accelerated hypertension, Atypical chest pain, Hyperlipidemia, Hypertension, and Labile blood pressure. Social History:   reports that she has quit smoking. She has never used smokeless tobacco. She reports that she does not currently use alcohol. She reports that she does not use drugs.      Family History:   Family History   Problem Relation Age of Onset    Heart Disease Mother 78    Alzheimer's Disease Father 67    Heart Attack Brother 62    Heart Disease Brother     Heart Disease Brother        Vitals:  /66   Pulse (!) 135   Temp 98.4 °F (36.9 °C) (Axillary)   Resp 15   Ht 5' 1\" (1.549 m)   Wt 133 lb 2.5 oz (60.4 kg)   SpO2 92%   BMI 25.16 kg/m²   Temp (24hrs), Av.3 °F (36.8 °C), Min:97.7 °F (36.5 °C), Max:98.6 °F (37 °C)    No results for input(s): POCGLU in the last 72 hours.    I/O (24Hr): Intake/Output Summary (Last 24 hours) at 8/31/2022 1053  Last data filed at 8/30/2022 1500  Gross per 24 hour   Intake --   Output 50 ml   Net -50 ml       Labs:  Hematology:  Recent Labs     08/29/22  0733 08/30/22  0904 08/31/22  0541   WBC 13.1* 13.3* 14.5*   RBC 4.83 4.85 4.44   HGB 15.0 14.9 13.7   HCT 44.2 42.3 41.4   MCV 91.6 87.2 93.2   MCH 31.1 30.7 30.9   MCHC 34.0 35.2 33.1   RDW 14.9 15.9* 16.1*    163 135*   MPV 9.0 11.3 12.1     Chemistry:  Recent Labs     08/28/22  1632 08/29/22  0131 08/30/22  0232 08/30/22  0904 08/30/22  1530 08/31/22  0541   *   < > 128* 130*  --  130*   K 3.3*  --   --  5.4* 3.8 3.9   CL 84*  --   --  100  --  106   CO2 17*  --   --  16*  --  13*   GLUCOSE 159*  --   --  118*  --  91   BUN 24*  --   --  35*  --  47*   CREATININE 0.93*  --   --  0.86  --  0.89   MG 1.4*  --   --   --   --   --    ANIONGAP 20*  --   --  14  --  11   LABGLOM 58*  --   --  >60  --  >60   GFRAA >60  --   --  >60  --  >60   CALCIUM 10.3  --   --  8.7  --  8.1*   PROBNP 1,774*  --   --   --   --   --    TROPHS 26*  --   --   --   --   --     < > = values in this interval not displayed. Recent Labs     08/28/22 1632 08/30/22  0904 08/31/22  0541   PROT 7.1 5.2* 4.3*   LABALBU 3.4* 2.4* 1.3*   AST 42* 67* 56*   ALT 33 23 16   ALKPHOS 118* 157* 260*   BILITOT 0.38 0.69 1.13   LIPASE 1,349* 461* 145*     ABG:No results found for: POCPH, PHART, PH, POCPCO2, XKJ7JXN, PCO2, POCPO2, PO2ART, PO2, POCHCO3, XOY4XMK, HCO3, NBEA, PBEA, BEART, BE, THGBART, THB, HFE8RLL, ETKP2BUT, O8FNHSJQ, O2SAT, FIO2  Lab Results   Component Value Date/Time    SPECIAL 6ML LEFT HAND 02/02/2021 07:25 PM     Lab Results   Component Value Date/Time    CULTURE NO GROWTH 6 DAYS 02/02/2021 07:25 PM       Radiology:  XR CHEST PORTABLE    Result Date: 8/28/2022  Possible mild pulmonary vascular congestion     MRI ABDOMEN W WO CONTRAST MRCP    Result Date: 8/29/2022  1.   Acute pancreatitis with hypoenhancement of the pancreatic parenchyma suspicious for necrotizing pancreatitis. Extensive peripancreatic inflammation and small volume free fluid within the upper abdomen. No focal fluid collection. 2.  No biliary ductal dilation or choledocholithiasis. 3.  Cholelithiasis without acute cholecystitis. 4.  Hepatic steatosis. Mildly nodular liver contour is suggestive of chronic liver disease. CTA ABDOMEN PELVIS W CONTRAST    Result Date: 8/28/2022  1. Findings of acute uncomplicated pancreatitis with secondary reactive edematous wall thickening of the duodenum. 2. Small amount of reactive free fluid throughout the peritoneal cavity. No evidence of abscess. 3. Mild-to-moderate atherosclerotic disease as described above. No aortic aneurysm or dissection.        Physical Examination:       General appearance: Awakens to voice, markedly confused  Mental Status: Difficult to assess  Lungs:  clear to auscultation bilaterally, normal effort  Heart:  regular rate and rhythm, no murmur  Abdomen:  soft, epigastric tenderness on palpation, nondistended, normal bowel sounds, no masses, hepatomegaly, splenomegaly  Extremities:  no edema, redness, tenderness in the calves  Skin:  no gross lesions, rashes, induration    Assessment:     Hospital Problems             Last Modified POA    * (Principal) Acute pancreatitis without necrosis or infection, unspecified 8/28/2022 Yes    Hyperlipidemia 8/28/2022 Yes    Hyponatremia 8/28/2022 Yes    Dementia ([address] 8/28/2022 Yes       Plan:     Necrotizing pancreatitis  Continue meropenem  Initiate Roxicodone with morphine for breakthrough pain  Advance diet to full liquid  Mild hyponatremia  Secondary to poor oral intake  Dementia  Supportive care    Consult palliative care, possible discharge to skilled nursing facility for continuation of care later this week    Michelle Mora DO  8/31/2022  10:53 AM

## 2022-08-31 NOTE — ACP (ADVANCE CARE PLANNING)
Advance Care Planning     Advance Care Planning (ACP) Physician/NP/PA Conversation    Date of Conversation: 8/31/2022  Conducted with: Patient unable to participate in conversation I had conversation with patient  and children     Healthcare Decision Maker:    Primary Decision Maker: Jani Miner - Spouse - [phone]    Secondary Decision Maker: Gerard darnell - [phone]  Click here to complete Healthcare Decision Makers including selection of the Healthcare Decision Maker Relationship (ie \"Primary\"). Patient has been  to [place] for 61 years and have 3 biological children 2 sons and 1 daughter. [facility] but not [facility] file. By [place] law patient  Arnaud would be primary decision maker. [place] states he is working with his children to make decisions       Care Preferences:    Hospitalization: \"If your health worsens and it becomes clear that your chance of recovery is unlikely, what would be your preference regarding hospitalization? \"  Hospice meeting tomorrow     Ventilation: \"If you were unable to breath on your own and your chance of recovery was unlikely, what would be your preference about the use of a ventilator (breathing machine) if it was available to you? \"  [facility]    Resuscitation: \"In the event your heart stopped as a result of an underlying serious health condition, would you want attempts made to restart your heart, or would you prefer a natural death? \"  [facility]        Conversation Outcomes / Follow-Up Plan:    Palliative Interaction:  I went and spoke with patient  Keturah Thornton, and daughter and introduced myself and my palliative care role. Patient has been  to [place] for 61 years and have 3 biological children 2 sons and 1 daughter. [facility] but not on file. By [employer] patient  Arnaud would be primary decision maker.  [place] states he is working with his children to make decisions     Patient is currently a [facility] comfort care and has Hospice meeting with [facility] hospice set up for 330 pm tomorrow. We discussed difference between skilled care and hospice and I answered patient questions to the best of my ability.      Emotional support provided and Palliative care will continue to follow        Length of Voluntary ACP Conversation in minutes:  16 minutes    RIC Rudolph - NP

## 2022-09-01 LAB
ALBUMIN SERPL-MCNC: 1.3 G/DL (ref 3.5–5.2)
ALBUMIN/GLOBULIN RATIO: 0.5 (ref 1–2.5)
ALP BLD-CCNC: 329 U/L (ref 35–104)
ALT SERPL-CCNC: 13 U/L (ref 5–33)
ANION GAP SERPL CALCULATED.3IONS-SCNC: 11 MMOL/L (ref 9–17)
AST SERPL-CCNC: 41 U/L
BILIRUB SERPL-MCNC: 1.64 MG/DL (ref 0.3–1.2)
BUN BLDV-MCNC: 59 MG/DL (ref 8–23)
CALCIUM SERPL-MCNC: 8 MG/DL (ref 8.6–10.4)
CHLORIDE BLD-SCNC: 113 MMOL/L (ref 98–107)
CO2: 15 MMOL/L (ref 20–31)
CREAT SERPL-MCNC: 1.16 MG/DL (ref 0.5–0.9)
GFR AFRICAN AMERICAN: 54 ML/MIN
GFR NON-AFRICAN AMERICAN: 45 ML/MIN
GFR SERPL CREATININE-BSD FRML MDRD: ABNORMAL ML/MIN/{1.73_M2}
GLUCOSE BLD-MCNC: 92 MG/DL (ref 70–99)
LIPASE: 51 U/L (ref 13–60)
POTASSIUM SERPL-SCNC: 3.8 MMOL/L (ref 3.7–5.3)
SODIUM BLD-SCNC: 139 MMOL/L (ref 135–144)
TOTAL PROTEIN: 3.8 G/DL (ref 6.4–8.3)

## 2022-09-01 PROCEDURE — 99232 SBSQ HOSP IP/OBS MODERATE 35: CPT | Performed by: HOSPITALIST

## 2022-09-01 PROCEDURE — 83690 ASSAY OF LIPASE: CPT

## 2022-09-01 PROCEDURE — 2580000003 HC RX 258: Performed by: STUDENT IN AN ORGANIZED HEALTH CARE EDUCATION/TRAINING PROGRAM

## 2022-09-01 PROCEDURE — 2700000000 HC OXYGEN THERAPY PER DAY

## 2022-09-01 PROCEDURE — 80053 COMPREHEN METABOLIC PANEL: CPT

## 2022-09-01 PROCEDURE — 6360000002 HC RX W HCPCS: Performed by: HOSPITALIST

## 2022-09-01 PROCEDURE — 36415 COLL VENOUS BLD VENIPUNCTURE: CPT

## 2022-09-01 PROCEDURE — 6360000002 HC RX W HCPCS: Performed by: STUDENT IN AN ORGANIZED HEALTH CARE EDUCATION/TRAINING PROGRAM

## 2022-09-01 PROCEDURE — 1210000000 HC MED SURG R&B

## 2022-09-01 RX ORDER — LORAZEPAM 2 MG/ML
1 INJECTION INTRAMUSCULAR ONCE
Status: DISCONTINUED | OUTPATIENT
Start: 2022-09-01 | End: 2022-09-02 | Stop reason: HOSPADM

## 2022-09-01 RX ADMIN — MORPHINE SULFATE 3 MG: 4 INJECTION INTRAVENOUS at 15:48

## 2022-09-01 RX ADMIN — MORPHINE SULFATE 3 MG: 4 INJECTION INTRAVENOUS at 16:48

## 2022-09-01 RX ADMIN — MORPHINE SULFATE 3 MG: 4 INJECTION INTRAVENOUS at 00:29

## 2022-09-01 RX ADMIN — MORPHINE SULFATE 1 MG: 2 INJECTION, SOLUTION INTRAMUSCULAR; INTRAVENOUS at 22:39

## 2022-09-01 RX ADMIN — MORPHINE SULFATE 3 MG: 4 INJECTION INTRAVENOUS at 07:26

## 2022-09-01 RX ADMIN — MORPHINE SULFATE 3 MG: 4 INJECTION INTRAVENOUS at 03:34

## 2022-09-01 RX ADMIN — MEROPENEM 1000 MG: 1 INJECTION, POWDER, FOR SOLUTION INTRAVENOUS at 16:50

## 2022-09-01 RX ADMIN — MORPHINE SULFATE 3 MG: 4 INJECTION INTRAVENOUS at 13:57

## 2022-09-01 RX ADMIN — MORPHINE SULFATE 5 MG: 4 INJECTION INTRAVENOUS at 05:12

## 2022-09-01 RX ADMIN — MEROPENEM 1000 MG: 1 INJECTION, POWDER, FOR SOLUTION INTRAVENOUS at 03:26

## 2022-09-01 RX ADMIN — MORPHINE SULFATE 3 MG: 4 INJECTION INTRAVENOUS at 08:57

## 2022-09-01 RX ADMIN — MORPHINE SULFATE 3 MG: 4 INJECTION INTRAVENOUS at 10:47

## 2022-09-01 ASSESSMENT — PAIN SCALES - GENERAL
PAINLEVEL_OUTOF10: 8
PAINLEVEL_OUTOF10: 10

## 2022-09-01 NOTE — DISCHARGE SUMMARY
pancreatitis. Recommendations were for transfer to [facility] to see a hepatobiliary surgeon. That said given her age, dementia and comorbid conditions family did not wish to pursue surgical interventions. She was treated medically with meropenem and pain control. During the course of her admission multiple conversations were made to discuss goals of care. Palliative care was involved and ultimately family opted to palliate patient. Hospice was consulted and ultimately patient is transferred to hospice for end-of-life care. Significant therapeutic interventions: As above    Significant Diagnostic Studies:   Labs / Micro:  CBC:   Lab Results   Component Value Date/Time    WBC 14.5 08/31/2022 05:41 AM    RBC 4.44 08/31/2022 05:41 AM    HGB 13.7 08/31/2022 05:41 AM    HCT 41.4 08/31/2022 05:41 AM    MCV 93.2 08/31/2022 05:41 AM    MCH 30.9 08/31/2022 05:41 AM    MCHC 33.1 08/31/2022 05:41 AM    RDW 16.1 08/31/2022 05:41 AM     08/31/2022 05:41 AM     BMP:    Lab Results   Component Value Date/Time    GLUCOSE 92 09/01/2022 05:56 AM     09/01/2022 05:56 AM    K 3.8 09/01/2022 05:56 AM     09/01/2022 05:56 AM    CO2 15 09/01/2022 05:56 AM    ANIONGAP 11 09/01/2022 05:56 AM    BUN 59 09/01/2022 05:56 AM    CREATININE 1.16 09/01/2022 05:56 AM    BUNCRER NOT REPORTED 06/21/2021 12:28 PM    CALCIUM 8.0 09/01/2022 05:56 AM    LABGLOM 45 09/01/2022 05:56 AM    GFRAA 54 09/01/2022 05:56 AM    GFR      09/01/2022 05:56 AM        Radiology:  XR CHEST PORTABLE    Result Date: 8/28/2022  Possible mild pulmonary vascular congestion     MRI ABDOMEN W WO CONTRAST MRCP    Result Date: 8/29/2022  1. Acute pancreatitis with hypoenhancement of the pancreatic parenchyma suspicious for necrotizing pancreatitis. Extensive peripancreatic inflammation and small volume free fluid within the upper abdomen. No focal fluid collection. 2.  No biliary ductal dilation or choledocholithiasis.  3.  Cholelithiasis without acute cholecystitis. 4.  Hepatic steatosis. Mildly nodular liver contour is suggestive of chronic liver disease. CTA ABDOMEN PELVIS W CONTRAST    Result Date: 8/28/2022  1. Findings of acute uncomplicated pancreatitis with secondary reactive edematous wall thickening of the duodenum. 2. Small amount of reactive free fluid throughout the peritoneal cavity. No evidence of abscess. 3. Mild-to-moderate atherosclerotic disease as described above. No aortic aneurysm or dissection. Consultations:    Consults:     Final Specialist Recommendations/Findings:   IP CONSULT TO GENERAL SURGERY  IP CONSULT TO PALLIATIVE CARE  IP CONSULT TO HOSPICE      The patient was seen and examined on day of discharge and this discharge summary is in conjunction with any daily progress note from day of discharge. Discharge plan:     Disposition: Hospice    Time Spent on discharge is  20 mins in patient examination, evaluation, counseling as well as medication reconciliation, prescriptions for required medications, discharge plan and follow up. Electronically signed by   Ángel Levy DO  9/1/2022  10:46 AM      Thank you Dr. Olivia Dickerson MD for the opportunity to be involved in this patient's care.

## 2022-09-01 NOTE — PLAN OF CARE
Problem: Pain  Goal: Verbalizes/displays adequate comfort level or baseline comfort level  Outcome: Not Progressing  Pain scale preformed per protocol and pt treated for pain as documented. Education given. Problem: Discharge Planning  Goal: Discharge to home or other facility with appropriate resources  Outcome: Progressing  Flowsheets (Taken 9/1/2022 0800)  Discharge to home or other facility with appropriate resources: Identify barriers to discharge with patient and caregiver   Patients questions and concerns addressed and answered. Problem: Safety - Adult  Goal: Free from fall injury  Outcome: Progressing  Flowsheets (Taken 9/1/2022 1238)  Free From Fall Injury: Instruct family/caregiver on patient safety  Fall assessment preformed. Bed in low locked position with call light and tray table within reach. Education given. Will continue to monitor. Problem: ABCDS Injury Assessment  Goal: Absence of physical injury  Outcome: Progressing  Flowsheets (Taken 9/1/2022 1238)  Absence of Physical Injury: Implement safety measures based on patient assessment  Fall assessment preformed. Bed in low locked position with call light and tray table within reach. Education given. Will continue to monitor. Problem: Skin/Tissue Integrity  Goal: Absence of new skin breakdown  Description: 1. Monitor for areas of redness and/or skin breakdown  2. Assess vascular access sites hourly  3. Every 4-6 hours minimum:  Change oxygen saturation probe site  4. Every 4-6 hours:  If on nasal continuous positive airway pressure, respiratory therapy assess nares and determine need for appliance change or resting period. Outcome: Progressing   Skin assessment preformed. Pt turned every 2 hours with heals elevated off bed. Will continue to monitor.

## 2022-09-01 NOTE — PROGRESS NOTES
[facility]  Office: [address], DO, Gwen Nyhan, DO, Janet Collins, DO, Clyde Dipti Blood, DO, Rikki Jaquez MD, Sarah Liu MD, Thine Scherer MD, Zack Jhaveri MD,  Massiel Rodrigues MD, Christopher Sanchez MD, Micheline Waters, DO, Estrella Travis MD,  Rafal Riddle MD, Nimco Humphries MD, Chun Waters, DO, Desean Morris MD, Sebastián Jackson MD, Vassie Holstein, MD, Cyn Ag MD, Marcus Rabago MD, Nelson Tidwell MD, Coleman Ward, DO, Yaritza Maria MD, Brenden Lakhani MD, Giancarlo Mitchell, CNP,  Latrell Yi, CNP, Chandra Morales, CNP, Barbara Eller, CNP, Viridiana Jonas PA-C, Yusef Springer, DNP, Constantino Nugent, CNP, Alaina Shaver, CNP, Osmar Zaman, CNP, Courtney Ansari, CNP, Yosi Drake, CNP, Leah Curiel, CNS, Adam Diaz, [facility], Ronan Mccord, CNP, Gerda Holt, CNP, Colin Walton 1792    Progress Note    9/1/2022    10:43 AM    Name:   Lisa Cueto  MRN:     [id number]     Acct:      [de-identified]   Room:   [address] [place] [address] Day:  4  Admit Date:  8/28/2022  3:48 PM    PCP:   Tarik Elizabeth MD  Code Status:  DNR-CC    Subjective:     C/C:   Chief Complaint   Patient presents with    Abdominal Pain    Drug Overdose     Patient seen in follow-up for abdominal pain secondary to necrotizing pancreatitis, chief complaint unobtainable as patient is more obtunded today    Interval History Status: significantly worsened. Patient continues to do poorly. Family at the bedside. Plans are to meet with hospice today. As mentioned yesterday family wish to keep patient comfortable per her wishes. They do feel that she would not wish to continue with aggressive therapy and subsequently care has been de-escalated and comfort care measures have been initiated. She is presently resting comfortably with morphine.   I did have a long discussion with the family regarding anticipated course over the next couple of days. They appreciate the information. They have a hospice meeting today at 3:30pm.  Providing the patient is hemodynamically stable I have no objection to transferring to the inpatient hospice unit. Brief History: This very pleasant but unfortunate 50-year-old female presented to hospital with abdominal pain, anorexia, nausea. She has been found to have necrotizing pancreatitis. Given her age, comorbid conditions as well as her dementia decision has been made to palliate the patient. She is presently receiving antibiotics for her necrotizing fasciitis along with morphine for pain control. Family are meeting with hospice today (September 1, 2022). Review of Systems:     Unobtainable as patient is encephalopathic/obtunded    Medications: Allergies:     Allergies   Allergen Reactions    Doxycycline Other (See Comments)     Other reaction(s): diarrhea.stomach pain      Azithromycin Other (See Comments)    Ciprofloxacin Other (See Comments)       Current Meds:   Scheduled Meds:    LORazepam  1 mg IntraVENous Once    sodium chloride  100 mL IntraVENous Once    meropenem  1,000 mg IntraVENous Q12H    sodium chloride flush  5-40 mL IntraVENous 2 times per day    sodium chloride  80 mL IntraVENous Once    sodium chloride flush  5-40 mL IntraVENous 2 times per day     Continuous Infusions:    sodium chloride      sodium chloride      sodium chloride 125 mL/hr at 08/31/22 1449     PRN Meds: morphine **OR** morphine **OR** morphine, sodium chloride flush, sodium chloride, sodium chloride flush, sodium chloride flush, sodium chloride, ondansetron **OR** ondansetron, polyethylene glycol, acetaminophen **OR** acetaminophen, potassium chloride **OR** potassium alternative oral replacement **OR** potassium chloride, magnesium sulfate    Data:     Past Medical History:   has a past medical history of Accelerated hypertension, Atypical chest pain, Hyperlipidemia, Hypertension, and Labile blood pressure. Social History:   reports that she has quit smoking. She has never used smokeless tobacco. She reports that she does not currently use alcohol. She reports that she does not use drugs. Family History:   Family History   Problem Relation Age of Onset    Heart Disease Mother 78    Alzheimer's Disease Father 67    Heart Attack Brother 62    Heart Disease Brother     Heart Disease Brother        Vitals:  BP (!) 108/52   Pulse (!) 136   Temp 99 °F (37.2 °C) (Temporal)   Resp 17   Ht 5' 1\" (1.549 m)   Wt 131 lb 13.4 oz (59.8 kg)   SpO2 90%   BMI 24.91 kg/m²   Temp (24hrs), Av.4 °F (36.9 °C), Min:98.1 °F (36.7 °C), Max:99 °F (37.2 °C)    No results for input(s): POCGLU in the last 72 hours. I/O (24Hr):   No intake or output data in the 24 hours ending 22 1043    Labs:  Hematology:  Recent Labs     22  0922  0541   WBC 13.3* 14.5*   RBC 4.85 4.44   HGB 14.9 13.7   HCT 42.3 41.4   MCV 87.2 93.2   MCH 30.7 30.9   MCHC 35.2 33.1   RDW 15.9* 16.1*    135*   MPV 11.3 12.1     Chemistry:  Recent Labs     22  0922  1530 22  0541 22  0556   *  --  130* 139   K 5.4* 3.8 3.9 3.8     --  106 113*   CO2 16*  --  13* 15*   GLUCOSE 118*  --  91 92   BUN 35*  --  47* 59*   CREATININE 0.86  --  0.89 1.16*   ANIONGAP 14  --  11 11   LABGLOM >60  --  >60 45*   GFRAA >60  --  >60 54*   CALCIUM 8.7  --  8.1* 8.0*     Recent Labs     22  0904 22  0541 22  0556   PROT 5.2* 4.3* 3.8*   LABALBU 2.4* 1.3* 1.3*   AST 67* 56* 41*   ALT 23 16 13   ALKPHOS 157* 260* 329*   BILITOT 0.69 1.13 1.64*   LIPASE 461* 145* 51     ABG:No results found for: POCPH, PHART, PH, POCPCO2, VES7QLR, PCO2, POCPO2, PO2ART, PO2, POCHCO3, ZXY6NUE, HCO3, NBEA, PBEA, BEART, BE, THGBART, THB, VPN3UET, UPUW4OKR, X7WOPOOF, O2SAT, FIO2  Lab Results   Component Value Date/Time    SPECIAL 6ML LEFT HAND 2021 07:25 PM     Lab Results   Component Value Date/Time    CULTURE NO GROWTH 6 DAYS 02/02/2021 07:25 PM       Radiology:  XR CHEST PORTABLE    Result Date: 8/28/2022  Possible mild pulmonary vascular congestion     MRI ABDOMEN W WO CONTRAST MRCP    Result Date: 8/29/2022  1. Acute pancreatitis with hypoenhancement of the pancreatic parenchyma suspicious for necrotizing pancreatitis. Extensive peripancreatic inflammation and small volume free fluid within the upper abdomen. No focal fluid collection. 2.  No biliary ductal dilation or choledocholithiasis. 3.  Cholelithiasis without acute cholecystitis. 4.  Hepatic steatosis. Mildly nodular liver contour is suggestive of chronic liver disease. CTA ABDOMEN PELVIS W CONTRAST    Result Date: 8/28/2022  1. Findings of acute uncomplicated pancreatitis with secondary reactive edematous wall thickening of the duodenum. 2. Small amount of reactive free fluid throughout the peritoneal cavity. No evidence of abscess. 3. Mild-to-moderate atherosclerotic disease as described above. No aortic aneurysm or dissection. Physical Examination:     Physical exam deferred as the patient is resting comfortably. Assessment:     Hospital Problems             Last Modified POA    * (Principal) Acute pancreatitis 8/31/2022 Yes    ACP (advance care planning) 8/31/2022 Yes    Goals of care, counseling/discussion 8/31/2022 Yes    Encounter for palliative care 8/31/2022 Yes    Hyperlipidemia 8/28/2022 Yes    Hyponatremia 8/28/2022 Yes    Dementia ([facility] [address] 8/28/2022 Yes       Plan:     Necrotizing pancreatitis  Okay to continue fluids for now, long discussion with the patient's family regarding fluids. I do recommend discontinuing fluids when family ready as it will only prolong her disease process.   Okay to continue meropenem for now as it may offer some anti-inflammatory properties leading to decreased pain  Continue morphine for pain control    Hospice meeting today, no objection to transfer to inpatient hospice if patient hemodynamically stable this evening    Joy Hall DO  9/1/2022  10:43 AM

## 2022-09-01 NOTE — CARE COORDINATION
Transitional Planning    Per palliative care note, meeting with [facility] today at [phone] with [facility],  with [facility]. She states that there are no beds at the [place] location, family does not want [place] location. [facility] states there are no nurses available for a bed conversion. [facility] states that she will be back at 0830 tomorrow to reassess patient and bed status.

## 2022-09-01 NOTE — PROGRESS NOTES
Physical Therapy        Physical Therapy Cancel Note      DATE: 2022    NAME: Maddy Hogan  MRN: [id number]   : 1940      Patient not seen this date for Physical Therapy due to:    Patient is not appropriate for PT evaluation/treatment at this time d/t Hospice consult 3:30 PM today.       Electronically signed by Mustapha Bacon PT on 2022 at 8:26 AM

## 2022-09-01 NOTE — FLOWSHEET NOTE
[address] NOTE    Shift date: 9/1/22  Shift day: Thursday   Shift # 1    Room # 303/270-41   Name: Rebecca Villagomez                  Referral:  Hospice    Admit Date & Time: 8/28/2022  3:48 PM    Assessment:  Rebecca Villagomez is a 79 y.o. female in the hospital. Upon entering the room writer observes Patient is lying in bed, and her family (two children and spouse) are at bedside. Writer had met Daughter, Pratik Greenwood, briefly earlier today. Daughter was tearful and shared that hospice will be coming to evaluate Pt today. Daughter shared that she had support from her Dad and brother at the moment. When writer entered Pt's room later, soft music was playing. Family was quiet. Son shared that Pt was sleeping. Pt seemed to open her eyes. Son asked Pt's Spouse to ask if Pt \"wants to speak with the . \" Pt appeared to close her eyes. Son told writer that Pt is \"in and out. \" Son and Daughter told [employer] they did not have needs at this time for writer's support. Intervention:  Writer introduced self and title as   . Writer asked how Pt was doing. Writer provided supportive, silent presence. Writer inquired how Family were coping. Writer assured Family of her prayers for Pt and Family. Outcome:  Patient did not interact during the visit. Family communicated that Pt is sleeping. They thanked writer for stopping but did not have needs at this time. Plan:  Chaplains will remain available to offer spiritual and emotional support as needed. 09/01/22 1345   Encounter Summary   Service Provided For: Family   Referral/Consult From: Hospice;Multi-disciplinary team   Support System Family members   Last Encounter  09/01/22   Complexity of Encounter Moderate   Begin Time 1335   End Time  1345   Total Time Calculated 10 min   Spiritual/Emotional needs   Type Emotional Distress   Grief, Loss, and Adjustments   Type Anticipatory Grief;Adjustment to illness; Hospice Assessment/Intervention/Outcome   Assessment Tearful;Unable to assess   Intervention Sustaining Presence/Ministry of presence;Prayer (assurance of)/[place]   Outcome Expressed Gratitude; Refused/Declined   Plan and Referrals   Plan/Referrals No future visits requested     Electronically signed by Pete Gonzalez on 9/1/2022 at 1:47 PM.  [facility]  [phone]

## 2022-09-01 NOTE — DISCHARGE INSTR - COC
Continuity of Care Form    Patient Name: Danielle Guevara   :  1940  MRN:  [id number]    Admit date:  2022  Discharge date:  ***    Code Status Order: [facility]   Advance Directives:     Admitting Physician:  No admitting provider for patient encounter.   PCP: Arleth Karimi MD    Discharging Nurse: [facility] Unit/Room#: 868/516-34  Discharging Unit Phone Number: ***    Emergency Contact:   Extended Emergency Contact Information  Primary Emergency Contact: Ramiro Momin  Address: [address] [place] [address], [facility] [address] [place] [address] Phone: [phone]  Mobile Phone: [phone]  Relation: Spouse  Secondary Emergency Contact: Gerard darnell  Mobile Phone: [phone]  Relation: Child    Past Surgical History:  Past Surgical History:   Procedure Laterality Date    ABDOMEN SURGERY      colon resection    BLADDER SUSPENSION      HYSTERECTOMY (CERVIX STATUS UNKNOWN)      LIGATION OF SAPHENOUS VEIN         Immunization History:   Immunization History   Administered Date(s) Administered    COVID-19, MODERNA BLUE border, Primary or Immunocompromised, (age 12y+), IM, 100 mcg/0.5mL 2021, 2021, 2021       Active Problems:  Patient Active Problem List   Diagnosis Code    Hiatal hernia K44.9    Hyperlipidemia E78.5    Diverticulitis of colon K57.32    Anxiety F41.9    Insomnia G47.00    Lumbar radiculopathy M54.16    Major depression, single episode F32.9    Memory problem R41.3    Osteoporosis M81.0    Primary osteoarthritis M19.91    Rosacea L71.9    Venous thromboembolism (VTE) I82.90    Chronic 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 [address] A41.9    Gram-negative bacteremia R78.81    Sepsis due to Escherichia coli with acute renal failure and tubular necrosis without septic shock (HCC) A41.51, R65.20, N17.0    Acute pancreatitis K85.90    ACP (advance care planning) Z71.89    Goals of care, counseling/discussion Z71.89    Encounter for palliative care Z51.5       Isolation/Infection:   Isolation            Contact          Patient Infection Status       Infection Onset Added Last Indicated Last Indicated By Review Planned Expiration Resolved Resolved By    ESBL (Extended Spectrum Beta Lactamase) 01/28/21 01/29/21 01/28/21 Culture, Urine        Resolved    COVID-19 (Rule Out) 02/02/21 02/02/21 02/02/21 Respiratory Panel, Molecular, with COVID-19 (Restricted: peds pts or suitable admitted adults) (Ordered)   02/03/21 Rule-Out Test Resulted    COVID-19 (Rule Out) 01/29/21 01/29/21 01/29/21 Respiratory Panel, Molecular, with COVID-19 (Restricted: peds pts or suitable admitted adults) (Ordered)   01/29/21 Rule-Out Test Resulted    COVID-19 (Rule Out) 01/28/21 01/28/21 01/28/21 COVID-19 (Ordered)   01/28/21 Rule-Out Test Resulted            Nurse Assessment:  Last Vital Signs: BP (!) 108/52   Pulse (!) 136   Temp 99 °F (37.2 °C) (Temporal)   Resp 17   Ht 5' 1\" (1.549 m)   Wt 131 lb 13.4 oz (59.8 kg)   SpO2 90%   BMI 24.91 kg/m²     Last documented pain score (0-10 scale): Pain Level: 5  Last Weight:   Wt Readings from Last 1 Encounters:   09/01/22 131 lb 13.4 oz (59.8 kg)     Mental Status:  {IP PT MENTAL STATUS:94143}    IV Access:  {[facility] IV ACCESS:230095088}    Nursing Mobility/ADLs:  Walking   {[facility] DME APAM:461142720}  Transfer  {[facility] DME NRAJ:174496729}  Bathing  {[facility] DME WXGJ:089423407}  Dressing  {[facility] DME XRUK:914305897}  Toileting  {[facility] CLAUDE RVCC:625550087}  Feeding  {[facility] CLAUDE ZGIA:691789638}  Med Admin  {[facility] KNTK:350186497}  Med Delivery   {[facility] MED Delivery:685697043}    Wound Care Documentation and Therapy:  Wound 22 Hand Left (Active)   Wound Etiology Skin Tear 22   Dressing/Treatment Dry dressing 22   Drainage Amount None 22   Drainage Description Serous; Yellow 22 1600   Odor None 22   Number of days: 3        Elimination:  Continence: Bowel: {YES / RT:16780}  Bladder: {YES / XS:74365}  Urinary Catheter: {Urinary Catheter:836590582}   Colostomy/Ileostomy/Ileal Conduit: {YES / ST:26478}       Date of Last BM: ***  No intake or output data in the 24 hours ending 22 0810  No intake/output data recorded.     Safety Concerns:     [address] [employer] Safety Concerns:093459643}    Impairments/Disabilities:      [employer] Impairments/Disabilities:014766239}    Nutrition Therapy:  Current Nutrition Therapy:   [employer] Diet List:633591464}    Routes of Feeding: {[facility] DME Other Feedings:720428502}  Liquids: {Slp liquid thickness:74116}  Daily Fluid Restriction: {CHP DME Yes amt example:272134644}  Last Modified Barium Swallow with Video (Video Swallowing Test): {Done Not Done OJDU:074076791}    Treatments at the Time of Hospital Discharge:   Respiratory Treatments: ***  Oxygen Therapy:  {Therapy; copd oxygen:49983}  Ventilator:    {[facility] Vent IOYN:442786002}    Rehab Therapies: {THERAPEUTIC INTERVENTION:5141297358}  Weight Bearing Status/Restrictions: [address] [employer]  Weight Bearin}  Other Medical Equipment (for information only, NOT a DME order):  {EQUIPMENT:401488285}  Other Treatments: ***    Patient's personal belongings (please select all that are sent with patient):  {[facility] DME Belongings:772231341}    RN SIGNATURE:  {Esignature:697189648}    CASE MANAGEMENT/SOCIAL WORK SECTION    Inpatient Status Date: ***    Readmission Risk Assessment Score:  Readmission Risk              Risk of Unplanned Readmission:  18           Discharging to Facility/ Agency   Name:   Address:  Phone:  Fax:    Dialysis Facility (if applicable)   Name:  Address:  Dialysis Schedule:  Phone:  Fax:    / signature: {Esignature:809937327}    PHYSICIAN SECTION    Prognosis: {Prognosis:7814513407}    Condition at Discharge: Vesna Preston Patient Condition:547596456}    Rehab Potential (if transferring to Rehab): {Prognosis:6321155521}    Recommended Labs or Other Treatments After Discharge: ***    Physician Certification: I certify the above information and transfer of Lui Griggs  is necessary for the continuing treatment of the diagnosis listed and that she requires {Admit to Appropriate Level of Care:53722} for {GREATER/LESS:180538570} 30 days.      Update Admission H&P: {CHP DME Changes in GR}    PHYSICIAN SIGNATURE:  {Esignature:593024525}

## 2022-09-01 NOTE — PLAN OF CARE
Problem: Discharge Planning  Goal: Discharge to home or other facility with appropriate resources  9/1/2022 0202 by Mariangel Branch RN  Outcome: Progressing     Problem: Pain  Goal: Verbalizes/displays adequate comfort level or baseline comfort level  9/1/2022 0202 by Mariangel Branch RN  Outcome: Progressing     Problem: Safety - Adult  Goal: Free from fall injury  9/1/2022 0202 by Mariangel Branch RN  Outcome: Progressing     Problem: ABCDS Injury Assessment  Goal: Absence of physical injury  9/1/2022 0202 by Mariangel Branch RN  Outcome: Progressing     Problem: Skin/Tissue Integrity  Goal: Absence of new skin breakdown  Description: 1. Monitor for areas of redness and/or skin breakdown  2. Assess vascular access sites hourly  3. Every 4-6 hours minimum:  Change oxygen saturation probe site  4. Every 4-6 hours:  If on nasal continuous positive airway pressure, respiratory therapy assess nares and determine need for appliance change or resting period.   9/1/2022 0202 by Mariangel Branch RN  Outcome: Progressing

## 2022-09-02 VITALS
DIASTOLIC BLOOD PRESSURE: 79 MMHG | SYSTOLIC BLOOD PRESSURE: 150 MMHG | HEART RATE: 130 BPM | HEIGHT: 61 IN | TEMPERATURE: 98.2 F | BODY MASS INDEX: 25.1 KG/M2 | OXYGEN SATURATION: 91 % | WEIGHT: 132.94 LBS | RESPIRATION RATE: 16 BRPM

## 2022-09-02 PROCEDURE — 6360000002 HC RX W HCPCS: Performed by: HOSPITALIST

## 2022-09-02 PROCEDURE — 2580000003 HC RX 258: Performed by: STUDENT IN AN ORGANIZED HEALTH CARE EDUCATION/TRAINING PROGRAM

## 2022-09-02 PROCEDURE — 6360000002 HC RX W HCPCS: Performed by: STUDENT IN AN ORGANIZED HEALTH CARE EDUCATION/TRAINING PROGRAM

## 2022-09-02 RX ORDER — LORAZEPAM 2 MG/ML
3 INJECTION INTRAMUSCULAR
Status: DISCONTINUED | OUTPATIENT
Start: 2022-09-02 | End: 2022-09-02 | Stop reason: HOSPADM

## 2022-09-02 RX ORDER — LORAZEPAM 2 MG/ML
1 INJECTION INTRAMUSCULAR
Status: DISCONTINUED | OUTPATIENT
Start: 2022-09-02 | End: 2022-09-02 | Stop reason: HOSPADM

## 2022-09-02 RX ORDER — LORAZEPAM 2 MG/ML
2 INJECTION INTRAMUSCULAR
Status: DISCONTINUED | OUTPATIENT
Start: 2022-09-02 | End: 2022-09-02 | Stop reason: HOSPADM

## 2022-09-02 RX ADMIN — LORAZEPAM 2 MG: 2 INJECTION INTRAMUSCULAR; INTRAVENOUS at 11:57

## 2022-09-02 RX ADMIN — SODIUM CHLORIDE, PRESERVATIVE FREE 10 ML: 5 INJECTION INTRAVENOUS at 08:41

## 2022-09-02 RX ADMIN — MORPHINE SULFATE 3 MG: 4 INJECTION INTRAVENOUS at 08:27

## 2022-09-02 RX ADMIN — LORAZEPAM 2 MG: 2 INJECTION INTRAMUSCULAR; INTRAVENOUS at 08:41

## 2022-09-02 RX ADMIN — MEROPENEM 1000 MG: 1 INJECTION, POWDER, FOR SOLUTION INTRAVENOUS at 04:49

## 2022-09-02 RX ADMIN — MORPHINE SULFATE 3 MG: 4 INJECTION INTRAVENOUS at 04:45

## 2022-09-02 RX ADMIN — MORPHINE SULFATE 3 MG: 4 INJECTION INTRAVENOUS at 11:57

## 2022-09-02 RX ADMIN — ONDANSETRON 4 MG: 2 INJECTION INTRAMUSCULAR; INTRAVENOUS at 08:28

## 2022-09-02 ASSESSMENT — PAIN DESCRIPTION - FREQUENCY: FREQUENCY: CONTINUOUS

## 2022-09-02 ASSESSMENT — PAIN - FUNCTIONAL ASSESSMENT: PAIN_FUNCTIONAL_ASSESSMENT: INTOLERABLE, UNABLE TO DO ANY ACTIVE OR PASSIVE ACTIVITIES

## 2022-09-02 ASSESSMENT — PAIN DESCRIPTION - ONSET: ONSET: ON-GOING

## 2022-09-02 ASSESSMENT — PAIN DESCRIPTION - PAIN TYPE: TYPE: ACUTE PAIN

## 2022-09-02 ASSESSMENT — PAIN DESCRIPTION - LOCATION: LOCATION: ABDOMEN

## 2022-09-02 ASSESSMENT — PAIN SCALES - GENERAL
PAINLEVEL_OUTOF10: 10
PAINLEVEL_OUTOF10: 0
PAINLEVEL_OUTOF10: 0
PAINLEVEL_OUTOF10: 7
PAINLEVEL_OUTOF10: 10

## 2022-09-02 ASSESSMENT — PAIN DESCRIPTION - DESCRIPTORS: DESCRIPTORS: ACHING

## 2022-09-02 ASSESSMENT — PAIN DESCRIPTION - ORIENTATION: ORIENTATION: MID

## 2022-09-02 NOTE — CARE COORDINATION
Transitional Planning    Spoke with Renay Jeter of [facility] . [place] location has a bed, awaiting transport ETA.     0928 per YARELY Llanos ETA 1230pm. Requesting medication prior to transport. States that [facility] may allow pt's spouse to transport with patient.

## 2022-09-02 NOTE — PLAN OF CARE
Problem: Pain  Goal: Verbalizes/displays adequate comfort level or baseline comfort level  Outcome: Completed   Pain scale preformed per protocol and pt treated for pain as documented. Education given. Problem: Safety - Adult  Goal: Free from fall injury  Outcome: Completed   Fall assessment preformed. Bed in low locked position with call light and tray table within reach. Education given. Will continue to monitor. Problem: Skin/Tissue Integrity  Goal: Absence of new skin breakdown  Outcome: Completed   Skin assessment preformed. Pt turned every 2 hours with heals elevated off bed. Waffle mattress in place. Will continue to monitor.

## 2022-09-13 NOTE — PROGRESS NOTES
Physician Progress Note      PATIENT:               Tyson Quigley  CSN #:                  [id number]  :                       1940  ADMIT DATE:       2022 3:48 PM  [address] [place] DATE:        2022 12:47 PM  RESPONDING  PROVIDER #: [place] DO          QUERY TEXT:    Patient was admitted with acute pancreatitis/necrotizing pancreatitis. Transfer to see a hepatobiliary surgeon was recommended, however, family   declined and patient was noted to be treated medically with meropenem and pain   control. Based on clinical indicators and treatment, can the suspected type   of necrotizing pancreatitis be further specified as: The medical record reflects the following:  Risk Factors: Age 79, former smoker  Clinical Indicators: WBCs of 14.5 and LA of 5.5, Alkaline Phos 329, AST 67,   Lipase 1349, Na 123, Bilirubin 1.64, K 5.4,CTA;1. Findings of acute   uncomplicated pancreatitis with secondary reactive edematous wall thickening   of the duodenum. 2.Small amount of reactive free fluid throughout the   peritoneal cavity. No evidence of abscess. MRI; Acute pancreatitis with   hypoenhancement of the pancreatic parenchyma suspicious for necrotizing   pancreatitis. Extensive peripancreatic inflammation and small volume free   fluid within the upper abdomen. No focal fluid collection. Treatment: IV meropenem. , pain control, 0.9 % sodium chloride bolus times 3,   0.9 % sodium chloride infusion 125ml/hr, MRI, CTA, monitoring    Thank you, Please call if questions  Jennifer Quick RN [facility]  [phone]  Options provided:  -- Infective necrotizing pancreatitis. -- Non-infective necrotizing pancreatitis. -- Other - I will add my own diagnosis  -- Disagree - Not applicable / Not valid  -- Disagree - Clinically unable to determine / Unknown  -- Refer to Clinical Documentation Reviewer    PROVIDER RESPONSE TEXT:    Provider is clinically unable to determine a response to this query.     Query created by: Renetta Guerrero on 9/13/2022 8:02 AM      Electronically signed by:  Lori Rossi DO 9/13/2022 12:04 PM

## 2023-02-20 NOTE — CARE COORDINATION
Spoke with patient, states she is asymptomatic.  Advised to use at home test in the next 48 hours if becomes symptomatic.   agrees to contact the PCP office for questions related to their healthcare. Patients top risk factors for readmission: functional cognitive ability, functional physical ability and medical condition  Interventions to address risk factors: Assessment and support for treatment adherence and medication management-reviewed    Discussed follow-up appointments. If no appointment was previously scheduled, appointment scheduling offered: No Is follow up appointment scheduled within 7 days of discharge? Yes  [facility] follow up appointment(s): 2/5/21 PCP    Plan for follow-up call in 7-10 days based on severity of symptoms and risk factors. Plan for next call: symptom management-UTI  CTN provided contact information for future needs. Care Transitions Subsequent and Final Call    Subsequent and Final Calls  Do you have any ongoing symptoms?: No  Have your medications changed?: No  Do you have any questions related to your medications?: No  Do you currently have any active services?: Yes  Are you currently active with any services?: Home Health  Do you have any needs or concerns that I can assist you with?: No  Care Transitions Interventions  Other Interventions: Follow Up  No future appointments.     Ok Shelton RN

## 2023-07-17 NOTE — PROGRESS NOTES
Pt discharged via [facility] with all belongings, scripts and discharge paperwork. Family at bedside. Attending Only

## 2024-05-30 NOTE — PROGRESS NOTES
MRCP has resulted and is showing evidence of necrotizing pancreatitis. Discussed case with general surgery (Dr. Anne Vo) who informed me that the patient will need to be transferred to [facility] or [facility] for evaluation by a hepatobiliary specialist for possible surgical intervention. Discussed goals of care with the patient's  Susy Dar) at bedside this afternoon. Bronwyn Ferraro is not interested in surgical intervention at this time. Plan to start meropenem and monitor for improvement. Palliative care and/or Hospice services will be appropriate if the patient fails to improve/clinically deteriorates.        Milagros Johnson MD   Hospitalist .